# Patient Record
Sex: FEMALE | Race: WHITE | NOT HISPANIC OR LATINO | Employment: OTHER | URBAN - METROPOLITAN AREA
[De-identification: names, ages, dates, MRNs, and addresses within clinical notes are randomized per-mention and may not be internally consistent; named-entity substitution may affect disease eponyms.]

---

## 2017-01-10 ENCOUNTER — HISTORICAL (OUTPATIENT)
Dept: RADIOLOGY | Facility: HOSPITAL | Age: 82
End: 2017-01-10

## 2017-03-20 ENCOUNTER — HISTORICAL (OUTPATIENT)
Dept: ADMINISTRATIVE | Facility: HOSPITAL | Age: 82
End: 2017-03-20

## 2017-08-20 LAB — RAPID GROUP A STREP (OHS): NEGATIVE

## 2018-04-18 ENCOUNTER — HISTORICAL (OUTPATIENT)
Dept: ADMINISTRATIVE | Facility: HOSPITAL | Age: 83
End: 2018-04-18

## 2018-04-18 LAB
ALBUMIN SERPL-MCNC: 3.6 GM/DL (ref 3.4–5)
ALBUMIN/GLOB SERPL: 1.3 {RATIO}
ALP SERPL-CCNC: 69 UNIT/L (ref 38–126)
ALT SERPL-CCNC: 18 UNIT/L (ref 12–78)
AST SERPL-CCNC: 13 UNIT/L (ref 15–37)
BILIRUB SERPL-MCNC: 0.4 MG/DL (ref 0.2–1)
BILIRUBIN DIRECT+TOT PNL SERPL-MCNC: 0.1 MG/DL (ref 0–0.2)
BILIRUBIN DIRECT+TOT PNL SERPL-MCNC: 0.3 MG/DL (ref 0–0.8)
BUN SERPL-MCNC: 28 MG/DL (ref 7–18)
CALCIUM SERPL-MCNC: 9.4 MG/DL (ref 8.5–10.1)
CHLORIDE SERPL-SCNC: 105 MMOL/L (ref 98–107)
CHOLEST SERPL-MCNC: 197 MG/DL (ref 0–200)
CHOLEST/HDLC SERPL: 3.3 {RATIO} (ref 0–4)
CO2 SERPL-SCNC: 31 MMOL/L (ref 21–32)
CREAT SERPL-MCNC: 0.94 MG/DL (ref 0.55–1.02)
ERYTHROCYTE [DISTWIDTH] IN BLOOD BY AUTOMATED COUNT: 11.9 % (ref 11.5–17)
GLOBULIN SER-MCNC: 2.7 GM/DL (ref 2.4–3.5)
GLUCOSE SERPL-MCNC: 86 MG/DL (ref 74–106)
HCT VFR BLD AUTO: 40.6 % (ref 37–47)
HDLC SERPL-MCNC: 60 MG/DL (ref 35–60)
HGB BLD-MCNC: 13.6 GM/DL (ref 12–16)
LDLC SERPL CALC-MCNC: 118 MG/DL (ref 0–129)
MCH RBC QN AUTO: 32.1 PG (ref 27–31)
MCHC RBC AUTO-ENTMCNC: 33.5 GM/DL (ref 33–36)
MCV RBC AUTO: 95.8 FL (ref 80–94)
PLATELET # BLD AUTO: 182 X10(3)/MCL (ref 130–400)
PMV BLD AUTO: 10.5 FL (ref 9.4–12.4)
POTASSIUM SERPL-SCNC: 3.6 MMOL/L (ref 3.5–5.1)
PROT SERPL-MCNC: 6.3 GM/DL (ref 6.4–8.2)
RBC # BLD AUTO: 4.24 X10(6)/MCL (ref 4.2–5.4)
SODIUM SERPL-SCNC: 141 MMOL/L (ref 136–145)
TRIGL SERPL-MCNC: 96 MG/DL (ref 30–150)
TSH SERPL-ACNC: 3.01 MIU/ML (ref 0.36–3.74)
VLDLC SERPL CALC-MCNC: 19 MG/DL
WBC # SPEC AUTO: 3.5 X10(3)/MCL (ref 4.5–11.5)

## 2018-08-29 ENCOUNTER — HISTORICAL (OUTPATIENT)
Dept: ADMINISTRATIVE | Facility: HOSPITAL | Age: 83
End: 2018-08-29

## 2019-02-07 ENCOUNTER — HOSPITAL ENCOUNTER (OUTPATIENT)
Dept: MEDSURG UNIT | Facility: HOSPITAL | Age: 84
End: 2019-02-09
Attending: INTERNAL MEDICINE | Admitting: INTERNAL MEDICINE

## 2019-02-07 LAB
ABS NEUT (OLG): 3.36 X10(3)/MCL (ref 2.1–9.2)
ALBUMIN SERPL-MCNC: 3.6 GM/DL (ref 3.4–5)
ALBUMIN/GLOB SERPL: 1.1 RATIO (ref 1.1–2)
ALP SERPL-CCNC: 76 UNIT/L (ref 38–126)
ALT SERPL-CCNC: 18 UNIT/L (ref 12–78)
APPEARANCE, UA: ABNORMAL
AST SERPL-CCNC: 16 UNIT/L (ref 15–37)
BACTERIA SPEC CULT: ABNORMAL /HPF
BASOPHILS # BLD AUTO: 0 X10(3)/MCL (ref 0–0.2)
BASOPHILS NFR BLD AUTO: 1 %
BILIRUB SERPL-MCNC: 0.4 MG/DL (ref 0.2–1)
BILIRUB UR QL STRIP: NEGATIVE
BILIRUBIN DIRECT+TOT PNL SERPL-MCNC: 0.1 MG/DL (ref 0–0.5)
BILIRUBIN DIRECT+TOT PNL SERPL-MCNC: 0.3 MG/DL (ref 0–0.8)
BUN SERPL-MCNC: 30 MG/DL (ref 7–18)
CALCIUM SERPL-MCNC: 9.4 MG/DL (ref 8.5–10.1)
CHLORIDE SERPL-SCNC: 102 MMOL/L (ref 98–107)
CK SERPL-CCNC: 47 UNIT/L (ref 26–192)
CO2 SERPL-SCNC: 30 MMOL/L (ref 21–32)
COLOR UR: YELLOW
CREAT SERPL-MCNC: 1.17 MG/DL (ref 0.55–1.02)
EOSINOPHIL # BLD AUTO: 0.1 X10(3)/MCL (ref 0–0.9)
EOSINOPHIL NFR BLD AUTO: 3 %
ERYTHROCYTE [DISTWIDTH] IN BLOOD BY AUTOMATED COUNT: 12.1 % (ref 11.5–17)
GLOBULIN SER-MCNC: 3.4 GM/DL (ref 2.4–3.5)
GLUCOSE (UA): NEGATIVE
GLUCOSE SERPL-MCNC: 109 MG/DL (ref 74–106)
HCT VFR BLD AUTO: 37.1 % (ref 37–47)
HGB BLD-MCNC: 12.9 GM/DL (ref 12–16)
HGB UR QL STRIP: ABNORMAL
KETONES UR QL STRIP: NEGATIVE
LEUKOCYTE ESTERASE UR QL STRIP: ABNORMAL
LYMPHOCYTES # BLD AUTO: 0.9 X10(3)/MCL (ref 0.6–4.6)
LYMPHOCYTES NFR BLD AUTO: 19 %
MCH RBC QN AUTO: 31.6 PG (ref 27–31)
MCHC RBC AUTO-ENTMCNC: 34.8 GM/DL (ref 33–36)
MCV RBC AUTO: 90.9 FL (ref 80–94)
MONOCYTES # BLD AUTO: 0.4 X10(3)/MCL (ref 0.1–1.3)
MONOCYTES NFR BLD AUTO: 8 %
NEUTROPHILS # BLD AUTO: 3.36 X10(3)/MCL (ref 2.1–9.2)
NEUTROPHILS NFR BLD AUTO: 69 %
NITRITE UR QL STRIP: NEGATIVE
PH UR STRIP: 6.5 [PH] (ref 5–9)
PLATELET # BLD AUTO: 198 X10(3)/MCL (ref 130–400)
PMV BLD AUTO: 10.5 FL (ref 9.4–12.4)
POTASSIUM SERPL-SCNC: 3.6 MMOL/L (ref 3.5–5.1)
PROT SERPL-MCNC: 7 GM/DL (ref 6.4–8.2)
PROT UR QL STRIP: NEGATIVE
RBC # BLD AUTO: 4.08 X10(6)/MCL (ref 4.2–5.4)
RBC #/AREA URNS HPF: 8 /HPF (ref 0–2)
SODIUM SERPL-SCNC: 140 MMOL/L (ref 136–145)
SP GR UR STRIP: 1.01 (ref 1–1.03)
SQUAMOUS EPITHELIAL, UA: ABNORMAL
TROPONIN I SERPL-MCNC: <0.02 NG/ML (ref 0.02–0.49)
TROPONIN I SERPL-MCNC: <0.02 NG/ML (ref 0.02–0.49)
UROBILINOGEN UR STRIP-ACNC: 1
WBC # SPEC AUTO: 4.8 X10(3)/MCL (ref 4.5–11.5)
WBC #/AREA URNS HPF: 45 /HPF (ref 0–3)

## 2019-02-08 LAB
BUN SERPL-MCNC: 17 MG/DL (ref 7–18)
CALCIUM SERPL-MCNC: 9 MG/DL (ref 8.5–10.1)
CHLORIDE SERPL-SCNC: 108 MMOL/L (ref 98–107)
CO2 SERPL-SCNC: 27 MMOL/L (ref 21–32)
CREAT SERPL-MCNC: 0.88 MG/DL (ref 0.55–1.02)
CREAT/UREA NIT SERPL: 19.3
GLUCOSE SERPL-MCNC: 92 MG/DL (ref 74–106)
POTASSIUM SERPL-SCNC: 3.6 MMOL/L (ref 3.5–5.1)
SODIUM SERPL-SCNC: 143 MMOL/L (ref 136–145)
TROPONIN I SERPL-MCNC: <0.02 NG/ML (ref 0.02–0.49)
TROPONIN I SERPL-MCNC: <0.02 NG/ML (ref 0.02–0.49)

## 2019-02-09 LAB
BUN SERPL-MCNC: 17 MG/DL (ref 7–18)
CALCIUM SERPL-MCNC: 9 MG/DL (ref 8.5–10.1)
CHLORIDE SERPL-SCNC: 108 MMOL/L (ref 98–107)
CO2 SERPL-SCNC: 28 MMOL/L (ref 21–32)
CREAT SERPL-MCNC: 0.86 MG/DL (ref 0.55–1.02)
GLUCOSE SERPL-MCNC: 97 MG/DL (ref 74–106)
POTASSIUM SERPL-SCNC: 3.5 MMOL/L (ref 3.5–5.1)
SODIUM SERPL-SCNC: 143 MMOL/L (ref 136–145)

## 2019-03-19 ENCOUNTER — HISTORICAL (OUTPATIENT)
Dept: RADIOLOGY | Facility: HOSPITAL | Age: 84
End: 2019-03-19

## 2019-11-04 LAB
INFLUENZA A ANTIGEN, POC: NEGATIVE
INFLUENZA B ANTIGEN, POC: NEGATIVE

## 2019-12-18 LAB
INFLUENZA A ANTIGEN, POC: NEGATIVE
INFLUENZA B ANTIGEN, POC: NEGATIVE
RAPID GROUP A STREP (OHS): NEGATIVE

## 2020-02-15 ENCOUNTER — HOSPITAL ENCOUNTER (OUTPATIENT)
Dept: MEDSURG UNIT | Facility: HOSPITAL | Age: 85
End: 2020-02-16
Attending: INTERNAL MEDICINE | Admitting: INTERNAL MEDICINE

## 2020-02-15 LAB
ABS NEUT (OLG): 2.7 X10(3)/MCL (ref 2.1–9.2)
ALBUMIN SERPL-MCNC: 3 GM/DL (ref 3.4–5)
ALBUMIN/GLOB SERPL: 0.8 RATIO (ref 1.1–2)
ALP SERPL-CCNC: 81 UNIT/L (ref 38–126)
ALT SERPL-CCNC: 44 UNIT/L (ref 12–78)
AST SERPL-CCNC: 45 UNIT/L (ref 15–37)
BILIRUB SERPL-MCNC: 0.4 MG/DL (ref 0.2–1)
BILIRUBIN DIRECT+TOT PNL SERPL-MCNC: 0.1 MG/DL (ref 0–0.5)
BILIRUBIN DIRECT+TOT PNL SERPL-MCNC: 0.3 MG/DL (ref 0–0.8)
BUN SERPL-MCNC: 28 MG/DL (ref 7–18)
CALCIUM SERPL-MCNC: 9.1 MG/DL (ref 8.5–10.1)
CHLORIDE SERPL-SCNC: 99 MMOL/L (ref 98–107)
CO2 SERPL-SCNC: 32 MMOL/L (ref 21–32)
CREAT SERPL-MCNC: 1.19 MG/DL (ref 0.55–1.02)
ERYTHROCYTE [DISTWIDTH] IN BLOOD BY AUTOMATED COUNT: 11.8 % (ref 11.5–17)
GLOBULIN SER-MCNC: 3.6 GM/DL (ref 2.4–3.5)
GLUCOSE SERPL-MCNC: 113 MG/DL (ref 74–106)
HCT VFR BLD AUTO: 34.7 % (ref 37–47)
HGB BLD-MCNC: 12.1 GM/DL (ref 12–16)
LIPASE SERPL-CCNC: 171 UNIT/L (ref 73–393)
LYMPHOCYTES NFR BLD MANUAL: 18 % (ref 13–40)
MCH RBC QN AUTO: 31.8 PG (ref 27–31)
MCHC RBC AUTO-ENTMCNC: 34.9 GM/DL (ref 33–36)
MCV RBC AUTO: 91.3 FL (ref 80–94)
MICROCYTES BLD QL SMEAR: 1
MONOCYTES NFR BLD MANUAL: 8 % (ref 2–11)
NEUTROPHILS NFR BLD MANUAL: 74 % (ref 47–80)
OVALOCYTES BLD QL SMEAR: 2 (ref 0–0)
PLATELET # BLD AUTO: 135 X10(3)/MCL (ref 130–400)
PLATELET # BLD EST: NORMAL 10*3/UL
PMV BLD AUTO: 10.4 FL (ref 7.4–10.4)
POIKILOCYTOSIS BLD QL SMEAR: 1
POTASSIUM SERPL-SCNC: 3.5 MMOL/L (ref 3.5–5.1)
PROT SERPL-MCNC: 6.6 GM/DL (ref 6.4–8.2)
RBC # BLD AUTO: 3.8 X10(6)/MCL (ref 4.2–5.4)
RBC MORPH BLD: ABNORMAL
SODIUM SERPL-SCNC: 135 MMOL/L (ref 136–145)
WBC # SPEC AUTO: 4 X10(3)/MCL (ref 4.5–11.5)

## 2020-02-16 LAB
ABS NEUT (OLG): 1.65 X10(3)/MCL (ref 2.1–9.2)
ALBUMIN SERPL-MCNC: 2.8 GM/DL (ref 3.4–5)
ALBUMIN/GLOB SERPL: 0.9 {RATIO}
ALP SERPL-CCNC: 64 UNIT/L (ref 38–126)
ALT SERPL-CCNC: 30 UNIT/L (ref 12–78)
AST SERPL-CCNC: 15 UNIT/L (ref 15–37)
BILIRUB SERPL-MCNC: 0.4 MG/DL (ref 0.2–1)
BILIRUBIN DIRECT+TOT PNL SERPL-MCNC: 0.1 MG/DL (ref 0–0.2)
BILIRUBIN DIRECT+TOT PNL SERPL-MCNC: 0.3 MG/DL (ref 0–0.8)
BUN SERPL-MCNC: 17 MG/DL (ref 7–18)
CALCIUM SERPL-MCNC: 8.8 MG/DL (ref 8.5–10.1)
CHLORIDE SERPL-SCNC: 106 MMOL/L (ref 98–107)
CO2 SERPL-SCNC: 31 MMOL/L (ref 21–32)
CREAT SERPL-MCNC: 0.81 MG/DL (ref 0.55–1.02)
EOSINOPHIL NFR BLD MANUAL: 3 % (ref 0–8)
ERYTHROCYTE [DISTWIDTH] IN BLOOD BY AUTOMATED COUNT: 12 % (ref 11.5–17)
GLOBULIN SER-MCNC: 3 GM/DL (ref 2.4–3.5)
GLUCOSE SERPL-MCNC: 100 MG/DL (ref 74–106)
HCT VFR BLD AUTO: 32.5 % (ref 37–47)
HGB BLD-MCNC: 11.3 GM/DL (ref 12–16)
LYMPHOCYTES NFR BLD MANUAL: 21 % (ref 13–40)
MCH RBC QN AUTO: 31.5 PG (ref 27–31)
MCHC RBC AUTO-ENTMCNC: 34.8 GM/DL (ref 33–36)
MCV RBC AUTO: 90.5 FL (ref 80–94)
MONOCYTES NFR BLD MANUAL: 16 % (ref 2–11)
NEUTROPHILS NFR BLD MANUAL: 61 % (ref 47–80)
OVALOCYTES BLD QL SMEAR: 1 (ref 0–0)
PLATELET # BLD AUTO: 153 X10(3)/MCL (ref 130–400)
PLATELET # BLD EST: NORMAL 10*3/UL
PMV BLD AUTO: 10.3 FL (ref 7.4–10.4)
POIKILOCYTOSIS BLD QL SMEAR: 2
POTASSIUM SERPL-SCNC: 3 MMOL/L (ref 3.5–5.1)
PROT SERPL-MCNC: 5.8 GM/DL (ref 6.4–8.2)
RBC # BLD AUTO: 3.59 X10(6)/MCL (ref 4.2–5.4)
RBC MORPH BLD: ABNORMAL
SODIUM SERPL-SCNC: 143 MMOL/L (ref 136–145)
WBC # SPEC AUTO: 2.9 X10(3)/MCL (ref 4.5–11.5)

## 2020-07-30 ENCOUNTER — HISTORICAL (OUTPATIENT)
Dept: CARDIOLOGY | Facility: HOSPITAL | Age: 85
End: 2020-07-30

## 2021-01-19 ENCOUNTER — HISTORICAL (OUTPATIENT)
Dept: ADMINISTRATIVE | Facility: HOSPITAL | Age: 86
End: 2021-01-19

## 2021-01-19 LAB
ALBUMIN SERPL-MCNC: 4.5 GM/DL (ref 3.4–4.8)
ALBUMIN/GLOB SERPL: 1.5 RATIO (ref 1.1–2)
ALP SERPL-CCNC: 91 UNIT/L (ref 40–150)
ALT SERPL-CCNC: 13 UNIT/L (ref 0–55)
AST SERPL-CCNC: 18 UNIT/L (ref 5–34)
BILIRUB SERPL-MCNC: 0.5 MG/DL
BILIRUBIN DIRECT+TOT PNL SERPL-MCNC: 0.2 MG/DL (ref 0–0.5)
BILIRUBIN DIRECT+TOT PNL SERPL-MCNC: 0.3 MG/DL (ref 0–0.8)
BUN SERPL-MCNC: 29.1 MG/DL (ref 9.8–20.1)
CALCIUM SERPL-MCNC: 9.5 MG/DL (ref 8.4–10.2)
CHLORIDE SERPL-SCNC: 103 MMOL/L (ref 98–107)
CO2 SERPL-SCNC: 23 MMOL/L (ref 23–31)
CREAT SERPL-MCNC: 1.06 MG/DL (ref 0.55–1.02)
EST. AVERAGE GLUCOSE BLD GHB EST-MCNC: 93.9 MG/DL
GLOBULIN SER-MCNC: 3.1 GM/DL (ref 2.4–3.5)
GLUCOSE SERPL-MCNC: 75 MG/DL (ref 82–115)
HBA1C MFR BLD: 4.9 %
POTASSIUM SERPL-SCNC: 4.4 MMOL/L (ref 3.5–5.1)
PROT SERPL-MCNC: 7.6 GM/DL (ref 5.8–7.6)
SODIUM SERPL-SCNC: 141 MMOL/L (ref 136–145)

## 2021-05-17 ENCOUNTER — HISTORICAL (OUTPATIENT)
Dept: ADMINISTRATIVE | Facility: HOSPITAL | Age: 86
End: 2021-05-17

## 2021-05-17 LAB
ABS NEUT (OLG): 2.08 X10(3)/MCL (ref 2.1–9.2)
ALBUMIN SERPL-MCNC: 4 GM/DL (ref 3.4–4.8)
ALBUMIN/GLOB SERPL: 1.4 RATIO (ref 1.1–2)
ALP SERPL-CCNC: 84 UNIT/L (ref 40–150)
ALT SERPL-CCNC: 16 UNIT/L (ref 0–55)
AST SERPL-CCNC: 18 UNIT/L (ref 5–34)
BASOPHILS # BLD AUTO: 0 X10(3)/MCL (ref 0–0.2)
BASOPHILS NFR BLD AUTO: 1 %
BILIRUB SERPL-MCNC: 0.5 MG/DL
BILIRUBIN DIRECT+TOT PNL SERPL-MCNC: 0.2 MG/DL (ref 0–0.5)
BILIRUBIN DIRECT+TOT PNL SERPL-MCNC: 0.3 MG/DL (ref 0–0.8)
BUN SERPL-MCNC: 35.1 MG/DL (ref 9.8–20.1)
CALCIUM SERPL-MCNC: 9.8 MG/DL (ref 8.4–10.2)
CHLORIDE SERPL-SCNC: 108 MMOL/L (ref 98–107)
CHOLEST SERPL-MCNC: 205 MG/DL
CHOLEST/HDLC SERPL: 4 {RATIO} (ref 0–5)
CO2 SERPL-SCNC: 25 MMOL/L (ref 23–31)
CREAT SERPL-MCNC: 0.98 MG/DL (ref 0.55–1.02)
EOSINOPHIL # BLD AUTO: 0.3 X10(3)/MCL (ref 0–0.9)
EOSINOPHIL NFR BLD AUTO: 7 %
ERYTHROCYTE [DISTWIDTH] IN BLOOD BY AUTOMATED COUNT: 12.1 % (ref 11.5–17)
GLOBULIN SER-MCNC: 2.9 GM/DL (ref 2.4–3.5)
GLUCOSE SERPL-MCNC: 87 MG/DL (ref 82–115)
HCT VFR BLD AUTO: 39.3 % (ref 37–47)
HDLC SERPL-MCNC: 53 MG/DL (ref 35–60)
HGB BLD-MCNC: 13 GM/DL (ref 12–16)
LDLC SERPL CALC-MCNC: 131 MG/DL (ref 50–140)
LYMPHOCYTES # BLD AUTO: 0.8 X10(3)/MCL (ref 0.6–4.6)
LYMPHOCYTES NFR BLD AUTO: 23 %
MCH RBC QN AUTO: 32.3 PG (ref 27–31)
MCHC RBC AUTO-ENTMCNC: 33.1 GM/DL (ref 33–36)
MCV RBC AUTO: 97.5 FL (ref 80–94)
MONOCYTES # BLD AUTO: 0.4 X10(3)/MCL (ref 0.1–1.3)
MONOCYTES NFR BLD AUTO: 10 %
NEUTROPHILS # BLD AUTO: 2.08 X10(3)/MCL (ref 2.1–9.2)
NEUTROPHILS NFR BLD AUTO: 59 %
PLATELET # BLD AUTO: 188 X10(3)/MCL (ref 130–400)
PMV BLD AUTO: 10.7 FL (ref 9.4–12.4)
POTASSIUM SERPL-SCNC: 4.1 MMOL/L (ref 3.5–5.1)
PROT SERPL-MCNC: 6.9 GM/DL (ref 5.8–7.6)
RBC # BLD AUTO: 4.03 X10(6)/MCL (ref 4.2–5.4)
SODIUM SERPL-SCNC: 142 MMOL/L (ref 136–145)
TRIGL SERPL-MCNC: 106 MG/DL (ref 37–140)
TSH SERPL-ACNC: 2.1 UIU/ML (ref 0.35–4.94)
VLDLC SERPL CALC-MCNC: 21 MG/DL
WBC # SPEC AUTO: 3.5 X10(3)/MCL (ref 4.5–11.5)

## 2021-10-04 ENCOUNTER — HISTORICAL (OUTPATIENT)
Dept: SURGERY | Facility: HOSPITAL | Age: 86
End: 2021-10-04

## 2022-04-05 ENCOUNTER — HISTORICAL (OUTPATIENT)
Dept: ADMINISTRATIVE | Facility: HOSPITAL | Age: 87
End: 2022-04-05

## 2022-04-05 LAB
ABS NEUT (OLG): 3.63 (ref 2.1–9.2)
ALBUMIN SERPL-MCNC: 4 G/DL (ref 3.4–4.8)
ALBUMIN/GLOB SERPL: 1.2 {RATIO} (ref 1.1–2)
ALP SERPL-CCNC: 80 U/L (ref 40–150)
ALT SERPL-CCNC: 15 U/L (ref 0–55)
AST SERPL-CCNC: 18 U/L (ref 5–34)
BASOPHILS # BLD AUTO: 0 10*3/UL (ref 0–0.2)
BASOPHILS NFR BLD AUTO: 1 %
BILIRUB SERPL-MCNC: 0.4 MG/DL
BILIRUBIN DIRECT+TOT PNL SERPL-MCNC: 0.2 (ref 0–0.5)
BILIRUBIN DIRECT+TOT PNL SERPL-MCNC: 0.2 (ref 0–0.8)
BUN SERPL-MCNC: 38.9 MG/DL (ref 9.8–20.1)
CALCIUM SERPL-MCNC: 9.7 MG/DL (ref 8.7–10.5)
CHLORIDE SERPL-SCNC: 105 MMOL/L (ref 98–107)
CO2 SERPL-SCNC: 27 MMOL/L (ref 23–31)
CREAT SERPL-MCNC: 1.5 MG/DL (ref 0.55–1.02)
EOSINOPHIL # BLD AUTO: 0.2 10*3/UL (ref 0–0.9)
EOSINOPHIL NFR BLD AUTO: 3 %
ERYTHROCYTE [DISTWIDTH] IN BLOOD BY AUTOMATED COUNT: 12.3 % (ref 11.5–17)
GLOBULIN SER-MCNC: 3.2 G/DL (ref 2.4–3.5)
GLUCOSE SERPL-MCNC: 98 MG/DL (ref 82–115)
HCT VFR BLD AUTO: 38.1 % (ref 37–47)
HEMOLYSIS INTERF INDEX SERPL-ACNC: 6
HGB BLD-MCNC: 12.8 G/DL (ref 12–16)
ICTERIC INTERF INDEX SERPL-ACNC: 1
LIPEMIC INTERF INDEX SERPL-ACNC: 3
LYMPHOCYTES # BLD AUTO: 0.9 10*3/UL (ref 0.6–4.6)
LYMPHOCYTES NFR BLD AUTO: 17 %
MANUAL DIFF? (OHS): NO
MCH RBC QN AUTO: 32.7 PG (ref 27–31)
MCHC RBC AUTO-ENTMCNC: 33.6 G/DL (ref 33–36)
MCV RBC AUTO: 97.4 FL (ref 80–94)
MONOCYTES # BLD AUTO: 0.6 10*3/UL (ref 0.1–1.3)
MONOCYTES NFR BLD AUTO: 10 %
NEUTROPHILS # BLD AUTO: 3.63 10*3/UL (ref 2.1–9.2)
NEUTROPHILS NFR BLD AUTO: 68 %
PLATELET # BLD AUTO: 222 10*3/UL (ref 130–400)
PMV BLD AUTO: 10.8 FL (ref 9.4–12.4)
POTASSIUM SERPL-SCNC: 4.8 MMOL/L (ref 3.5–5.1)
PROT SERPL-MCNC: 7.2 G/DL (ref 5.8–7.6)
RBC # BLD AUTO: 3.91 10*6/UL (ref 4.2–5.4)
SODIUM SERPL-SCNC: 142 MMOL/L (ref 136–145)
WBC # SPEC AUTO: 5.3 10*3/UL (ref 4.5–11.5)

## 2022-04-10 ENCOUNTER — HISTORICAL (OUTPATIENT)
Dept: ADMINISTRATIVE | Facility: HOSPITAL | Age: 87
End: 2022-04-10
Payer: MEDICARE

## 2022-04-19 ENCOUNTER — HISTORICAL (OUTPATIENT)
Dept: ADMINISTRATIVE | Facility: HOSPITAL | Age: 87
End: 2022-04-19
Payer: MEDICARE

## 2022-04-19 LAB
APPEARANCE, UA: CLEAR
BACTERIA SPEC CULT: NORMAL
BILIRUB UR QL STRIP: NEGATIVE
COLOR UR: YELLOW
GLUCOSE (UA): NEGATIVE
HGB UR QL STRIP: NORMAL
KETONES UR QL STRIP: NEGATIVE
LEUKOCYTE ESTERASE UR QL STRIP: NORMAL
NITRITE UR QL STRIP: POSITIVE
PH UR STRIP: 5.5 [PH] (ref 5–9)
PROT UR QL STRIP: NEGATIVE
RBC #/AREA URNS HPF: NORMAL /[HPF] (ref 0–2)
SP GR UR STRIP: 1.02 (ref 1–1.03)
SQUAMOUS EPITHELIAL, UA: NORMAL (ref 0–4)
UA WBC MAN: NORMAL (ref 0–2)
UROBILINOGEN UR STRIP-ACNC: 0.2

## 2022-04-26 VITALS
DIASTOLIC BLOOD PRESSURE: 64 MMHG | HEIGHT: 61 IN | SYSTOLIC BLOOD PRESSURE: 148 MMHG | WEIGHT: 116 LBS | BODY MASS INDEX: 21.9 KG/M2 | OXYGEN SATURATION: 100 %

## 2022-04-28 DIAGNOSIS — N28.9 RENAL INSUFFICIENCY: Primary | ICD-10-CM

## 2022-04-28 RX ORDER — HYDROXYCHLOROQUINE SULFATE 200 MG/1
200 TABLET, FILM COATED ORAL
COMMUNITY
Start: 2021-12-28 | End: 2022-12-12 | Stop reason: SDUPTHER

## 2022-04-28 RX ORDER — CLOPIDOGREL BISULFATE 75 MG/1
75 TABLET ORAL
COMMUNITY
Start: 2021-08-27 | End: 2022-09-16 | Stop reason: SDUPTHER

## 2022-04-28 RX ORDER — AMLODIPINE BESYLATE 5 MG/1
1 TABLET ORAL 2 TIMES DAILY
COMMUNITY
Start: 2021-12-02 | End: 2022-05-03 | Stop reason: SDUPTHER

## 2022-04-29 NOTE — ED PROVIDER NOTES
Patient:   Danae Mora            MRN: [id number]            FIN: [id number]               Age:   84 years     Sex:  Female     :  1935   Associated Diagnoses:   Cough; Difficulty swallowing; Dysphagia   Author:   Calvin Lacey MD      Basic Information   Time seen: Date & time 2/15/2020 03:54:00.   History source: Patient, family.   Arrival mode: Private vehicle.   History limitation: None.   Additional information: Patient's physician(s): Jacoby Topete MD      History of Present Illness   The patient presents with     83 y/o CF with a history of HTN and RA, presents to the ED for complaints of intermittent difficulty swallowing, and SOB, over the past 4-5 days. Pt states that after eating or drinking, she immediately begins coughing, and occasionally causes her to vomit. She was seen in the ED on 20 for similar complaints, prescribed Tessalon Perles, Zofran, a Ventolin inhaler, and a course of Levaquin for possible pneumonia, which she says she has been taking. Pt reports that since then, she has continued to have a non-productive cough, occasional chills, and says the only food she has been able to hold down, was some chicken noodle soup yesterday. She denies any recent extremity weakness or changes in her speech. .  The onset was 4-5 days.  The course/duration of symptoms is fluctuating in intensity.  Character of symptoms Coughing after swallowing.  The degree at present is minimal.  The exacerbating factor is drinking.  The relieving factor is none.  Risk factors consist of none.  Prior episodes: none.  Therapy today: none.  Associated symptoms: shortness of breath and vomiting.        Review of Systems   Constitutional symptoms:  Chills.   Skin symptoms:  Negative except as documented in HPI.   Eye symptoms:  Negative except as documented in HPI.   ENMT symptoms:  Difficulty swallowing.   Respiratory symptoms:  Shortness of breath, cough.    Cardiovascular symptoms:  Negative  except as documented in HPI.   Gastrointestinal symptoms:  Vomiting.   Genitourinary symptoms:  Negative except as documented in HPI.   Musculoskeletal symptoms:  Negative except as documented in HPI.   Neurologic symptoms:  Negative except as documented in HPI.      Health Status   Allergies:    Allergic Reactions (Selected)  Severity Not Documented  Anabolic steroids- No reactions were documented.  CeleBREX- No reactions were documented.  Celecoxib- No reactions were documented.  Sulfamethoxazole- No reactions were documented..   Medications:  (Selected)   Prescriptions  Prescribed  Levaquin 750 mg oral tablet: 750 mg = 1 tab(s), Oral, q24hr, X 7 day(s), # 7 tab(s), 0 Refill(s), Pharmacy: [id number], 157, cm, Height/Length Dosing, 20 14:00:00 CST, 53.97, kg, Weight Dosing, 20 14:00:00 CST  Myrbetriq 50 mg oral tablet, extended release: 50 mg = 1 tab(s), Oral, Daily, do not crush or chew, # 30 tab(s), 1 Refill(s), Pharmacy: [id number]  Phenergan 25 mg rectal suppository: 25 mg = 1 supp, VT, q4hr, PRN PRN for nausea/vomiting, # 12 supp, 0 Refill(s), Pharmacy: [id number], 157, cm, Height/Length Dosing, 20 14:00:00 CST, 53.97, kg, Weight Dosing, 20 14:00:00 CST  Tessalon 200 mg oral capsule: 200 mg = 1 cap(s), Oral, TID, X 7 day(s), # 21 cap(s), 0 Refill(s), Pharmacy: [id number], 157, cm, Height/Length Dosing, 20 14:00:00 CST, 53.97, kg, Weight Dosing, 20 14:00:00 CST  Tylenol No 3 oral tablet: See Instructions, 1 po q 8 hours, # 30 tab(s), 0 Refill(s)  Ventolin HFA 90 mcg/inh inhalation aerosol: 1 puff(s), INH, q4hr, PRN PRN as needed for wheezing, # 8 gm, 0 Refill(s), Pharmacy: [id number], 157, cm, Height/Length Dosing, 20 14:00:00 CST, 53.97, kg, Weight Dosing, 20 14:00:00 CST  Zofran ODT 4 mg oral tablet, disintegratin mg = 1 tab(s), Oral, q8hr, PRN PRN as needed for nausea/vomiting, # 15 tab(s), 0 Refill(s),  Pharmacy: [id number], 157, cm, Height/Length Dosing, 01/09/20 14:00:00 CST, 53.97, kg, Weight Dosing, 01/09/20 14:00:00 CST  amLODIPine 5 mg oral tablet: See Instructions, TAKE 1 TABLET TWICE A DAY, # 180 tab(s), 3 Refill(s), Pharmacy: [facility]  dextromethorphan-promethazine 15 mg-6.25 mg/5 mL oral syrup: 5 mL, Oral, q6hr, # 120 mL, 0 Refill(s), Pharmacy: [id number], 157, cm, Height/Length Dosing, 01/09/20 14:00:00 CST, 53.97, kg, Weight Dosing, 01/09/20 14:00:00 CST  hydrochlorothiazide 25 mg oral tablet: 25 mg = 1 tab(s), Oral, Daily, # 90 tab(s), 3 Refill(s), Pharmacy: [facility]  hydroxychloroquine 200 mg oral tablet: 200 mg = 1 tab(s), Oral, Daily, # 90 tab(s), 3 Refill(s), Pharmacy: [facility]  meloxicam 7.5 mg oral tablet: See Instructions, TAKE 1 TABLET DAILY, # 90 tab(s), 3 Refill(s), Pharmacy: [facility]  metoprolol tartrate 25 mg oral tab: 25 mg = 1 tab(s), Oral, Daily, # 90 tab(s), 3 Refill(s), Pharmacy: [facility]  traMADol 50 mg oral tablet: 50 mg = 1 tab(s), Oral, q12hr, PRN PRN for pain, greater than 7 days, medically necessary, # 60 tab(s), 0 Refill(s)  Documented Medications  Documented  latanoprost 0.005% ophthalmic solution: 1 drop(s), Eye-Both, qPM  solifenacin 10 mg oral tablet: 10 mg = 1 tab(s), Oral, Daily.      Past Medical/ Family/ Social History   Medical history:    Active  HTN (hypertension) ([id number])  Resolved  Arthritis (716.90):  Resolved.  Glaucoma (45116834):  Resolved.  Polio ([id number]):  Resolved.  Reflux ([id number]):  Resolved.  UTI (urinary tract infection) ([id number]):  Resolved.  HTN (hypertension) ([id number]):  Resolved.  Glaucoma (28471617):  Resolved.  RA (rheumatoid arthritis) ([id number]):   Resolved..   Surgical history:    Cataract surgery ([id number]).  Hip replacement ([id number]).  Knee replacement ([id number]).  right foot surgery.  Excision of basal cell carcinoma ([id number]).  Comments:  12/15/2013 18:42 CST - Stephanie LOVING, Alejandra PAUL.  back and breast  left knee replacemant.  right foot broken.  tosilllectomy.  cataract surgery.  right hip replacement.   left knee surgery.  right foot surgery.  Cataract extraction ([id number])..   Family history:    Mother  Hypertension.  Brother  Glaucoma.  Sister  Glaucoma.  Father:  ()  Age at death unknown.   Alcoholism  .   Social history: Alcohol use: Occasionally, Tobacco use: Denies, Drug use: Denies.      Physical Examination               Vital Signs   Vital Signs   2/15/2020 3:28 CST       Temperature Oral          36.6 DegC                             Temperature Oral (calculated)             97.88 DegF                             Peripheral Pulse Rate     82 bpm                             Respiratory Rate          20 br/min                             SpO2                      99 %                             Oxygen Therapy            Room air                             Systolic Blood Pressure   141 mmHg  HI                             Diastolic Blood Pressure  94 mmHg  HI  .   Measurements   2/15/2020 3:28 CST       Weight Estimated          54 kg  .   Basic Oxygen Information   2/15/2020 3:28 CST       SpO2                      99 %                             Oxygen Therapy            Room air  .   General:  Alert, no acute distress.    Skin:  Warm, dry, intact.    Head:  Normocephalic, atraumatic.    Eye   Cardiovascular:  Regular rate and rhythm, Normal peripheral perfusion, No edema.    Respiratory:  Lungs are clear to auscultation, respirations are non-labored, breath sounds are equal, Symmetrical chest wall expansion, Cough: Dry, occasional.    Gastrointestinal:  Soft, Nontender, Non distended, Normal bowel sounds, Guarding: Negative,  Rebound: Negative.    Musculoskeletal:  No deformity.   Neurological:  Alert and oriented to person, place, time, and situation, No focal neurological deficit observed, normal speech observed.    Psychiatric:  Cooperative, appropriate mood & affect.       Medical Decision Making   Differential Diagnosis:  Esophageal foreign body, pharyngitis, esophageal Stricture, anxiety, GERD, CVA, esophageal dysmotility, aspiration.    Rationale:  No focal neurologic deficits symptoms have been present for about 5 days.  She was able to hold down soup last night and water today but still feels that she coughs after swallowing.  She is not coughing up anything or vomiting in the ED.  She is handling her secretions well.  Do not believe emergent endoscopy is needed at this time.  CT of her head was obtained negative for evidence of a recent CVA.  I do not see any significant changes in her chest x-ray at this time.  She's been on levofloxacin for 2 days and consideration for possible pneumonia.  I discussed the case with on call of her primary physician.  Admit. Ordered a swallow study.  She may need speech therapy evaluation or possibly an upper endoscopy for further evaluation.   Documents reviewed:  Emergency department nurses' notes.   Orders  Launch Order Profile (Selected)   Inpatient Orders  Ordered (Exam Ordered)  CT Head W/O Contrast: Stat, 02/15/20 3:58:00 CST, Other (please specify), CVA, None, Wheelchair, Patient Has IV?, Rad Type, Schedule this test, 02/15/20 3:58:00 CST  CXR 2 Views: Stat, 02/15/20 3:58:00 CST, Cough, None, Wheelchair, Patient Has IV?, Rad Type, Not Scheduled, 02/15/20 3:58:00 CST  Ordered (In-Lab)  Automated Diff: NOW collect, 02/15/20 4:09:00 CST, Blood, Collected, Stop date 02/15/20 4:09:00 CST, Lab Collect, Print Label By Order Location, 02/15/20 3:58:00 CST  CBC w/ Auto Diff: NOW collect, 02/15/20 4:09:13 CST, BLOOD, Collected, Stop date 02/15/20 4:09:00 CST, Lab Collect  CMP: STAT collect,  02/15/20 4:09:13 CST, BLOOD, Collected, Stop date 02/15/20 4:09:00 CST, Lab Collect  Lipase Level: STAT collect, 02/15/20 4:09:13 CST, BLOOD, Collected, Stop date 02/15/20 4:09:00 CST, Lab Collect.   Results review:  Lab results : Lab View   2/15/2020 4:09 CST       Sodium Lvl                135 mmol/L  LOW                             Potassium Lvl             3.5 mmol/L                             Chloride                  99 mmol/L                             CO2                       32.0 mmol/L                             Calcium Lvl               9.1 mg/dL                             Glucose Lvl               113 mg/dL  HI                             BUN                       28.0 mg/dL  HI                             Albumin Lvl               3.00 gm/dL  LOW                             Lipase Lvl                171 unit/L                             WBC                       4.0 x10(3)/mcL  LOW                             RBC                       3.80 x10(6)/mcL  LOW                             Hgb                       12.1 gm/dL                             Hct                       34.7 %  LOW                             Platelet                  135 x10(3)/mcL                             MCV                       91.3 fL                             MCH                       31.8 pg  HI                             MCHC                      34.9 gm/dL                             RDW                       11.8 %                             MPV                       10.4 fL                             Abs Neut                  2.70 x10(3)/mcL  .   Chest X-Ray:  No acute disease process, interpretation by Emergency Physician.    Radiology results:  Reported at  2/15/2020 04:11:00, Computed tomography, Head, reviewed radiologist's report,    FINDINGS:  Hemorrhage:No acute intracranial hemorrhage is seen.  CSF spaces:The ventricles, sulci and basal cisterns all appear mildly prominent consistent with global  cerebral atrophy.  Brain parenchyma:There is preservation of the grey white junction throughout. Mild microvascular change is seen in portions of the periventricular and deep white matter tracts.  Cerebellum:Unremarkable.  Vascular:Severe atheromatous calcification of the intracranial arteries is seen.  Sella and skull base:Unremarkable.  Herniation:None.  Intracranial calcifications:Incidental note is made of bilateral choroid plexus calcification. Incidental note is made of some pineal region calcification.  Calvarium:No acute linear or depressed skull fracture is seen.     Maxillofacial Structures:  Paranasal sinuses:The visualized paranasal sinuses appear clear with no mucoperiosteal thickening or air fluid levels identified.  Orbits:Unremarkable.  Temporal bones and mastoids:Unremarkable.  TMJ:The mandibular condyles appear normally placed with respect to the mandibular fossa.        Impression:  1. No acute intracranial process identified. Details as above..       Impression and Plan   Diagnosis   Cough (JLX45-FC R05)   Difficulty swallowing (PNED [id number])   Dysphagia (FYA70-DK R13.10)      Calls-Consults   -  Lucinda QUINONES MD, Zana GARCIA   Plan   Condition: Improved, Stable.    Disposition: Admit.    Counseled: Patient, Family, Regarding diagnosis, Regarding diagnostic results, Regarding treatment plan, Patient indicated understanding of instructions.    Notes: I, Hugo Anderson, acted solely as a scribe for and in the presence of Dr. Lacey who performed the service. , I, Dr Lacey have read note from scribe and I agree with history and physical except as amended by me.  All information was dictated from my history and my examination of patient..

## 2022-04-29 NOTE — DISCHARGE SUMMARY
Patient:   Danae Mora            MRN: [id number]            FIN: [id number]               Age:   84 years     Sex:  Female     :  1935   Associated Diagnoses:   None   Author:   Lucinda QUINONES MD, Zana PAUL      Discharge Information      Discharge Summary Information   Admit/Discharge Dates   Admit Date: 02/15/2020  Discharge Date: 2020     Physicians   Attending Physician - Efrem LEE, Jacoby GUERIN  Admitting Physician - Efrem LEE, Jacoby GUERIN  Primary Care Physician - Efrem LEE, Jacoby GUERIN     Discharge Diagnosis   Cough (R05)   Dysphagia, unspecified (R13.10)      Procedures   No procedures recorded for this visit.   Discharge Medications   Prescribed  fluticasone nasal (fluticasone 50 mcg/inh nasal spray) 1 spray(s), Both Nostrils, BID  loratadine (Claritin 10 mg oral tablet) 10 mg, Oral, Daily  metoprolol (metoprolol succinate 50 mg oral tablet, extended release) 50 mg, Oral, Daily  pantoprazole (Pantoprazole 40 mg ORAL EC-Tablet) 40 mg, Oral, Daily  Continue  acetaminophen-codeine (Tylenol No 3 oral tablet) See Instructions  albuterol (Ventolin HFA 90 mcg/inh inhalation aerosol) 1 puff(s), INH, q4hr, PRN as needed for wheezing  amLODIPine (amLODIPine 5 mg oral tablet) See Instructions  benzonatate (Tessalon 200 mg oral capsule) 200 mg, Oral, TID  dextromethorphan-promethazine (dextromethorphan-promethazine 15 mg-6.25 mg/5 mL oral syrup) 5 mL, Oral, q6hr  hydroxychloroquine (hydroxychloroquine 200 mg oral tablet) 200 mg, Oral, Daily  latanoprost ophthalmic (latanoprost 0.005% ophthalmic solution) 1 drop(s), Eye-Both, qPM  meloxicam (meloxicam 7.5 mg oral tablet) See Instructions  ondansetron (Zofran ODT 4 mg oral tablet, disintegrating) 4 mg, Oral, q8hr, PRN as needed for nausea/vomiting  solifenacin (solifenacin 10 mg oral tablet) 10 mg, Oral, Daily  traMADol (traMADol 50 mg oral tablet) 50 mg, Oral, q12hr, PRN for pain  Discontinue  hydrochlorothiazide (hydrochlorothiazide 25 mg oral tablet) 25 mg,  Oral, Daily  levoFLOXacin (Levaquin 750 mg oral tablet) 750 mg, Oral, q24hr  metoprolol (metoprolol tartrate 25 mg oral tab) 25 mg, Oral, Daily  mirabegron (Myrbetriq 50 mg oral tablet, extended release) 50 mg, Oral, Daily  promethazine (Phenergan 25 mg rectal suppository) 25 mg, SD (rectal), q4hr, PRN for nausea/vomiting        Hospital Course   Hospital Course   Time Spent on Discharge: 40 minutes.       Discharge diagnoses:  1. Dysphagia  2. Hypertension  3. Rheumatoid arthritis  4. Hypokalemia  5. Chronic rhinosinusitis    84-year-old female patient of Dr. Topete presented to emergency room yesterday morning with difficulty swallowing and choking during meals over the past week. She had presented to the emergency room 2 days prior to this with similar symptoms and was discharged home with Tessalon Perles, Zofran, and an inhaler. She also reports sore throat over the past year. Esophagram yesterday morning was normal. There was no evidence of foreign body, hital hernia etc. Her symptoms of coughing and dysphagia over the past 5 days still could be related to acid reflux or chronic rhinosinusitis. She has a strong gag reflex, nearly vomiting every day when brushing her teeth most of her life. She was treated with IV Protonix and will be discharged home with pantoprazole for 30 days. Will also discussed fluticasone and Claritin for possible rhinosinusitis, with sore throat and cough over the past 30 days or so. Because of hypokalemia, hydrochlorothiazide was discontinued, and metoprolol was increased to 50 mg. She will be given a dose of potassium this morning before discharge.           Physical Examination   Vital Signs   2/16/2020 7:49 CST       Temperature Oral          36.6 DegC                             Temperature Oral (calculated)             97.88 DegF                             Peripheral Pulse Rate     78 bpm                             SpO2                      97 %                              Systolic Blood Pressure   153 mmHg  HI                             Diastolic Blood Pressure  92 mmHg  HI                             Mean Arterial Pressure, Cuff              113 mmHg    2/16/2020 3:40 CST       Temperature Oral          36.8 DegC                             Temperature Oral (calculated)             98.24 DegF                             Peripheral Pulse Rate     68 bpm                             SpO2                      96 %                             Systolic Blood Pressure   155 mmHg  HI                             Diastolic Blood Pressure  77 mmHg                             Mean Arterial Pressure, Cuff              103 mmHg    2/16/2020 2:03 CST       Oxygen Therapy            Room air    2/16/2020 0:00 CST       Temperature Oral          36.7 DegC                             Temperature Oral (calculated)             98.06 DegF                             Peripheral Pulse Rate     62 bpm                             SpO2                      98 %                             Systolic Blood Pressure   145 mmHg  HI                             Diastolic Blood Pressure  67 mmHg    2/15/2020 22:04 CST      Oxygen Therapy            Room air    2/15/2020 20:00 CST      Oxygen Therapy            Room air    2/15/2020 19:00 CST      Temperature Oral          36.4 DegC                             Temperature Oral (calculated)             97.52 DegF                             Peripheral Pulse Rate     89 bpm                             SpO2                      98 %                             Systolic Blood Pressure   110 mmHg                             Diastolic Blood Pressure  69 mmHg    2/15/2020 15:00 CST      Temperature Oral          36.5 DegC                             Temperature Oral (calculated)             97.70 DegF                             Peripheral Pulse Rate     74 bpm                             SpO2                      98 %                             Systolic Blood Pressure    119 mmHg                             Diastolic Blood Pressure  67 mmHg                             Mean Arterial Pressure, Cuff              84 mmHg    2/15/2020 11:00 CST      Temperature Oral          36.6 DegC                             Temperature Oral (calculated)             97.88 DegF                             Peripheral Pulse Rate     73 bpm                             SpO2                      97 %                             Systolic Blood Pressure   115 mmHg                             Diastolic Blood Pressure  66 mmHg                             Mean Arterial Pressure, Cuff              82 mmHg    2/15/2020 7:45 CST       Oxygen Therapy            Room air    2/15/2020 7:42 CST       Temperature Oral          36.5 DegC                             Temperature Oral (calculated)             97.70 DegF                             Peripheral Pulse Rate     77 bpm                             SpO2                      97 %                             Systolic Blood Pressure   117 mmHg                             Diastolic Blood Pressure  69 mmHg                             Mean Arterial Pressure, Cuff              85 mmHg    2/15/2020 6:37 CST       Peripheral Pulse Rate     79 bpm                             SpO2                      95 %                             Systolic Blood Pressure   155 mmHg  HI                             Diastolic Blood Pressure  78 mmHg                             Mean Arterial Pressure, Cuff              104 mmHg    2/15/2020 6:16 CST       Temperature Oral          37 DegC                             Temperature Oral (calculated)             98.60 DegF                             Peripheral Pulse Rate     84 bpm                             Heart Rate Monitored      83 bpm                             Respiratory Rate          20 br/min                             SpO2                      98 %                             Systolic Blood Pressure   138 mmHg                              Diastolic Blood Pressure  78 mmHg                             Mean Arterial Pressure, Cuff              98 mmHg    2/15/2020 5:26 CST       Peripheral Pulse Rate     75 bpm                             Heart Rate Monitored      75 bpm                             Respiratory Rate          14 br/min                             SpO2                      96 %                             Oxygen Therapy            Room air                             Systolic Blood Pressure   122 mmHg                             Diastolic Blood Pressure  69 mmHg                             Mean Arterial Pressure, Cuff              87 mmHg    2/15/2020 3:28 CST       Temperature Oral          36.6 DegC                             Temperature Oral (calculated)             97.88 DegF                             Peripheral Pulse Rate     82 bpm                             Respiratory Rate          20 br/min                             SpO2                      99 %                             Oxygen Therapy            Room air                             Systolic Blood Pressure   141 mmHg  HI                             Diastolic Blood Pressure  94 mmHg  HI        Vital Signs (last 24 hrs)_____  Last Charted___________  Temp Oral     36.6 DegC  (FEB 16 07:49)  Heart Rate Peripheral   78 bpm  (FEB 16 07:49)  SBP      H 153mmHg  (FEB 16 07:49)  DBP      H 92mmHg  (FEB 16 07:49)  SpO2      97 %  (FEB 16 07:49)     Measurements from flowsheet : Measurements   2/15/2020 7:30 CST       Weight Dosing             53.3 kg                             Weight Measured           53.3 kg                             Weight Measured and Calculated in Lbs     117.51 lb                             Height/Length Dosing      157.48 cm                             Height/Length Measured    157.48 cm                             Body Mass Index Measured  21.49 kg/m2    2/15/2020 5:56 CST       Weight Dosing             Not Done: Task Duplication  (Not Done)                             Height/Length Dosing      Not Done: Task Duplication  (Not Done)   2/15/2020 3:28 CST       Weight Estimated          54 kg     General:  Alert and oriented, No acute distress.    Eye:  Extraocular movements are intact.    HENT:  Normocephalic.    Neck:  Supple.    Respiratory:  Lungs are clear to auscultation, Respirations are non-labored.    Cardiovascular:  Normal rate, Regular rhythm, No edema.    Gastrointestinal:  Non-distended.    Integumentary:  Warm.    Neurologic:  Alert.    Psychiatric:  Cooperative.

## 2022-04-29 NOTE — H&P
HISTORY:  Patient of [facility] with history of rheumatoid arthritis and history of hypertension as well as chronic insomnia and GERD who states on Monday when she walked outside her house she fell.  She was trying to break the fall by holding a chair next to her but that rolled away and she fell on her back.  She was able to reach her phone and call her  who was inside for assistance.  At that time, she told her  she may have missed a step, but now after further interview, she does not know what happened.  She has a recollection that her legs felt weak when this happened.  In the past, this had been going on with frequent admissions but unable to find anything.  Yesterday, she went to get a pedicure and she said while the girl was massaging her leg she was feeling really painful and weak.  She went back home and there she felt like she was about to pass out, reason she told her .  The  brought her to the emergency room.  In the emergency room, the patient has been evaluated, found to have a UTI and admitted for further workup of near syncope.  The patient refers she wants to see Dr. Thurman because she has seen him in the past.  In the meanwhile, we will go ahead and start with the echo to rule out any valvular heart disease, carotid and lower extremity __________ as a workup of syncope.  Any abnormality found on blood work has been just azotemia.  We will continue with fluids and repeat BMP today.  Patient complaining of occasional heartburn and chronic [QAMARKER 150].  We will go ahead and address those as well.    SOCIAL HISTORY:  She is , 2 children. No alcohol.  No tobacco.    PAST MEDICAL HISTORY:  Hypertension, rheumatoid arthritis, glaucoma, GERD.    PAST SURGICAL HISTORY:  Pertinent for bilateral cataract surgery, hip replacement, knee replacement, basal cell carcinoma excision, tonsillectomy, adenoidectomy, right foot surgery.    FAMILY HISTORY:  Both parents ,  history of hypertension and glaucoma.    PHYSICAL EXAMINATION:  VITAL SIGNS:  Blood pressure 125/64, respiratory rate 18, pulse 67, temperature 37.    LABORATORY DATA:  Sodium 140, potassium 3.6, chloride 102, bicarb 30, BUN 30, creatinine 1.17, glucose 109.  AST normal, ALT is normal.  Cardiac enzymes negative.  H and H 12 and 37 with a WBC of 4.8 and a platelet count 198 with a normal differential.  Urinalysis shows over 100,000 gram-negative rods on Rocephin.    DIAGNOSES:  Frequent falls-near syncope, history of hypertension, history of rheumatoid arthritis, and chronic pains, more so on the shoulders for which she sees Dr. Annamarie Durand as she is on medications for that.    PLAN:  We will go ahead and consult again Dr. Thurman, Cardiology, for further cardiology evaluation.  We will go ahead and give her something to rest and sleep.  She is allergic to sulfa and Celebrex.  We are going to hold Tramadol, possibility of a side effect being lightheadedness or dizziness.  For pain, we will go ahead and give her Tylenol No.3 as needed and trazodone to sleep.        ______________________________  Jacoby Topete MD    JJP/UM  DD:  02/08/2019  Time:  09:56AM  DT:  02/08/2019  Time:  10:42AM  Job #:  [id number]    The H&P was reviewed, the patient was examined, and the following changes to the patients condition are noted:  ______________________________________________________________________________  ______________________________________________________________________________  ______________________________________________________________________________  [  ] No changes to the patient's condition:      ______________________________                                             ___________________  PHYSICIAN SIGNATURE                                                             DATE/TIME

## 2022-04-29 NOTE — DISCHARGE SUMMARY
DISCHARGE DATE:  02/09/2019    Patient is an 83-year-old woman with a history of rheumatoid arthritis, hypertension, and chronic insomnia who suffered a fall on the day of admission.  It was felt this was likely a syncopal episode.  She presented to the emergency room.  She was found to have urinary tract infection and admitted for further workup of syncope or near syncope.     Physical exam at time of admission presumably was unremarkable.  Physical exam at time of discharge by me included an unremarkable heart exam.  Her lungs were clear.  The abdomen was nontender, and there was no significant dependent edema.    LABORATORY STUDIES:  Included an azotemia with a BUN of 30, creatinine of 1.17, blood sugar of 109.  Liver bracket normal.  Cardiac enzymes normal.  Unremarkable CBC.  Urinalysis had 45 white cells, 8 red cells, 2+ bacteria.  Culture grew out over 100,000 colonies of E coli that was sensitive to everything.    IMAGING:  X-ray studies included a CT of the head without contrast, which showed no acute intracranial findings.  There was microvascular ischemia and atrophy.     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  Telemetry revealed sinus rhythm only.  Carotid ultrasound showed less than 50% stenosis on both sides.  A venous NIVA showed no evidence of DVT.  An echocardiogram showed an ejection fraction 60%, 2+ aortic regurgitation, otherwise unremarkable.    HOSPITAL COURSE:  Her hospital course was notable for lack of change.  She felt fine.  She tolerated the evaluation and workup well.     She was seen in consultation by Dr. Iglesia Thurman, who recommended that the amlodipine be discontinued and to monitor her situation.  It appeared that the amlodipine was stopped, but the patient was actually taking her own home doses of amlodipine.  He also suggested that the nonsteroidal anti-inflammatory drugs be  held or at least to consider holding them because of the elevated BUN at time of admission.     The patient is currently stable.  Her vital signs are notable for a blood pressure of 158/76.  She has been taking the amlodipine, so I will not discontinue that.  That can be decided at a later date.  She will go home on her prior home meds and with the addition of Omnicef 300 b.i.d. x5 days.    FOLLOWUP:  Follow up with Dr. Topete as scheduled in about 2 weeks.      DISCHARGE DIAGNOSES:    1. Fall with syncope or near syncope.  2. Hypertension.  3. Mild carotid stenosis.  4. 2+ aortic insufficiency.  5. Rheumatoid arthritis.    DISCHARGE MEDICATIONS:  As stated above, will be the same as admission, with the addition of Omnicef.        ______________________________  Angel Dillard MD    MSA/UT  DD:  02/09/2019  Time:  11:08AM  DT:  02/09/2019  Time:  12:27PM  Job #:  [id number]

## 2022-04-29 NOTE — H&P
Patient:   Danae Mora            MRN: [id number]            FIN: [id number]               Age:   84 years     Sex:  Female     :  1935   Associated Diagnoses:   None   Author:   Lucinda QUINONES MD, Zana PAUL      Basic Information   Source of history:  Self.    Present at bedside:  Family member.       Chief Complaint   2/15/2020 3:28 CST       C/O SOB SINCE SEEN HERE , DX WITH PNEUMONIA SOB NOW WORSE.DESCRIBES INTERMITTENT SENSATION OF  FOOD BOLUS. ABLE TO EAT SOUP EARLIER, BUT NOW CANNOT SWALLOW WATER. RA SAT - 99        History of Present Illness   84-year-old female patient of Dr. Topete presented to the emergency room this morning with difficulty swallowing and choking during meals over the past week. She presented to the emergency room 2 days ago with similar symptoms and was prescribed Tessalon Perles, Zofran, and Ventolin inhaler. She reports a sore throat over the past year, but denies history of chronic rhinosinusitis. Over the past 5 days, she has not eaten anything and feels dehydrated, she says.    She has a past medical history rheumatoid arthritis, hypertension, and hyperlipidemia among other conditions. See below for more details past medical history, past surgeries, family history, medications etc.      Review of Systems   Constitutional:  Negative except as documented in history of present illness.    Eye:  Negative except as documented in history of present illness.    Ear/Nose/Mouth/Throat:  Negative except as documented in history of present illness.    Respiratory:  Negative except as documented in history of present illness.    Cardiovascular:  No chest pain, No palpitations.    Gastrointestinal:  Negative except as documented in history of present illness.    Musculoskeletal:  Negative except as documented in history of present illness.    Integumentary:  Negative except as documented in history of present illness.    Neurologic:  Negative except as documented in history  of present illness.    Psychiatric:  Negative except as documented in history of present illness.       Health Status   Allergies:    Allergic Reactions (Selected)  Severity Not Documented  Anabolic steroids- No reactions were documented.  CeleBREX- No reactions were documented.  Celecoxib- No reactions were documented.  Sulfamethoxazole- No reactions were documented.,    Allergies (4) Active Reaction  anabolic steroids None Documented  CeleBREX None Documented  celecoxib None Documented  sulfamethoxazole None Documented     Current medications:  (Selected)   Inpatient Medications  Ordered  Normal Saline Infusion 1,000 mL: 1,000 mL, 1,000 mL, IV, 100 mL/hr, start date 02/15/20 11:58:00 CST, 1.52, m2  Phenergan: 12.5 mg, form: Injection, IM, q4hr PRN for nausea/vomiting, first dose 02/15/20 7:29:00 CST  Protonix: 40 mg, form: Injection, IV Slow, Daily, first dose 02/15/20 7:29:00 CST, STAT  Zofran: 4 mg, form: Injection, IV Push, q4hr PRN for nausea, first dose 02/15/20 7:29:00 CST  amlodipine 5 mg oral tablet: 5 mg, form: Tab, Oral, Daily, first dose 02/16/20 9:00:00 CST  hydroxychloroquine: 200 mg, form: Tab, Oral, Daily, first dose 02/16/20 9:00:00 CST  latanoprost 0.005% ophthalmic solution: 1 drop(s), form: Soln-Opth, Eye-Both, qPM, first dose 02/15/20 10:27:00 CST  metoprolol tartrate 25 mg oral tab: 25 mg, form: Tab, Oral, Daily, first dose 02/16/20 9:00:00 CST  Prescriptions  Prescribed  Levaquin 750 mg oral tablet: 750 mg = 1 tab(s), Oral, q24hr, X 7 day(s), # 7 tab(s), 0 Refill(s), Pharmacy: [id number], 157, cm, Height/Length Dosing, 01/09/20 14:00:00 CST, 53.97, kg, Weight Dosing, 01/09/20 14:00:00 CST  Myrbetriq 50 mg oral tablet, extended release: 50 mg = 1 tab(s), Oral, Daily, do not crush or chew, # 30 tab(s), 1 Refill(s), Pharmacy: [id number]  Phenergan 25 mg rectal suppository: 25 mg = 1 supp, CA, q4hr, PRN PRN for nausea/vomiting, # 12 supp, 0 Refill(s), Pharmacy: [id number], 157, cm, Height/Length Dosing, 20 14:00:00 CST, 53.97, kg, Weight Dosing, 20 14:00:00 CST  Tessalon 200 mg oral capsule: 200 mg = 1 cap(s), Oral, TID, X 7 day(s), # 21 cap(s), 0 Refill(s), Pharmacy: [id number], 157, cm, Height/Length Dosing, 20 14:00:00 CST, 53.97, kg, Weight Dosing, 20 14:00:00 CST  Tylenol No 3 oral tablet: See Instructions, 1 po q 8 hours, # 30 tab(s), 0 Refill(s)  Ventolin HFA 90 mcg/inh inhalation aerosol: 1 puff(s), INH, q4hr, PRN PRN as needed for wheezing, # 8 gm, 0 Refill(s), Pharmacy: [id number], 157, cm, Height/Length Dosing, 20 14:00:00 CST, 53.97, kg, Weight Dosing, 20 14:00:00 CST  Zofran ODT 4 mg oral tablet, disintegratin mg = 1 tab(s), Oral, q8hr, PRN PRN as needed for nausea/vomiting, # 15 tab(s), 0 Refill(s), Pharmacy: [id number], 157, cm, Height/Length Dosing, 20 14:00:00 CST, 53.97, kg, Weight Dosing, 20 14:00:00 CST  amLODIPine 5 mg oral tablet: See Instructions, TAKE 1 TABLET TWICE A DAY, # 180 tab(s), 3 Refill(s), Pharmacy: [facility]  dextromethorphan-promethazine 15 mg-6.25 mg/5 mL oral syrup: 5 mL, Oral, q6hr, # 120 mL, 0 Refill(s), Pharmacy: [id number], 157, cm, Height/Length Dosing, 20 14:00:00 CST, 53.97, kg, Weight Dosing, 20 14:00:00 CST  hydrochlorothiazide 25 mg oral tablet: 25 mg = 1 tab(s), Oral, Daily, # 90 tab(s), 3 Refill(s), Pharmacy: [facility]  hydroxychloroquine 200 mg oral tablet: 200 mg = 1 tab(s), Oral, Daily, # 90 tab(s), 3 Refill(s), Pharmacy: [facility]  meloxicam 7.5 mg oral tablet: See Instructions, TAKE 1 TABLET DAILY, # 90 tab(s), 3 Refill(s), Pharmacy: [facility]  metoprolol tartrate 25 mg oral tab: 25 mg = 1 tab(s), Oral, Daily, # 90 tab(s), 3 Refill(s), Pharmacy: [facility]  traMADol 50 mg oral tablet: 50 mg =  1 tab(s), Oral, q12hr, PRN PRN for pain, greater than 7 days, medically necessary, # 60 tab(s), 0 Refill(s)  Documented Medications  Documented  latanoprost 0.005% ophthalmic solution: 1 drop(s), Eye-Both, qPM  solifenacin 10 mg oral tablet: 10 mg = 1 tab(s), Oral, Daily   Problem list:    All Problems  Acute back pain - lumbar / SNOMED CT 623179658 / Confirmed  Benign Essential Hypertension(  Confirmed  ) / SNOMED CT 8852498 / Confirmed  H/O: rheumatoid arthritis(  Confirmed  ) / SNOMED CT 858371415 / Confirmed  HTN (hypertension) / SNOMED CT [id number] / Confirmed  Arthralgia(  Confirmed  ) / SNOMED CT 17727271 / Confirmed  Pain / SNOMED CT [id number] / Confirmed  L shoulder pain  Scoliosis / SNOMED CT 459946724 / Confirmed  Urinary frequency / SNOMED CT 9082438464 / Confirmed      Histories   Past Medical History:    Active  HTN (hypertension) ([id number])  Resolved  Arthritis (716.90):  Resolved.  Glaucoma (13033116):  Resolved.  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  Reflux ([id number]):  Resolved.  UTI (urinary tract infection) ([id number]):  Resolved.  HTN (hypertension) ([id number]):  Resolved.  Glaucoma (39995974):  Resolved.  RA (rheumatoid arthritis) ([id number]):  Resolved.   Family History:    Alcoholism  Father ()  Hypertension.  Mother  Glaucoma.  Sister  Brother     Procedure history:    Cataract surgery (999529030).  Hip replacement (8431276601).  Knee replacement (067493387).  right foot surgery.  Excision of basal cell carcinoma (769842400).  Comments:  12/15/2013 18:42 CST - Stephanie LOVING, Alejandra PAUL.  back and breast  left knee replacemant.  right foot broken.  tosilllectomy.  cataract surgery.  right hip replacement.   left knee surgery.  right foot surgery.  Cataract extraction ([id number]).   Social History         Social & Psychosocial Habits    Alcohol  05/13/2014 Risk Assessment: Denies Alcohol Use    04/17/2018  Use: Current    Type: Wine    Frequency: 1-2 times per month    Has alcohol use interfered with work or home life? No    Do you ever drink more than intended? No    Has anyone been hurt or at risk by your drinking? No    Ready to change: No    Concerns about alcohol use in household: No    Employment/School  01/07/2014  Status: Retired    Description: clerical work    05/13/2014 Risk Assessment: No Risk    03/17/2015  Status: Retired    Exercise  05/13/2014 Risk Assessment: Does not exercise    03/17/2015  Duration (average number of minutes): 45    Times per week: Daily    Exercise type: Walking    Home/Environment  01/07/2014  Lives with: Spouse    Comment: pt is  - 01/07/2014 11:25 - Landen Mora LPN    05/13/2014 Risk Assessment: No Risk    03/17/2015  Lives with: Spouse    Nutrition/Health  05/13/2014 Risk Assessment: No Risk    03/17/2015  Type of diet: Regular    Sexual  05/13/2014 Risk Assessment: No Risk    03/17/2015  Sexually active: Yes    Substance Use  01/07/2014 Risk Assessment: Denies Substance Abuse      Comment: never - 03/17/2015 14:33 - Anita Womack    Tobacco  12/15/2013 Risk Assessment: Denies Tobacco Use    01/09/2020  Use: Never (less than 100 in l    Patient Wants Consult For Cessation Counseling N/A    02/13/2020  Use: Never (less than 100 in l    Patient Wants Consult For Cessation Counseling No    02/15/2020  Use: Never (less than 100 in l    Patient Wants Consult For Cessation Counseling N/A    Abuse/Neglect  12/18/2019  SHX Any signs of abuse or neglect No    02/13/2020  SHX Any signs of abuse or neglect No    02/15/2020  SHX Any signs of abuse or neglect No  .        Physical Examination      Vital Signs (last 24 hrs)_____  Last Charted___________  Temp Oral     36.5 DegC  (FEB 15 07:42)  Heart Rate Peripheral   77 bpm  (FEB 15 07:42)  Resp Rate         20 br/min   (FEB 15 06:16)  SBP      117 mmHg  (FEB 15 07:42)  DBP      69 mmHg  (FEB 15 07:42)  SpO2      97 %  (FEB 15 07:42)  Weight      53.3 kg  (FEB 15 07:30)  Height      157.48 cm  (FEB 15 07:30)  BMI      21.49  (FEB 15 07:30)     General:  Alert and oriented, No acute distress.    Eye:  Extraocular movements are intact.    HENT:  Normocephalic.    Neck:  Supple.    Respiratory:  Lungs are clear to auscultation, Respirations are non-labored.    Cardiovascular:  Normal rate, Regular rhythm, No edema.    Gastrointestinal:  Non-distended.    Integumentary:  Warm.    Neurologic:  Alert.    Psychiatric:  Cooperative.       Review / Management   Results review:     Labs (Last four charted values)  WBC                  L 4.0 (FEB 15)   Hgb                  12.1 (FEB 15)   Hct                  L 34.7 (FEB 15)   Plt                  135 (FEB 15)   Na                   L 135 (FEB 15)   K                    3.5 (FEB 15)   CO2                  32.0 (FEB 15)   Cl                   99 (FEB 15)   Cr                   H 1.19 (FEB 15)   BUN                  H 28.0 (FEB 15)   Glucose Random       H 113 (FEB 15) .       Impression and Plan     1. Dysphagia: Esophagram this morning is normal. There is no evidence of foreign body, hiatal hernia, GERD. Her symptoms of coughing and dysphagia past 5 days still could be related to reflux or chronic rhinosinusitis. She has a long history of strong gag reflex, nearly vomiting daily when brushing her teeth most of her life. IV Protonix and plan for discharge tomorrow.    2. Hypertension: Resume home medications    3. Rheumatoid arthritis: Resume hydroxychloroquine    Change fluids to normal saline.

## 2022-05-02 NOTE — HISTORICAL [facility] CERNER
This is a historical note converted from Shari. Formatting and pictures may have been removed.  Please reference Shari for original formatting and attached multimedia. Procedure Name  1. Right L2 Transforaminal Epidural Steroid Injection  2. Left L5 Transforaminal Epidural Steroid Injection  ?  Pre-Procedure Diagnoses:  1. Chronic pain syndrome  2. Low back pain  3. Lumbar radiculopathy  4. Lumbar disc displacement  5. Lumbar degenerative disc disease  ?   Post-Procedure Diagnoses:  1. Chronic pain syndrome  2. Low back pain  3. Lumbar radiculopathy  4. Lumbar disc displacement  5. Lumbar degenerative disc disease  ?   Anesthesia:  Local and MAC  ?   Estimated Blood Loss:  None  ?  Complications:  None  ?  Informed Consent:  The procedure, risks, benefits, and alternatives were discussed with the patient.? There were no contraindications to the procedure.? The patient expressed understanding and agreed to proceed.? Fully informed written consent was obtained.?  ?  Description of the Procedure:  The patient was taken to the operating room.? IV access was obtained prior to the start of the procedure.? The patient was positioned prone on the fluoroscopy table.? Continuous hemodynamic monitoring was initiated and continued throughout the duration of the procedure.? The skin overlying the lumbosacral spine was prepped with Chloraprep and draped into a sterile field.? An oblique fluoroscopic view was obtained on the right side at L2, with the superior articular process of the inferior vertebral body aligned with the pedicle.? Skin anesthesia was achieved using 1 mL of 1% lidocaine.? A 22-gauge 3.5-inch Quinke spinal needle was slowly inserted and advanced towards the 6 oclock position of the pedicle and into the epidural space.? Proper needle position was confirmed under AP, oblique, and lateral fluoroscopic views.? Negative aspiration for blood or CSF was confirmed.? 0.5 mL of Isovue contrast was injected.?  Fluoroscopic imaging revealed a clear outline of the spinal nerve with proximal spread of agent through the neural foramen and into the epidural space.? Then a combination of 5 mg of dexamethasone and 1 mL of 0.25% bupivacaine was easily injected.? There was no pain on injection.? The needle was removed intact and bleeding was nil.? The same procedure was repeated in identical fashion on the left side at L5. Sterile bandages were applied.? The patient was taken to the recovery room for further observation in stable condition.? The patient was then discharged home without any complications.

## 2022-05-03 ENCOUNTER — OFFICE VISIT (OUTPATIENT)
Dept: INTERNAL MEDICINE | Facility: CLINIC | Age: 87
End: 2022-05-03
Payer: MEDICARE

## 2022-05-03 VITALS
OXYGEN SATURATION: 98 % | HEART RATE: 61 BPM | BODY MASS INDEX: 18.88 KG/M2 | HEIGHT: 61 IN | RESPIRATION RATE: 14 BRPM | WEIGHT: 100 LBS | SYSTOLIC BLOOD PRESSURE: 118 MMHG | DIASTOLIC BLOOD PRESSURE: 68 MMHG

## 2022-05-03 DIAGNOSIS — M54.41 CHRONIC BILATERAL LOW BACK PAIN WITH BILATERAL SCIATICA: ICD-10-CM

## 2022-05-03 DIAGNOSIS — F41.9 ANXIETY AND DEPRESSION: ICD-10-CM

## 2022-05-03 DIAGNOSIS — F32.A ANXIETY AND DEPRESSION: ICD-10-CM

## 2022-05-03 DIAGNOSIS — H91.90 HEARING LOSS, UNSPECIFIED HEARING LOSS TYPE, UNSPECIFIED LATERALITY: ICD-10-CM

## 2022-05-03 DIAGNOSIS — G89.29 CHRONIC BILATERAL LOW BACK PAIN WITH BILATERAL SCIATICA: ICD-10-CM

## 2022-05-03 DIAGNOSIS — I10 PRIMARY HYPERTENSION: ICD-10-CM

## 2022-05-03 DIAGNOSIS — R35.0 FREQUENT URINATION: ICD-10-CM

## 2022-05-03 DIAGNOSIS — K21.9 GASTROESOPHAGEAL REFLUX DISEASE, UNSPECIFIED WHETHER ESOPHAGITIS PRESENT: ICD-10-CM

## 2022-05-03 DIAGNOSIS — M25.511 ACUTE PAIN OF RIGHT SHOULDER: ICD-10-CM

## 2022-05-03 DIAGNOSIS — R63.4 WEIGHT LOSS: Primary | ICD-10-CM

## 2022-05-03 DIAGNOSIS — M06.9 RHEUMATOID ARTHRITIS, INVOLVING UNSPECIFIED SITE, UNSPECIFIED WHETHER RHEUMATOID FACTOR PRESENT: ICD-10-CM

## 2022-05-03 DIAGNOSIS — R53.83 FATIGUE, UNSPECIFIED TYPE: ICD-10-CM

## 2022-05-03 DIAGNOSIS — H40.9 GLAUCOMA, UNSPECIFIED GLAUCOMA TYPE, UNSPECIFIED LATERALITY: ICD-10-CM

## 2022-05-03 DIAGNOSIS — A80.9 POLIO: ICD-10-CM

## 2022-05-03 DIAGNOSIS — R20.0 BILATERAL HAND NUMBNESS: ICD-10-CM

## 2022-05-03 DIAGNOSIS — N39.0 FREQUENT UTI: ICD-10-CM

## 2022-05-03 DIAGNOSIS — M19.90 OSTEOARTHRITIS, UNSPECIFIED OSTEOARTHRITIS TYPE, UNSPECIFIED SITE: ICD-10-CM

## 2022-05-03 DIAGNOSIS — M54.42 CHRONIC BILATERAL LOW BACK PAIN WITH BILATERAL SCIATICA: ICD-10-CM

## 2022-05-03 DIAGNOSIS — N28.9 RENAL INSUFFICIENCY: ICD-10-CM

## 2022-05-03 DIAGNOSIS — H61.21 HEARING LOSS OF RIGHT EAR DUE TO CERUMEN IMPACTION: ICD-10-CM

## 2022-05-03 DIAGNOSIS — M19.90 ARTHRITIS: ICD-10-CM

## 2022-05-03 PROCEDURE — 3074F SYST BP LT 130 MM HG: CPT | Mod: CPTII,,, | Performed by: NURSE PRACTITIONER

## 2022-05-03 PROCEDURE — 99214 PR OFFICE/OUTPT VISIT, EST, LEVL IV, 30-39 MIN: ICD-10-PCS | Mod: ,,, | Performed by: NURSE PRACTITIONER

## 2022-05-03 PROCEDURE — 1101F PR PT FALLS ASSESS DOC 0-1 FALLS W/OUT INJ PAST YR: ICD-10-PCS | Mod: CPTII,,, | Performed by: NURSE PRACTITIONER

## 2022-05-03 PROCEDURE — 3078F DIAST BP <80 MM HG: CPT | Mod: CPTII,,, | Performed by: NURSE PRACTITIONER

## 2022-05-03 PROCEDURE — 1159F MED LIST DOCD IN RCRD: CPT | Mod: CPTII,,, | Performed by: NURSE PRACTITIONER

## 2022-05-03 PROCEDURE — 3288F PR FALLS RISK ASSESSMENT DOCUMENTED: ICD-10-PCS | Mod: CPTII,,, | Performed by: NURSE PRACTITIONER

## 2022-05-03 PROCEDURE — 3078F PR MOST RECENT DIASTOLIC BLOOD PRESSURE < 80 MM HG: ICD-10-PCS | Mod: CPTII,,, | Performed by: NURSE PRACTITIONER

## 2022-05-03 PROCEDURE — 1159F PR MEDICATION LIST DOCUMENTED IN MEDICAL RECORD: ICD-10-PCS | Mod: CPTII,,, | Performed by: NURSE PRACTITIONER

## 2022-05-03 PROCEDURE — 1101F PT FALLS ASSESS-DOCD LE1/YR: CPT | Mod: CPTII,,, | Performed by: NURSE PRACTITIONER

## 2022-05-03 PROCEDURE — 3074F PR MOST RECENT SYSTOLIC BLOOD PRESSURE < 130 MM HG: ICD-10-PCS | Mod: CPTII,,, | Performed by: NURSE PRACTITIONER

## 2022-05-03 PROCEDURE — 1160F RVW MEDS BY RX/DR IN RCRD: CPT | Mod: CPTII,,, | Performed by: NURSE PRACTITIONER

## 2022-05-03 PROCEDURE — 1160F PR REVIEW ALL MEDS BY PRESCRIBER/CLIN PHARMACIST DOCUMENTED: ICD-10-PCS | Mod: CPTII,,, | Performed by: NURSE PRACTITIONER

## 2022-05-03 PROCEDURE — 99214 OFFICE O/P EST MOD 30 MIN: CPT | Mod: ,,, | Performed by: NURSE PRACTITIONER

## 2022-05-03 PROCEDURE — 3288F FALL RISK ASSESSMENT DOCD: CPT | Mod: CPTII,,, | Performed by: NURSE PRACTITIONER

## 2022-05-03 RX ORDER — MEGESTROL ACETATE 40 MG/ML
200 SUSPENSION ORAL DAILY
Qty: 150 ML | Refills: 0 | Status: SHIPPED | OUTPATIENT
Start: 2022-05-03 | End: 2023-10-16

## 2022-05-03 RX ORDER — PANTOPRAZOLE SODIUM 40 MG/1
40 TABLET, DELAYED RELEASE ORAL DAILY
COMMUNITY
Start: 2022-04-04 | End: 2023-10-16

## 2022-05-03 RX ORDER — MIRABEGRON 50 MG/1
1 TABLET, FILM COATED, EXTENDED RELEASE ORAL DAILY
COMMUNITY
Start: 2022-04-14 | End: 2022-05-19 | Stop reason: SDUPTHER

## 2022-05-03 RX ORDER — AMOXICILLIN 500 MG/1
500 CAPSULE ORAL 2 TIMES DAILY
COMMUNITY
Start: 2022-04-20 | End: 2023-06-29 | Stop reason: ALTCHOICE

## 2022-05-03 RX ORDER — MELOXICAM 7.5 MG/1
7.5 TABLET ORAL DAILY
COMMUNITY
Start: 2022-04-05 | End: 2022-07-12 | Stop reason: SDUPTHER

## 2022-05-03 RX ORDER — METOPROLOL SUCCINATE 50 MG/1
50 TABLET, EXTENDED RELEASE ORAL DAILY
COMMUNITY
Start: 2022-04-05 | End: 2023-04-20 | Stop reason: SDUPTHER

## 2022-05-03 RX ORDER — BIMATOPROST 0.1 MG/ML
1 SOLUTION/ DROPS OPHTHALMIC 2 TIMES DAILY
COMMUNITY
Start: 2022-03-23 | End: 2023-10-16

## 2022-05-03 RX ORDER — DULOXETIN HYDROCHLORIDE 30 MG/1
30 CAPSULE, DELAYED RELEASE ORAL DAILY
Qty: 30 CAPSULE | Refills: 3 | Status: SHIPPED | OUTPATIENT
Start: 2022-05-03 | End: 2023-06-22

## 2022-05-03 NOTE — PROGRESS NOTES
Subjective:       Patient ID: Danae Mora is a 86 y.o. female.    Chief Complaint: Follow-up (Pt states she is here for f/u going to derm next week pt wants a complete physical, her right shoulder is hurting, she thinks she never really had a UTI but only has a couple days of Abx left , 12:30 she goes into a decline and says Myrbetric does not work also hearing aid does not work and she cannot here)    HPI     86 year old female here today for weight recheck.  Additionally, issues with chronic fatigue, specifically after 12 noon. She is not sleeping past 3 AM due to need to void.  Recently dx with UTI with RX of Amoxil she will complete in 2 days. Sleeping about 6 hr/night.  Frequent awakening d/t generalized pain. Urination problem has persisted and is noted to be worse. Myrbetriq ill-effective. Some R shoulder pain, bilateral hand numbness, and worsening hearing. Chronic pains, primarily to low back. Rates pain today 4-5/10. Taking Tylenol Extra Strength twice daily. Some anxiety and depression given she and 's health issues. She reports she is drinking Ensure 3x/day plus meals, although appetite is dec and she continues to lose weight (ie. 5lb loss over the last 1 month).    Review of Systems   Constitutional: Positive for activity change, appetite change, fatigue and unexpected weight change. Negative for chills, diaphoresis and fever.   HENT: Positive for hearing loss. Negative for nasal congestion, postnasal drip, rhinorrhea, sinus pressure/congestion and tinnitus.    Respiratory: Negative for apnea, cough, choking, chest tightness, shortness of breath, wheezing and stridor.    Cardiovascular: Negative for chest pain, palpitations, leg swelling and claudication.   Gastrointestinal: Negative for abdominal pain, anal bleeding, blood in stool, change in bowel habit, nausea, vomiting and change in bowel habit.   Endocrine: Positive for heat intolerance. Negative for cold intolerance, polydipsia and  polyphagia.   Genitourinary: Positive for bladder incontinence, frequency, hot flashes and nocturia. Negative for difficulty urinating, dysuria, flank pain, hematuria and urgency.   Musculoskeletal: Positive for myalgias (R shoulder pain). Negative for arthralgias, gait problem, joint swelling and joint deformity.   Integumentary:  Positive for rash (L arm skin lesion improving, multiple more lesions to L chest and L leg) and mole/lesion.   Neurological: Negative for dizziness, light-headedness, headaches and memory loss.   Psychiatric/Behavioral: Positive for agitation. Negative for confusion, decreased concentration, dysphoric mood and sleep disturbance. The patient is not nervous/anxious.          Objective:      Physical Exam  Vitals and nursing note reviewed.   Constitutional:       Appearance: Normal appearance. She is normal weight.   HENT:      Head: Normocephalic and atraumatic.      Right Ear: There is impacted cerumen.      Left Ear: Tympanic membrane, ear canal and external ear normal.      Nose: Nose normal.      Mouth/Throat:      Mouth: Mucous membranes are moist.      Pharynx: Oropharynx is clear.   Eyes:      Extraocular Movements: Extraocular movements intact.      Conjunctiva/sclera: Conjunctivae normal.      Pupils: Pupils are equal, round, and reactive to light.   Cardiovascular:      Rate and Rhythm: Normal rate and regular rhythm.      Pulses: Normal pulses.      Heart sounds: Normal heart sounds.   Pulmonary:      Effort: Pulmonary effort is normal.      Breath sounds: Normal breath sounds.   Abdominal:      General: Abdomen is flat. Bowel sounds are normal.      Palpations: Abdomen is soft.   Musculoskeletal:         General: Normal range of motion.      Cervical back: Normal range of motion and neck supple.   Skin:     General: Skin is warm and dry.   Neurological:      General: No focal deficit present.      Mental Status: She is alert and oriented to person, place, and time. Mental status  is at baseline.   Psychiatric:         Mood and Affect: Mood normal.         Thought Content: Thought content normal.         Judgment: Judgment normal.         Assessment:       1. Weight loss    2. Fatigue, unspecified type    3. Frequent urination    4. Osteoarthritis, unspecified osteoarthritis type, unspecified site    5. Chronic bilateral low back pain with bilateral sciatica    6. Hearing loss, unspecified hearing loss type, unspecified laterality    7. Bilateral hand numbness      Plan:       1. Trial of Megace daily.  Rec high protein diet. F/u 6 weeks for recheck with Dr. Topete.    2.  Will reassess BMP. Inc water intake.  Not sleeping well for multiple reasons.    3. Repeat UA    4.  XR R shoulder.  Tylenol Arthritis 2 tabs up to 3x/day. Avoid NSAIDs.      5.  Trial of Cymbalta 30mg daily to possibly inc to 30mg BID dosing if marycruz.     6. Cerumen impaction noted.  Posterior traction applied to R helix, cerumen removed per gentle jet irrigation and curettage.    7. Consider eval of neck with designated XR.    Advised to keep scheduled f/u with Derm next week.

## 2022-05-04 PROBLEM — M06.9 RHEUMATOID ARTHRITIS: Status: ACTIVE | Noted: 2022-05-04

## 2022-05-04 PROBLEM — I10 HYPERTENSION: Status: ACTIVE | Noted: 2022-05-04

## 2022-05-04 PROBLEM — A80.9 POLIO: Status: ACTIVE | Noted: 2022-05-04

## 2022-05-04 PROBLEM — N39.0 FREQUENT UTI: Status: ACTIVE | Noted: 2022-05-04

## 2022-05-04 PROBLEM — K21.9 GERD (GASTROESOPHAGEAL REFLUX DISEASE): Status: ACTIVE | Noted: 2022-05-04

## 2022-05-04 PROBLEM — M19.90 ARTHRITIS: Status: ACTIVE | Noted: 2022-05-04

## 2022-05-04 PROBLEM — H40.9 GLAUCOMA: Status: ACTIVE | Noted: 2022-05-04

## 2022-05-05 ENCOUNTER — TELEPHONE (OUTPATIENT)
Dept: INTERNAL MEDICINE | Facility: CLINIC | Age: 87
End: 2022-05-05
Payer: MEDICARE

## 2022-05-05 ENCOUNTER — HOSPITAL ENCOUNTER (OUTPATIENT)
Dept: RADIOLOGY | Facility: HOSPITAL | Age: 87
Discharge: HOME | End: 2022-05-05
Attending: NURSE PRACTITIONER
Payer: MEDICARE

## 2022-05-05 DIAGNOSIS — M25.511 ACUTE PAIN OF RIGHT SHOULDER: ICD-10-CM

## 2022-05-05 PROCEDURE — 73030 X-RAY EXAM OF SHOULDER: CPT | Mod: TC,RT

## 2022-05-05 RX ORDER — AMLODIPINE BESYLATE 5 MG/1
5 TABLET ORAL 2 TIMES DAILY
Qty: 60 TABLET | Refills: 3 | Status: SHIPPED | OUTPATIENT
Start: 2022-05-05 | End: 2023-03-15 | Stop reason: SDUPTHER

## 2022-05-05 NOTE — TELEPHONE ENCOUNTER
Called and lvm for pt to call back regarding results.        ----- Message from Shikha Grider NP sent at 5/5/2022  2:01 PM CDT -----  Please let pt know that XR of L shoulder notes deg OA.  Would consider referral to Ortho, if she would like.  Also, UA continues to note bacteria and blood. Being she was recently tx with antibiotics, would like to await final C &S. Labs note mild dehydration. Rec inc in water intake.  How is she feeling?

## 2022-05-06 RX ORDER — CIPROFLOXACIN 250 MG/1
250 TABLET, FILM COATED ORAL EVERY 12 HOURS
Qty: 7 TABLET | Refills: 0 | Status: SHIPPED | OUTPATIENT
Start: 2022-05-06 | End: 2023-06-22

## 2022-05-09 ENCOUNTER — TELEPHONE (OUTPATIENT)
Dept: INTERNAL MEDICINE | Facility: CLINIC | Age: 87
End: 2022-05-09
Payer: MEDICARE

## 2022-05-11 DIAGNOSIS — G89.29 CHRONIC RIGHT SHOULDER PAIN: Primary | ICD-10-CM

## 2022-05-11 DIAGNOSIS — M25.511 CHRONIC RIGHT SHOULDER PAIN: Primary | ICD-10-CM

## 2022-05-17 ENCOUNTER — TELEPHONE (OUTPATIENT)
Dept: INTERNAL MEDICINE | Facility: CLINIC | Age: 87
End: 2022-05-17
Payer: MEDICARE

## 2022-05-17 NOTE — TELEPHONE ENCOUNTER
Pt called requesting for Lisette to return her call. Received a voicemail but didn't understand the message.    CB# [phone]

## 2022-05-17 NOTE — TELEPHONE ENCOUNTER
----- Message from Shikha Grider NP sent at 5/17/2022 10:20 AM CDT -----  Reviewed. She was tx with Cipro, which cx sensitive. Any further s/s of dysuria?

## 2022-05-19 ENCOUNTER — TELEPHONE (OUTPATIENT)
Dept: INTERNAL MEDICINE | Facility: CLINIC | Age: 87
End: 2022-05-19
Payer: MEDICARE

## 2022-05-19 DIAGNOSIS — N32.81 OAB (OVERACTIVE BLADDER): Primary | ICD-10-CM

## 2022-05-19 DIAGNOSIS — N32.81 OAB (OVERACTIVE BLADDER): ICD-10-CM

## 2022-05-19 RX ORDER — MIRABEGRON 50 MG/1
1 TABLET, FILM COATED, EXTENDED RELEASE ORAL DAILY
Qty: 90 TABLET | Refills: 5 | Status: SHIPPED | OUTPATIENT
Start: 2022-05-19 | End: 2022-05-19 | Stop reason: SDUPTHER

## 2022-05-19 RX ORDER — MIRABEGRON 50 MG/1
1 TABLET, FILM COATED, EXTENDED RELEASE ORAL DAILY
Qty: 90 TABLET | Refills: 5 | Status: SHIPPED | OUTPATIENT
Start: 2022-05-19 | End: 2023-06-22

## 2022-05-19 NOTE — TELEPHONE ENCOUNTER
"Pt called stating "Can you ask Shikha to send in meds of Myrbetriq 50mg to [facility].  Can you also let her know that my first cancer treatment is on 06/03/22 @ 7:00"    **I am unable to Propose Myrbetriq medication, it is not pulling up as a medication in the system      "

## 2022-06-01 ENCOUNTER — OFFICE VISIT (OUTPATIENT)
Dept: ORTHOPEDICS | Facility: CLINIC | Age: 87
End: 2022-06-01
Payer: MEDICARE

## 2022-06-01 VITALS
BODY MASS INDEX: 18.89 KG/M2 | DIASTOLIC BLOOD PRESSURE: 68 MMHG | SYSTOLIC BLOOD PRESSURE: 118 MMHG | HEART RATE: 70 BPM | HEIGHT: 61 IN | WEIGHT: 100.06 LBS

## 2022-06-01 DIAGNOSIS — G89.29 CHRONIC RIGHT SHOULDER PAIN: Primary | ICD-10-CM

## 2022-06-01 DIAGNOSIS — M25.511 CHRONIC RIGHT SHOULDER PAIN: Primary | ICD-10-CM

## 2022-06-01 PROCEDURE — 1159F PR MEDICATION LIST DOCUMENTED IN MEDICAL RECORD: ICD-10-PCS | Mod: CPTII,,, | Performed by: REHABILITATION UNIT

## 2022-06-01 PROCEDURE — 1125F AMNT PAIN NOTED PAIN PRSNT: CPT | Mod: CPTII,,, | Performed by: REHABILITATION UNIT

## 2022-06-01 PROCEDURE — 20610 DRAIN/INJ JOINT/BURSA W/O US: CPT | Mod: RT,,, | Performed by: REHABILITATION UNIT

## 2022-06-01 PROCEDURE — 20610 LARGE JOINT ASPIRATION/INJECTION: R SUBACROMIAL BURSA: ICD-10-PCS | Mod: RT,,, | Performed by: REHABILITATION UNIT

## 2022-06-01 PROCEDURE — 3078F DIAST BP <80 MM HG: CPT | Mod: CPTII,,, | Performed by: REHABILITATION UNIT

## 2022-06-01 PROCEDURE — 1159F MED LIST DOCD IN RCRD: CPT | Mod: CPTII,,, | Performed by: REHABILITATION UNIT

## 2022-06-01 PROCEDURE — 99203 OFFICE O/P NEW LOW 30 MIN: CPT | Mod: 25,,, | Performed by: REHABILITATION UNIT

## 2022-06-01 PROCEDURE — 99203 PR OFFICE/OUTPT VISIT, NEW, LEVL III, 30-44 MIN: ICD-10-PCS | Mod: 25,,, | Performed by: REHABILITATION UNIT

## 2022-06-01 PROCEDURE — 1125F PR PAIN SEVERITY QUANTIFIED, PAIN PRESENT: ICD-10-PCS | Mod: CPTII,,, | Performed by: REHABILITATION UNIT

## 2022-06-01 PROCEDURE — 3074F PR MOST RECENT SYSTOLIC BLOOD PRESSURE < 130 MM HG: ICD-10-PCS | Mod: CPTII,,, | Performed by: REHABILITATION UNIT

## 2022-06-01 PROCEDURE — 3074F SYST BP LT 130 MM HG: CPT | Mod: CPTII,,, | Performed by: REHABILITATION UNIT

## 2022-06-01 PROCEDURE — 3078F PR MOST RECENT DIASTOLIC BLOOD PRESSURE < 80 MM HG: ICD-10-PCS | Mod: CPTII,,, | Performed by: REHABILITATION UNIT

## 2022-06-01 RX ADMIN — BETAMETHASONE SODIUM PHOSPHATE AND BETAMETHASONE ACETATE 6 MG: 3; 3 INJECTION, SUSPENSION INTRA-ARTICULAR; INTRALESIONAL; INTRAMUSCULAR; SOFT TISSUE at 03:06

## 2022-06-01 RX ADMIN — LIDOCAINE HYDROCHLORIDE 2 ML: 20 INJECTION, SOLUTION INFILTRATION; PERINEURAL at 03:06

## 2022-06-01 NOTE — PROGRESS NOTES
Subjective:      Patient ID: Danae Mora is a 86 y.o. female.    Chief Complaint: Shoulder Pain (NP, C/O right shoulder pain, no injuries or previous treatmetns. Xr done 5/3/22 with PCP. Was told she had rheumatoid arthritis. Sent to ortho..)    HPI:  Danae Mora is a 86F who presents today for initial evaluation of her right shoulder.  She reports several month history of right shoulder pain.  She had recent radiographs of her right shoulder by her primary care provider and was referred for orthopedic evaluation.  She has not had any formal treatment including physical therapy, injections, or medications.  She reports baseline neuropathy into all of her extremities.  Pain is localized to the anterior lateral aspect of her shoulder.  Pain is worse with activity and better with rest.  She has not lost any significant range of motion.  She reports back pain and has seen a chiropractor in the past.    Past Medical History:   Diagnosis Date    History of degenerative disc disease     Scoliosis      Past Surgical History:   Procedure Laterality Date    HIP SURGERY      right hip replacement    KNEE SURGERY      left knee replacement     Social History     Socioeconomic History    Marital status:    Tobacco Use    Smoking status: Never Smoker    Smokeless tobacco: Never Used         Current Outpatient Medications:     amLODIPine (NORVASC) 5 MG tablet, Take 1 tablet (5 mg total) by mouth 2 (two) times a day., Disp: 60 tablet, Rfl: 3    amoxicillin (AMOXIL) 500 MG capsule, Take 500 mg by mouth 2 (two) times daily., Disp: , Rfl:     chlorpheniramine-pseudoephedrine-acetaminophen (SINUTAB) 2- mg per tablet, Take 500 mg by mouth., Disp: , Rfl:     ciprofloxacin HCl (CIPRO) 250 MG tablet, Take 1 tablet (250 mg total) by mouth every 12 (twelve) hours., Disp: 7 tablet, Rfl: 0    clopidogreL (PLAVIX) 75 mg tablet, Take 75 mg by mouth., Disp: , Rfl:     DULoxetine (CYMBALTA) 30 MG capsule, Take 1  "capsule (30 mg total) by mouth once daily., Disp: 30 capsule, Rfl: 3    hydrOXYchloroQUINE (PLAQUENIL) 200 mg tablet, Take 200 mg by mouth., Disp: , Rfl:     LUMIGAN 0.01 % Drop, Place 1 drop into both eyes 2 (two) times a day., Disp: , Rfl:     megestroL (MEGACE) 400 mg/10 mL (40 mg/mL) Susp, Take 5 mLs (200 mg total) by mouth once daily., Disp: 150 mL, Rfl: 0    meloxicam (MOBIC) 7.5 MG tablet, Take 7.5 mg by mouth once daily., Disp: , Rfl:     metoprolol succinate (TOPROL-XL) 50 MG 24 hr tablet, Take 50 mg by mouth once daily., Disp: , Rfl:     MYRBETRIQ 50 mg Tb24, Take 1 tablet (50 mg total) by mouth once daily., Disp: 90 tablet, Rfl: 5    pantoprazole (PROTONIX) 40 MG tablet, Take 40 mg by mouth once daily., Disp: , Rfl:   Review of patient's allergies indicates:   Allergen Reactions    Celebrex [celecoxib] Anaphylaxis    Gabapentin     Sulfamethoxazole        /68   Pulse 70   Ht 5' 1" (1.549 m)   Wt 45.4 kg (100 lb 1.4 oz)   BMI 18.91 kg/m²     ROS   Comprehensive review of systems completed and negative except as per HPI.      Objective:    Ortho Exam       Head: Normocephalic, without obvious abnormality, atraumatic  Eyes: conjunctivae/corneas clear. EOM's intact  Ears: normal external appearance  Nose: Nares normal. Septum midline. Mucosa normal. No drainage  Throat: normal findings: lips normal without lesions  Lungs: unlabored breathing on room air  Chest wall: symmetric chest rise  Heart: regular rate and rhythm  Pulses: 2+ and symmetric  Skin: Skin color, texture, turgor normal. No rashes or lesions  Neurologic: Grossly normal    Right SHOULDER    Appearance:   Atrophy in the supraspinatus and infraspinatus fossas     Cervical Spine:   Mild ttp; decreased ROM with pain; negative Spurlings    Tenderness:   anterior    AROM:   FF full, Abduction full, ER full, IR full    PROM:  same    Pain:  AROM: positive at the extremes   PROM:  negative  End ROM: positive  Supraspinatus strength " testing: negative  External rotation strength testing: negative  Lucía-scapular: negative  Virtually all provacative maneuvers negative    Strength:  Supraspinatus: intact  External rotation: intact  Lucía-scapular: intact    Provocative Maneuvers:     Rotator Cuff/Biceps/AC Joint  Neer's Sign: positive  Hawkin's Test: negative  Painful arc: negative  Belly Press: negative  Bear Hugger Test: negative  Hornblower's Sign: negative  Speed's Test: negative  Yergeson's Test: negative  Cross Arm Abduction: positive    Pulses: Palpable radial pulse    Neurological deficits: None    The patient has a warm and well-perfused upper extremity with capillary refill less than 2 seconds. Sensation is intact to light touch in terminal nerve distributions. The patient has no palpable epitrochlear lymphadenopathy.      Large Joint Aspiration/Injection: R subacromial bursa    Date/Time: 6/1/2022 3:45 PM  Performed by: Derian Saavedra MD  Authorized by: Derian Saavedra MD     Consent Done?:  Yes (Verbal)  Indications:  Pain  Site marked: the procedure site was marked    Timeout: prior to procedure the correct patient, procedure, and site was verified    Prep: patient was prepped and draped in usual sterile fashion      Local anesthesia used?: Yes    Local anesthetic:  Topical anesthetic    Details:  Needle Size:  21 G  Ultrasonic Guidance for needle placement?: No    Approach:  Posterior  Location:  Shoulder  Site:  R subacromial bursa  Medications:  2 mL LIDOcaine HCL 20 mg/ml (2%) 20 mg/mL (2 %); 6 mg betamethasone acetate-betamethasone sodium phosphate 6 mg/mL  Patient tolerance:  Patient tolerated the procedure well with no immediate complications      Assessment:     Imaging:   X-ray Shoulder 2 or More Views Right  Narrative: EXAMINATION:  XR SHOULDER COMPLETE 2 OR MORE VIEWS RIGHT    CLINICAL HISTORY:  pain;Pain in right shoulder    COMPARISON:  None available    FINDINGS:  Mild degenerative changes involve the right  acromioclavicular joint.  Glenohumeral articulation appears to be unremarkable.  No acute fracture or dislocation identified.  Impression: Acromioclavicular mild degenerative osteoarthritic changes.    Electronically signed by: Daniel Saxena  Date:    05/05/2022  Time:    11:05        1. Chronic right shoulder pain          Plan:          Imaging and exam findings discussed with the patient.  She has atraumatic right shoulder pain for several months.  She has overall good range of motion and good strength of her rotator cuff.  She does have some degenerative changes within the shoulder and some signs of impingement.  We discussed her options and she elects to proceed with right subacromial corticosteroid injection.  This was provided as above and she tolerated it well.  She will do home exercises rather than attend physical therapy. NSAIDs.  I will see her back in 6 weeks for repeat clinical evaluation.  All of her questions were answered and she was in agreement with the plan.

## 2022-06-02 ENCOUNTER — TELEPHONE (OUTPATIENT)
Dept: INTERNAL MEDICINE | Facility: CLINIC | Age: 87
End: 2022-06-02
Payer: MEDICARE

## 2022-06-02 RX ORDER — LIDOCAINE HYDROCHLORIDE 20 MG/ML
2 INJECTION, SOLUTION INFILTRATION; PERINEURAL
Status: DISCONTINUED | OUTPATIENT
Start: 2022-06-01 | End: 2022-06-02 | Stop reason: HOSPADM

## 2022-06-02 RX ORDER — BETAMETHASONE SODIUM PHOSPHATE AND BETAMETHASONE ACETATE 3; 3 MG/ML; MG/ML
6 INJECTION, SUSPENSION INTRA-ARTICULAR; INTRALESIONAL; INTRAMUSCULAR; SOFT TISSUE
Status: DISCONTINUED | OUTPATIENT
Start: 2022-06-01 | End: 2022-06-02 | Stop reason: HOSPADM

## 2022-06-02 NOTE — TELEPHONE ENCOUNTER
----- Message from Cheryl Lejeune sent at 6/2/2022  8:49 AM CDT -----  Regarding: RE: tosha pham 6/8 1500  Left vm regarding OV and check in protocol.  ----- Message -----  From: Hannah Chávez LPN  Sent: 6/1/2022   9:39 PM CDT  To: Na Lejeune  Subject: tosha pham 6/8 1500                                Are there any outstanding tasks in chart? No    Is there any documentation of tasks? No    Has the pt seen another physician, been to ER, C, or admitted to hospital since last visit?    Has the pt done blood work or imaging since last visit?

## 2022-06-08 ENCOUNTER — OFFICE VISIT (OUTPATIENT)
Dept: INTERNAL MEDICINE | Facility: CLINIC | Age: 87
End: 2022-06-08
Payer: MEDICARE

## 2022-06-08 VITALS
WEIGHT: 105 LBS | OXYGEN SATURATION: 98 % | RESPIRATION RATE: 17 BRPM | HEART RATE: 67 BPM | HEIGHT: 61 IN | BODY MASS INDEX: 19.83 KG/M2 | DIASTOLIC BLOOD PRESSURE: 70 MMHG | SYSTOLIC BLOOD PRESSURE: 148 MMHG

## 2022-06-08 DIAGNOSIS — K21.00 GASTROESOPHAGEAL REFLUX DISEASE WITH ESOPHAGITIS WITHOUT HEMORRHAGE: ICD-10-CM

## 2022-06-08 DIAGNOSIS — I10 PRIMARY HYPERTENSION: Primary | ICD-10-CM

## 2022-06-08 DIAGNOSIS — M25.511 CHRONIC RIGHT SHOULDER PAIN: ICD-10-CM

## 2022-06-08 DIAGNOSIS — N39.0 FREQUENT UTI: ICD-10-CM

## 2022-06-08 DIAGNOSIS — G89.29 CHRONIC RIGHT SHOULDER PAIN: ICD-10-CM

## 2022-06-08 DIAGNOSIS — M06.9 RHEUMATOID ARTHRITIS INVOLVING MULTIPLE SITES, UNSPECIFIED WHETHER RHEUMATOID FACTOR PRESENT: ICD-10-CM

## 2022-06-08 PROCEDURE — 3078F PR MOST RECENT DIASTOLIC BLOOD PRESSURE < 80 MM HG: ICD-10-PCS | Mod: CPTII,,, | Performed by: INTERNAL MEDICINE

## 2022-06-08 PROCEDURE — 3078F DIAST BP <80 MM HG: CPT | Mod: CPTII,,, | Performed by: INTERNAL MEDICINE

## 2022-06-08 PROCEDURE — 1160F RVW MEDS BY RX/DR IN RCRD: CPT | Mod: CPTII,,, | Performed by: INTERNAL MEDICINE

## 2022-06-08 PROCEDURE — 3288F PR FALLS RISK ASSESSMENT DOCUMENTED: ICD-10-PCS | Mod: CPTII,,, | Performed by: INTERNAL MEDICINE

## 2022-06-08 PROCEDURE — 1101F PR PT FALLS ASSESS DOC 0-1 FALLS W/OUT INJ PAST YR: ICD-10-PCS | Mod: CPTII,,, | Performed by: INTERNAL MEDICINE

## 2022-06-08 PROCEDURE — 3077F SYST BP >= 140 MM HG: CPT | Mod: CPTII,,, | Performed by: INTERNAL MEDICINE

## 2022-06-08 PROCEDURE — 1159F MED LIST DOCD IN RCRD: CPT | Mod: CPTII,,, | Performed by: INTERNAL MEDICINE

## 2022-06-08 PROCEDURE — 1101F PT FALLS ASSESS-DOCD LE1/YR: CPT | Mod: CPTII,,, | Performed by: INTERNAL MEDICINE

## 2022-06-08 PROCEDURE — 1159F PR MEDICATION LIST DOCUMENTED IN MEDICAL RECORD: ICD-10-PCS | Mod: CPTII,,, | Performed by: INTERNAL MEDICINE

## 2022-06-08 PROCEDURE — 99214 PR OFFICE/OUTPT VISIT, EST, LEVL IV, 30-39 MIN: ICD-10-PCS | Mod: ,,, | Performed by: INTERNAL MEDICINE

## 2022-06-08 PROCEDURE — 99214 OFFICE O/P EST MOD 30 MIN: CPT | Mod: ,,, | Performed by: INTERNAL MEDICINE

## 2022-06-08 PROCEDURE — 3288F FALL RISK ASSESSMENT DOCD: CPT | Mod: CPTII,,, | Performed by: INTERNAL MEDICINE

## 2022-06-08 PROCEDURE — 1160F PR REVIEW ALL MEDS BY PRESCRIBER/CLIN PHARMACIST DOCUMENTED: ICD-10-PCS | Mod: CPTII,,, | Performed by: INTERNAL MEDICINE

## 2022-06-08 PROCEDURE — 3077F PR MOST RECENT SYSTOLIC BLOOD PRESSURE >= 140 MM HG: ICD-10-PCS | Mod: CPTII,,, | Performed by: INTERNAL MEDICINE

## 2022-06-08 NOTE — PROGRESS NOTES
Subjective:       Patient ID: Danae Mora is a 86 y.o. female.    Chief Complaint: 6 wk f/u r/t weight loss (Not taking megace-makes her sick/Not taking cymbalta-doesn't like the way it makes her feel)    HPI    86-year-old female he will follow-up history hypertension, GERD, chronic right shoulder pain who had seen Diana nicholas and referred to Dr. Curran orthopedic surgeon for evaluation of chronic right shoulder pain.  Patient underwent an intra-articular injection by Dr. Saavedra and she refers no significant relief of symptoms.  With that said will go ahead and do another shot on the anterior approach of the shoulder.  No recent falls or trauma.  Patient is prescription refill for a compound product of diclofenac and lidocaine  Review of Systems    review of systems pertinent for right shoulder pain, in the recent past he has seen a dermatologist who prescribed him with a prescription of 5  F U  to apply to her skin lesions     Objective:      Physical Exam     Patient alert in no distress  HEENT within normal limits no bruits  Heart auscultation faint murmur 2 3/6  Lungs clear bilateral no wheezing rales or rhonchi  Abdomen benign  Extremities decreased range of motion on internal and external rotation on the right shoulder,   PROCEDURENOTE    under sterile condition area cleaned with Betadine, 1 cc of 2% lidocaine with 40 mg of Solu-Medrol injected in the AC joint on the right shoulder patient tolerated well the procedure with no bleeding., sore  Assessment:       1. Chronic right shoulder pain    2. Primary hypertension    3. Frequent UTI    4. Rheumatoid arthritis involving multiple sites, unspecified whether rheumatoid factor present    5. Gastroesophageal reflux disease with esophagitis without hemorrhage      Plan:       1. See procedure note  2. Will get the nurse to final were the results of the urinalysis is at will contact the patient  3. Continue same medication prescription  4. Continue same  meds

## 2022-07-11 ENCOUNTER — TELEPHONE (OUTPATIENT)
Dept: INTERNAL MEDICINE | Facility: CLINIC | Age: 87
End: 2022-07-11
Payer: MEDICARE

## 2022-07-11 DIAGNOSIS — M06.9 RHEUMATOID ARTHRITIS INVOLVING MULTIPLE SITES, UNSPECIFIED WHETHER RHEUMATOID FACTOR PRESENT: Primary | ICD-10-CM

## 2022-07-11 NOTE — TELEPHONE ENCOUNTER
Pt called to advise that Shikha put her on Myrbetriq 50mg.  She has been taking the medication BID is not helping with bladder control.  She also indicated that the Extra Strength Tylenol is not helping with the scoliosis pain.  She would also like Dr. Topete to give her a call when he gets a chance to discuss the Dr. Wright/Dr. Mayfield Situation   Please Advise on Meds

## 2022-07-12 DIAGNOSIS — N32.81 OAB (OVERACTIVE BLADDER): Primary | ICD-10-CM

## 2022-07-12 DIAGNOSIS — N39.0 FREQUENT UTI: ICD-10-CM

## 2022-07-12 RX ORDER — MELOXICAM 7.5 MG/1
7.5 TABLET ORAL DAILY
Qty: 90 TABLET | Refills: 3 | Status: SHIPPED | OUTPATIENT
Start: 2022-07-12 | End: 2022-07-20 | Stop reason: SDUPTHER

## 2022-07-12 NOTE — TELEPHONE ENCOUNTER
Pierre,  Per Dr Topete, refer to Urology. Order entered.     Mateo,   Spoke to pt regarding Cardiologist situation, she stated Dr Mayfield took over the clinic on [facility]. Dr Farnks only goes to the clinic in [place], which the location is inconvenient.  The cardiologist concerns are for her  Lev Mora(05/07/1940). She is requesting a cardiology referral. They do not want to see Dr Mayfield

## 2022-07-13 ENCOUNTER — OFFICE VISIT (OUTPATIENT)
Dept: ORTHOPEDICS | Facility: CLINIC | Age: 87
End: 2022-07-13
Payer: MEDICARE

## 2022-07-13 ENCOUNTER — HOSPITAL ENCOUNTER (OUTPATIENT)
Dept: RADIOLOGY | Facility: CLINIC | Age: 87
Discharge: HOME | End: 2022-07-13
Attending: REHABILITATION UNIT
Payer: MEDICARE

## 2022-07-13 DIAGNOSIS — M54.2 NECK PAIN: ICD-10-CM

## 2022-07-13 DIAGNOSIS — M25.511 RIGHT SHOULDER PAIN, UNSPECIFIED CHRONICITY: Primary | ICD-10-CM

## 2022-07-13 PROCEDURE — 73030 X-RAY EXAM OF SHOULDER: CPT | Mod: RT,,, | Performed by: REHABILITATION UNIT

## 2022-07-13 PROCEDURE — 1160F PR REVIEW ALL MEDS BY PRESCRIBER/CLIN PHARMACIST DOCUMENTED: ICD-10-PCS | Mod: CPTII,,, | Performed by: REHABILITATION UNIT

## 2022-07-13 PROCEDURE — 1159F PR MEDICATION LIST DOCUMENTED IN MEDICAL RECORD: ICD-10-PCS | Mod: CPTII,,, | Performed by: REHABILITATION UNIT

## 2022-07-13 PROCEDURE — 72040 X-RAY EXAM NECK SPINE 2-3 VW: CPT | Mod: ,,, | Performed by: REHABILITATION UNIT

## 2022-07-13 PROCEDURE — 1159F MED LIST DOCD IN RCRD: CPT | Mod: CPTII,,, | Performed by: REHABILITATION UNIT

## 2022-07-13 PROCEDURE — 99213 OFFICE O/P EST LOW 20 MIN: CPT | Mod: ,,, | Performed by: REHABILITATION UNIT

## 2022-07-13 PROCEDURE — 72040 XR CERVICAL SPINE 2 OR 3 VIEWS: ICD-10-PCS | Mod: ,,, | Performed by: REHABILITATION UNIT

## 2022-07-13 PROCEDURE — 1160F RVW MEDS BY RX/DR IN RCRD: CPT | Mod: CPTII,,, | Performed by: REHABILITATION UNIT

## 2022-07-13 PROCEDURE — 99213 PR OFFICE/OUTPT VISIT, EST, LEVL III, 20-29 MIN: ICD-10-PCS | Mod: ,,, | Performed by: REHABILITATION UNIT

## 2022-07-13 PROCEDURE — 73030 XR SHOULDER COMPLETE 2 OR MORE VIEWS RIGHT: ICD-10-PCS | Mod: RT,,, | Performed by: REHABILITATION UNIT

## 2022-07-13 NOTE — PROGRESS NOTES
Subjective:      Patient ID: Danae Mora is a 86 y.o. female.    Chief Complaint: Follow-up (F/u for right shoulder inj, pt states inj did not help any and went to another doctor and got an inj, pt states pain has moved across shoulder and into neck causing severe headaches, )    HPI:  Danae Mora is a 86F who presents today for follow up cortisone injection in the right shoulder.  She reports no relief with injection.  She had another injection in the right shoulder done by her primary care provider and had no relief with this injection, as well. She reports baseline neuropathy into all of her extremities.  Pain has radiated to the posterior aspect of her shoulder.  Pain is worse with activity and better with rest.  She has not lost any significant range of motion.  She reports back pain and has seen a chiropractor in the past. Her primary care provider has treated her with Tramadol in the past, in which she is not taking. Tramadol did help to decrease her pain.     Past Medical History:   Diagnosis Date    History of degenerative disc disease     Scoliosis      Past Surgical History:   Procedure Laterality Date    HIP SURGERY      right hip replacement    KNEE SURGERY      left knee replacement     Social History     Socioeconomic History    Marital status:    Tobacco Use    Smoking status: Never Smoker    Smokeless tobacco: Never Used         Current Outpatient Medications:     amLODIPine (NORVASC) 5 MG tablet, Take 1 tablet (5 mg total) by mouth 2 (two) times a day., Disp: 60 tablet, Rfl: 3    amoxicillin (AMOXIL) 500 MG capsule, Take 500 mg by mouth 2 (two) times daily., Disp: , Rfl:     chlorpheniramine-pseudoephedrine-acetaminophen (SINUTAB) 2- mg per tablet, Take 500 mg by mouth., Disp: , Rfl:     ciprofloxacin HCl (CIPRO) 250 MG tablet, Take 1 tablet (250 mg total) by mouth every 12 (twelve) hours., Disp: 7 tablet, Rfl: 0    clopidogreL (PLAVIX) 75 mg tablet, Take 75 mg by  mouth., Disp: , Rfl:     DULoxetine (CYMBALTA) 30 MG capsule, Take 1 capsule (30 mg total) by mouth once daily., Disp: 30 capsule, Rfl: 3    hydrOXYchloroQUINE (PLAQUENIL) 200 mg tablet, Take 200 mg by mouth., Disp: , Rfl:     LUMIGAN 0.01 % Drop, Place 1 drop into both eyes 2 (two) times a day., Disp: , Rfl:     megestroL (MEGACE) 400 mg/10 mL (40 mg/mL) Susp, Take 5 mLs (200 mg total) by mouth once daily., Disp: 150 mL, Rfl: 0    meloxicam (MOBIC) 7.5 MG tablet, Take 1 tablet (7.5 mg total) by mouth once daily., Disp: 90 tablet, Rfl: 3    metoprolol succinate (TOPROL-XL) 50 MG 24 hr tablet, Take 50 mg by mouth once daily., Disp: , Rfl:     MYRBETRIQ 50 mg Tb24, Take 1 tablet (50 mg total) by mouth once daily., Disp: 90 tablet, Rfl: 5    pantoprazole (PROTONIX) 40 MG tablet, Take 40 mg by mouth once daily., Disp: , Rfl:   Review of patient's allergies indicates:   Allergen Reactions    Celebrex [celecoxib] Anaphylaxis    Gabapentin     Sulfamethoxazole        There were no vitals taken for this visit.    ROS   Comprehensive review of systems completed and negative except as per HPI.      Objective:    Ortho Exam       Head: Normocephalic, without obvious abnormality, atraumatic  Eyes: conjunctivae/corneas clear. EOM's intact  Ears: normal external appearance  Nose: Nares normal. Septum midline. Mucosa normal. No drainage  Throat: normal findings: lips normal without lesions  Lungs: unlabored breathing on room air  Chest wall: symmetric chest rise  Heart: regular rate and rhythm  Pulses: 2+ and symmetric  Skin: Skin color, texture, turgor normal. No rashes or lesions  Neurologic: Grossly normal    Right SHOULDER    Appearance:   Atrophy in the supraspinatus and infraspinatus fossas     Cervical Spine:   Mild ttp; decreased ROM with pain; negative Spurlings    Tenderness:   anterior    AROM:   FF full, Abduction full, ER full, IR full    PROM:  same    Pain:  AROM: positive at the extremes   PROM:   negative  End ROM: positive  Supraspinatus strength testing: negative  External rotation strength testing: negative  Lucía-scapular: negative  Virtually all provacative maneuvers negative    Strength:  Supraspinatus: intact  External rotation: intact  Lucía-scapular: intact    Provocative Maneuvers:     Rotator Cuff/Biceps/AC Joint  Neer's Sign: positive  Hawkin's Test: negative  Painful arc: negative  Belly Press: negative  Bear Hugger Test: negative  Hornblower's Sign: negative  Speed's Test: negative  Yergeson's Test: negative  Cross Arm Abduction: positive    Pulses: Palpable radial pulse    Neurological deficits: None    The patient has a warm and well-perfused upper extremity with capillary refill less than 2 seconds. Sensation is intact to light touch in terminal nerve distributions. The patient has no palpable epitrochlear lymphadenopathy.      Assessment:     Imaging:   Two-view radiographs of the cervical spine obtained today personally reviewed showing no acute fractures or dislocations.  There is advanced degenerative changes throughout.  Anterolisthesis of C4 on 5.  Loss of disc space and facet arthropathy.    XR R shoulder obtained today show no acute fx or d/l. Mild AC arthritis. Stable from prior films.     X-ray Shoulder 2 or More Views Right  Narrative: EXAMINATION:  XR SHOULDER COMPLETE 2 OR MORE VIEWS RIGHT    CLINICAL HISTORY:  pain;Pain in right shoulder    COMPARISON:  None available    FINDINGS:  Mild degenerative changes involve the right acromioclavicular joint.  Glenohumeral articulation appears to be unremarkable.  No acute fracture or dislocation identified.  Impression: Acromioclavicular mild degenerative osteoarthritic changes.    Electronically signed by: Daniel Saxena  Date:    05/05/2022  Time:    11:05          1. Right shoulder pain, unspecified chronicity    2. Neck pain          Plan:       Orders Placed This Encounter    X-ray Shoulder 2 or More Views Right    X-Ray Cervical  Spine 2 or 3 Views     Imaging and exam findings discussed with the patient.  She continues to have atraumatic right shoulder pain for several months.  She has overall good range of motion and good strength of her rotator cuff.  She does have some degenerative changes within the shoulder and some signs of impingement. The pain she is currently experiencing seems to be stemming from her neck. Upon Xrays of her C-spine she does have significant degenerative changes.  We discussed her options and will have her follow up with Dr Mar for further evaluation of her cervical spine. All of her questions were answered and she was in agreement with the plan.

## 2022-07-14 NOTE — TELEPHONE ENCOUNTER
Patient would prefer to not have to see another specialist and is asking if Dr. Topete can recommend a different medication, if it does not help then she is okay with going back to something that slightly helps   Please Advise

## 2022-07-19 ENCOUNTER — TELEPHONE (OUTPATIENT)
Dept: INTERNAL MEDICINE | Facility: CLINIC | Age: 87
End: 2022-07-19
Payer: MEDICARE

## 2022-07-19 DIAGNOSIS — M06.9 RHEUMATOID ARTHRITIS INVOLVING MULTIPLE SITES, UNSPECIFIED WHETHER RHEUMATOID FACTOR PRESENT: ICD-10-CM

## 2022-07-19 NOTE — TELEPHONE ENCOUNTER
Spoke to pt and she agreed to move forward with Urology Referral. She requested one close to her home (near our clinic/hospital)

## 2022-07-20 RX ORDER — MELOXICAM 7.5 MG/1
7.5 TABLET ORAL DAILY
Qty: 90 TABLET | Refills: 3 | Status: SHIPPED | OUTPATIENT
Start: 2022-07-20 | End: 2023-06-22

## 2022-07-20 NOTE — TELEPHONE ENCOUNTER
Contacted [facility], new Rx sent to pharmacy   Note made on their end OK to fill and continue Plavix

## 2022-08-05 ENCOUNTER — DOCUMENTATION ONLY (OUTPATIENT)
Dept: INTERNAL MEDICINE | Facility: CLINIC | Age: 87
End: 2022-08-05
Payer: MEDICARE

## 2022-09-16 ENCOUNTER — HISTORICAL (OUTPATIENT)
Dept: ADMINISTRATIVE | Facility: HOSPITAL | Age: 87
End: 2022-09-16
Payer: MEDICARE

## 2022-09-16 DIAGNOSIS — I48.91 ATRIAL FIBRILLATION, UNSPECIFIED TYPE: Primary | ICD-10-CM

## 2022-09-16 RX ORDER — CLOPIDOGREL BISULFATE 75 MG/1
75 TABLET ORAL DAILY
Qty: 90 TABLET | Refills: 3 | Status: SHIPPED | OUTPATIENT
Start: 2022-09-16 | End: 2023-06-22

## 2022-10-07 ENCOUNTER — TELEPHONE (OUTPATIENT)
Dept: INTERNAL MEDICINE | Facility: CLINIC | Age: 87
End: 2022-10-07
Payer: MEDICARE

## 2022-10-07 NOTE — TELEPHONE ENCOUNTER
Records reviewed per Dr Topete and advised CT Abdomen small polyp in gallbladder but no surgery recommended. PT made aware and advised to reach out to Urologist office for further recommendation regarding urinary issues. She verbalized understanding

## 2022-10-17 ENCOUNTER — DOCUMENTATION ONLY (OUTPATIENT)
Dept: INTERNAL MEDICINE | Facility: CLINIC | Age: 87
End: 2022-10-17
Payer: MEDICARE

## 2022-11-04 DIAGNOSIS — N39.0 FREQUENT UTI: Primary | ICD-10-CM

## 2022-11-04 NOTE — TELEPHONE ENCOUNTER
"Patient called stating "I went see the Urologist yesterday and was advised that I do not have any bladder issues, the symptoms I am experiencing is due to Hormone Imbalance.  I wanted to tell Dr. Topete what they said and see if he has any recommendations".    "

## 2022-11-08 RX ORDER — CONJUGATED ESTROGENS 0.62 MG/G
1 CREAM VAGINAL
Qty: 30 G | Refills: 2 | Status: SHIPPED | OUTPATIENT
Start: 2022-11-08 | End: 2022-11-09 | Stop reason: SDUPTHER

## 2022-11-08 NOTE — TELEPHONE ENCOUNTER
Tried to contact pt to check on her and make her aware of Rx sent to pharmacy    No Answer/Left VM, Advised her to contact office if she needs anything additional or has additional concerns

## 2022-11-09 DIAGNOSIS — N32.81 OAB (OVERACTIVE BLADDER): ICD-10-CM

## 2022-11-09 DIAGNOSIS — N39.0 FREQUENT UTI: ICD-10-CM

## 2022-11-09 RX ORDER — CONJUGATED ESTROGENS 0.62 MG/G
1 CREAM VAGINAL
Qty: 30 G | Refills: 2 | Status: SHIPPED | OUTPATIENT
Start: 2022-11-10 | End: 2023-06-22

## 2022-12-12 DIAGNOSIS — M06.9 RHEUMATOID ARTHRITIS INVOLVING MULTIPLE SITES, UNSPECIFIED WHETHER RHEUMATOID FACTOR PRESENT: Primary | ICD-10-CM

## 2022-12-12 RX ORDER — HYDROXYCHLOROQUINE SULFATE 200 MG/1
200 TABLET, FILM COATED ORAL DAILY
Qty: 30 TABLET | Refills: 5 | Status: SHIPPED | OUTPATIENT
Start: 2022-12-12 | End: 2023-01-11 | Stop reason: SDUPTHER

## 2023-01-11 DIAGNOSIS — M06.9 RHEUMATOID ARTHRITIS INVOLVING MULTIPLE SITES, UNSPECIFIED WHETHER RHEUMATOID FACTOR PRESENT: ICD-10-CM

## 2023-01-11 RX ORDER — HYDROXYCHLOROQUINE SULFATE 200 MG/1
200 TABLET, FILM COATED ORAL DAILY
Qty: 90 TABLET | Refills: 3 | Status: SHIPPED | OUTPATIENT
Start: 2023-01-11 | End: 2024-03-25 | Stop reason: SDUPTHER

## 2023-03-15 DIAGNOSIS — I10 PRIMARY HYPERTENSION: Primary | ICD-10-CM

## 2023-03-15 RX ORDER — AMLODIPINE BESYLATE 5 MG/1
5 TABLET ORAL 2 TIMES DAILY
Qty: 180 TABLET | Refills: 3 | Status: SHIPPED | OUTPATIENT
Start: 2023-03-15

## 2023-04-06 ENCOUNTER — LAB REQUISITION (OUTPATIENT)
Dept: LAB | Facility: HOSPITAL | Age: 88
End: 2023-04-06
Payer: MEDICARE

## 2023-04-06 DIAGNOSIS — N39.0 URINARY TRACT INFECTION, SITE NOT SPECIFIED: ICD-10-CM

## 2023-04-06 PROCEDURE — 87088 URINE BACTERIA CULTURE: CPT | Performed by: NURSE PRACTITIONER

## 2023-04-08 LAB — BACTERIA UR CULT: NO GROWTH

## 2023-04-20 DIAGNOSIS — I10 PRIMARY HYPERTENSION: Primary | ICD-10-CM

## 2023-04-20 RX ORDER — METOPROLOL SUCCINATE 50 MG/1
50 TABLET, EXTENDED RELEASE ORAL DAILY
Qty: 90 TABLET | Refills: 3 | Status: SHIPPED | OUTPATIENT
Start: 2023-04-20

## 2023-06-22 ENCOUNTER — OFFICE VISIT (OUTPATIENT)
Dept: INTERNAL MEDICINE | Facility: CLINIC | Age: 88
End: 2023-06-22
Payer: MEDICARE

## 2023-06-22 VITALS
SYSTOLIC BLOOD PRESSURE: 138 MMHG | HEART RATE: 81 BPM | WEIGHT: 105 LBS | DIASTOLIC BLOOD PRESSURE: 78 MMHG | RESPIRATION RATE: 16 BRPM | BODY MASS INDEX: 19.83 KG/M2 | HEIGHT: 61 IN | OXYGEN SATURATION: 98 %

## 2023-06-22 DIAGNOSIS — N39.0 FREQUENT UTI: ICD-10-CM

## 2023-06-22 DIAGNOSIS — Z00.00 ANNUAL PHYSICAL EXAM: ICD-10-CM

## 2023-06-22 DIAGNOSIS — H40.9 GLAUCOMA, UNSPECIFIED GLAUCOMA TYPE, UNSPECIFIED LATERALITY: ICD-10-CM

## 2023-06-22 DIAGNOSIS — M06.9 RHEUMATOID ARTHRITIS INVOLVING MULTIPLE SITES, UNSPECIFIED WHETHER RHEUMATOID FACTOR PRESENT: ICD-10-CM

## 2023-06-22 DIAGNOSIS — I10 PRIMARY HYPERTENSION: ICD-10-CM

## 2023-06-22 PROCEDURE — 3078F DIAST BP <80 MM HG: CPT | Mod: CPTII,,, | Performed by: INTERNAL MEDICINE

## 2023-06-22 PROCEDURE — 3078F PR MOST RECENT DIASTOLIC BLOOD PRESSURE < 80 MM HG: ICD-10-PCS | Mod: CPTII,,, | Performed by: INTERNAL MEDICINE

## 2023-06-22 PROCEDURE — 1160F RVW MEDS BY RX/DR IN RCRD: CPT | Mod: CPTII,,, | Performed by: INTERNAL MEDICINE

## 2023-06-22 PROCEDURE — 3288F PR FALLS RISK ASSESSMENT DOCUMENTED: ICD-10-PCS | Mod: CPTII,,, | Performed by: INTERNAL MEDICINE

## 2023-06-22 PROCEDURE — 99213 OFFICE O/P EST LOW 20 MIN: CPT | Mod: 25,,, | Performed by: INTERNAL MEDICINE

## 2023-06-22 PROCEDURE — 3075F SYST BP GE 130 - 139MM HG: CPT | Mod: CPTII,,, | Performed by: INTERNAL MEDICINE

## 2023-06-22 PROCEDURE — 3075F PR MOST RECENT SYSTOLIC BLOOD PRESS GE 130-139MM HG: ICD-10-PCS | Mod: CPTII,,, | Performed by: INTERNAL MEDICINE

## 2023-06-22 PROCEDURE — G0439 PPPS, SUBSEQ VISIT: HCPCS | Mod: ,,, | Performed by: INTERNAL MEDICINE

## 2023-06-22 PROCEDURE — 3288F FALL RISK ASSESSMENT DOCD: CPT | Mod: CPTII,,, | Performed by: INTERNAL MEDICINE

## 2023-06-22 PROCEDURE — 1159F PR MEDICATION LIST DOCUMENTED IN MEDICAL RECORD: ICD-10-PCS | Mod: CPTII,,, | Performed by: INTERNAL MEDICINE

## 2023-06-22 PROCEDURE — 1160F PR REVIEW ALL MEDS BY PRESCRIBER/CLIN PHARMACIST DOCUMENTED: ICD-10-PCS | Mod: CPTII,,, | Performed by: INTERNAL MEDICINE

## 2023-06-22 PROCEDURE — 1159F MED LIST DOCD IN RCRD: CPT | Mod: CPTII,,, | Performed by: INTERNAL MEDICINE

## 2023-06-22 PROCEDURE — 99213 PR OFFICE/OUTPT VISIT, EST, LEVL III, 20-29 MIN: ICD-10-PCS | Mod: 25,,, | Performed by: INTERNAL MEDICINE

## 2023-06-22 PROCEDURE — G0439 PR MEDICARE ANNUAL WELLNESS SUBSEQUENT VISIT: ICD-10-PCS | Mod: ,,, | Performed by: INTERNAL MEDICINE

## 2023-06-22 PROCEDURE — 1101F PT FALLS ASSESS-DOCD LE1/YR: CPT | Mod: CPTII,,, | Performed by: INTERNAL MEDICINE

## 2023-06-22 PROCEDURE — 1101F PR PT FALLS ASSESS DOC 0-1 FALLS W/OUT INJ PAST YR: ICD-10-PCS | Mod: CPTII,,, | Performed by: INTERNAL MEDICINE

## 2023-06-22 RX ORDER — DICLOFENAC SODIUM 75 MG/1
75 TABLET, DELAYED RELEASE ORAL 2 TIMES DAILY
Qty: 30 TABLET | Refills: 2 | Status: SHIPPED | OUTPATIENT
Start: 2023-06-22 | End: 2023-08-08 | Stop reason: SDUPTHER

## 2023-06-22 NOTE — PROGRESS NOTES
Patient ID: Danae Mora is a 87 y.o. female.    Chief Complaint: Medicare AWV      Patient Care Team:  Jacoby Topete MD as PCP - General (Internal Medicine)     HPI:   Patient presents here today for above reason.   The patient's Health Maintenance was reviewed and the following appears to be due at this time:   Health Maintenance Due   Topic Date Due    TETANUS VACCINE  Never done    DEXA Scan  Never done    Shingles Vaccine (1 of 2) Never done    Pneumococcal Vaccines (Age 65+) (1 - PCV) Never done    COVID-19 Vaccine (4 - Pfizer series) 01/11/2022        Past Medical History:  Past Medical History:   Diagnosis Date    History of degenerative disc disease     Scoliosis      Past Surgical History:   Procedure Laterality Date    HIP SURGERY      right hip replacement    KNEE SURGERY      left knee replacement     Review of patient's allergies indicates:   Allergen Reactions    Celebrex [celecoxib] Anaphylaxis    Gabapentin     Sulfamethoxazole      Current Outpatient Medications on File Prior to Visit   Medication Sig Dispense Refill    amLODIPine (NORVASC) 5 MG tablet Take 1 tablet (5 mg total) by mouth 2 (two) times a day. 180 tablet 3    hydrOXYchloroQUINE (PLAQUENIL) 200 mg tablet Take 1 tablet (200 mg total) by mouth once daily. 90 tablet 3    LUMIGAN 0.01 % Drop Place 1 drop into both eyes 2 (two) times a day.      megestroL (MEGACE) 400 mg/10 mL (40 mg/mL) Susp Take 5 mLs (200 mg total) by mouth once daily. 150 mL 0    meloxicam (MOBIC) 7.5 MG tablet Take 1 tablet (7.5 mg total) by mouth once daily. 90 tablet 3    metoprolol succinate (TOPROL-XL) 50 MG 24 hr tablet Take 1 tablet (50 mg total) by mouth once daily. 90 tablet 3    MYRBETRIQ 50 mg Tb24 Take 1 tablet (50 mg total) by mouth once daily. 90 tablet 5    pantoprazole (PROTONIX) 40 MG tablet Take 40 mg by mouth once daily.      amoxicillin (AMOXIL) 500 MG capsule Take 500 mg by mouth 2 (two) times daily.       chlorpheniramine-pseudoephedrine-acetaminophen (SINUTAB) 2- mg per tablet Take 500 mg by mouth.      [DISCONTINUED] ciprofloxacin HCl (CIPRO) 250 MG tablet Take 1 tablet (250 mg total) by mouth every 12 (twelve) hours. (Patient not taking: Reported on 6/22/2023) 7 tablet 0    [DISCONTINUED] clopidogreL (PLAVIX) 75 mg tablet Take 1 tablet (75 mg total) by mouth once daily. (Patient not taking: Reported on 6/22/2023) 90 tablet 3    [DISCONTINUED] conjugated estrogens (PREMARIN) vaginal cream Place 1 g vaginally twice a week. At bedtime (Patient not taking: Reported on 6/22/2023) 30 g 2    [DISCONTINUED] DULoxetine (CYMBALTA) 30 MG capsule Take 1 capsule (30 mg total) by mouth once daily. (Patient not taking: Reported on 6/22/2023) 30 capsule 3     No current facility-administered medications on file prior to visit.     Social History     Socioeconomic History    Marital status:    Tobacco Use    Smoking status: Never    Smokeless tobacco: Never     History reviewed. No pertinent family history.        ROS:   Review of Systems  Constitutional: No weight gain, No fever, No chills, No fatigue.   Eyes: No blurring, No visual disturbances.   Ear/Nose/Mouth/Throat: No decreased hearing, No ear pain, No nasal congestion, No sore throat.   Respiratory: No shortness of breath, No cough, No wheezing.   Cardiovascular: No chest pain, No palpitations, No peripheral edema.   Gastrointestinal: No nausea, No vomiting, No diarrhea, No constipation, No abdominal pain.   Genitourinary: No dysuria, No hematuria.  Refers difficulty voiding, urinary retention  Hematology/Lymphatics: No bruising tendency, No bleeding tendency, No swollen lymph glands.   Endocrine: No excessive thirst, No polyuria, No excessive hunger.   Musculoskeletal: No joint pain, No muscle pain, No decreased range of motion.   Integumentary: No rash, No pruritus.   Neurologic: No abnormal balance, No confusion, No headache.   Psychiatric: No anxiety, No  "depression, Not suicidal, No hallucinations.          Patient Reported Health Risk Assessment       Vitals/PE:   /78 (BP Location: Left arm, Patient Position: Sitting)   Pulse 81   Resp 16   Ht 5' 0.98" (1.549 m)   Wt 47.6 kg (105 lb)   SpO2 98%   BMI 19.85 kg/m²   Physical Exam    General: Alert and oriented, No acute distress.   Eye: Normal conjunctiva without exudate.  HENMT: Normocephalic/AT, Normal hearing, Oral mucosa is moist and pink   Neck: No goiter visualized.   Respiratory: Lungs CTAB, Respirations are non-labored, Breath sounds are equal, Symmetrical chest wall expansion.  Cardiovascular: Normal rate, Regular rhythm, No murmur, No edema.   Gastrointestinal: Non-distended.   Genitourinary: Deferred.  Musculoskeletal: Normal ROM, Normal gait, No deformities or amputations.  Integumentary: Warm, Dry, Intact. No diaphoresis, or flushing.  Neurologic: No focal deficits, Cranial Nerves II-XII are grossly intact.   Psychiatric: Cooperative, Appropriate mood & affect, Normal judgment, Non-suicidal.      No flowsheet data found.  Fall Risk Assessment - Outpatient 6/22/2023 6/8/2022 5/3/2022   Mobility Status Ambulatory Ambulatory Ambulatory   Number of falls 0 0 0   Identified as fall risk 0 0 1                Assessment/Plan:    ..  Problem List Items Addressed This Visit          Ophtho    Glaucoma - Primary       Cardiac/Vascular    Hypertension       Renal/    Frequent UTI       Immunology/Multi System    Rheumatoid arthritis      Recommendations:   Diet (healthy food choices, reduce portions and overall calorie intake)  Exercise 30-45 minutes 3x per week  Avoid excessive alcohol and tobacco  Stay UTD with immunizations and preventative exams  Monthly self breast exams   Yearly Labs     ..  Prescription for her bladder is submitted to her office, a prescription for Voltaren by mouth to take once a day      ..No orders of the defined types were placed in this encounter.        I have " discontinued Danae Mora's DULoxetine, ciprofloxacin HCl, clopidogreL, and PREMARIN. I am also having her maintain her chlorpheniramine-pseudoephedrine-acetaminophen, amoxicillin, LUMIGAN, pantoprazole, megestroL, MYRBETRIQ, meloxicam, hydrOXYchloroQUINE, amLODIPine, and metoprolol succinate.    No orders of the defined types were placed in this encounter.      Education and counseling done face to face regarding medical conditions and plan. Contact office if new symptoms develop. Should any symptoms ever significantly worsen seek emergency medical attention/go to ER. Follow up at least yearly for wellness or sooner PRN. Nurse to call patient with any results. The patient is receptive, expresses understanding and is agreeable to plan. All questions have been answered.        Advance Care Planning   I attest to discussing Advance Care Planning with patient and/or family member.  Education was provided including the importance of the Health Care Power of , Advance Directives, and/or [place] documentation.  The patient expressed understanding to the importance of this information and discussion.         No follow-ups on file.

## 2023-06-27 ENCOUNTER — TELEPHONE (OUTPATIENT)
Dept: INTERNAL MEDICINE | Facility: CLINIC | Age: 88
End: 2023-06-27
Payer: MEDICARE

## 2023-06-27 NOTE — TELEPHONE ENCOUNTER
Advised per Dr Topete, Yes, it is ok to take with Dx of Glaucoma. Rx was sent to pharmacy on 6/22 during OV

## 2023-06-27 NOTE — TELEPHONE ENCOUNTER
----- Message from Dionna Carey sent at 6/27/2023  9:46 AM CDT -----  .Type:  Needs Medical Advice    Who Called: pt    Pharmacy name and phone #:  [id number] - [place] - [address]   Would the patient rather a call back or a response via [facility]? Call back   Best Call Back Number: [phone] or [phone]  Additional Information: pt needs to know if the Gemtesa is good for her glaucoma

## 2023-06-27 NOTE — TELEPHONE ENCOUNTER
Spoke to patient, she advised me that it has her eyes screwed up and made her sick.  She has an eye doctor appt tomorrow with Dr. Anne Paulino  Body and eyes are so dry

## 2023-06-29 ENCOUNTER — OFFICE VISIT (OUTPATIENT)
Dept: INTERNAL MEDICINE | Facility: CLINIC | Age: 88
End: 2023-06-29
Payer: MEDICARE

## 2023-06-29 VITALS
WEIGHT: 107 LBS | OXYGEN SATURATION: 98 % | HEIGHT: 60 IN | SYSTOLIC BLOOD PRESSURE: 132 MMHG | DIASTOLIC BLOOD PRESSURE: 62 MMHG | HEART RATE: 71 BPM | BODY MASS INDEX: 21.01 KG/M2

## 2023-06-29 DIAGNOSIS — J01.01 ACUTE RECURRENT MAXILLARY SINUSITIS: ICD-10-CM

## 2023-06-29 DIAGNOSIS — I10 PRIMARY HYPERTENSION: ICD-10-CM

## 2023-06-29 DIAGNOSIS — H40.9 GLAUCOMA, UNSPECIFIED GLAUCOMA TYPE, UNSPECIFIED LATERALITY: Primary | ICD-10-CM

## 2023-06-29 PROCEDURE — 1159F PR MEDICATION LIST DOCUMENTED IN MEDICAL RECORD: ICD-10-PCS | Mod: CPTII,,, | Performed by: INTERNAL MEDICINE

## 2023-06-29 PROCEDURE — 3075F PR MOST RECENT SYSTOLIC BLOOD PRESS GE 130-139MM HG: ICD-10-PCS | Mod: CPTII,,, | Performed by: INTERNAL MEDICINE

## 2023-06-29 PROCEDURE — 99214 OFFICE O/P EST MOD 30 MIN: CPT | Mod: 25,,, | Performed by: INTERNAL MEDICINE

## 2023-06-29 PROCEDURE — 96372 THER/PROPH/DIAG INJ SC/IM: CPT | Mod: ,,, | Performed by: INTERNAL MEDICINE

## 2023-06-29 PROCEDURE — 3078F PR MOST RECENT DIASTOLIC BLOOD PRESSURE < 80 MM HG: ICD-10-PCS | Mod: CPTII,,, | Performed by: INTERNAL MEDICINE

## 2023-06-29 PROCEDURE — 3075F SYST BP GE 130 - 139MM HG: CPT | Mod: CPTII,,, | Performed by: INTERNAL MEDICINE

## 2023-06-29 PROCEDURE — 3078F DIAST BP <80 MM HG: CPT | Mod: CPTII,,, | Performed by: INTERNAL MEDICINE

## 2023-06-29 PROCEDURE — 1159F MED LIST DOCD IN RCRD: CPT | Mod: CPTII,,, | Performed by: INTERNAL MEDICINE

## 2023-06-29 PROCEDURE — 99214 PR OFFICE/OUTPT VISIT, EST, LEVL IV, 30-39 MIN: ICD-10-PCS | Mod: 25,,, | Performed by: INTERNAL MEDICINE

## 2023-06-29 PROCEDURE — 1160F PR REVIEW ALL MEDS BY PRESCRIBER/CLIN PHARMACIST DOCUMENTED: ICD-10-PCS | Mod: CPTII,,, | Performed by: INTERNAL MEDICINE

## 2023-06-29 PROCEDURE — 1160F RVW MEDS BY RX/DR IN RCRD: CPT | Mod: CPTII,,, | Performed by: INTERNAL MEDICINE

## 2023-06-29 PROCEDURE — 96372 PR INJECTION,THERAP/PROPH/DIAG2ST, IM OR SUBCUT: ICD-10-PCS | Mod: ,,, | Performed by: INTERNAL MEDICINE

## 2023-06-29 RX ORDER — CEFTRIAXONE 1 G/1
1 INJECTION, POWDER, FOR SOLUTION INTRAMUSCULAR; INTRAVENOUS
Status: COMPLETED | OUTPATIENT
Start: 2023-06-29 | End: 2023-06-29

## 2023-06-29 RX ADMIN — CEFTRIAXONE 1 G: 1 INJECTION, POWDER, FOR SOLUTION INTRAMUSCULAR; INTRAVENOUS at 11:06

## 2023-06-29 NOTE — PROGRESS NOTES
Subjective:       Patient ID: Danae Mora is a 87 y.o. female.    Chief Complaint: Tremors (hands)    HPI   87-year-old female with upper respiratory tract symptoms, intractable cough, apparently low-grade fever.  I provided with samples of right vent, still with symptoms.  Patient concerned about drug interaction for her glaucoma, reassurance given safe.  She is also on diclofenac for arthritis   Patient advised to take vitamin-C every day, increase fluid intake, and she will receive a shot of Rocephin IM here at the clinic.  I will provide her with samples of Zyrtec to take once a day.  Review of Systems    Pertinent for cyanosis symptoms, postnasal drip, intractable cough with low-grade fever   No recent falls   No chest pain  Mild shortness of breath when coughing   Denies dysuria urgency frequency   Diffuse arthralgias  Objective:      Physical Exam     Alert oriented x3   HEENT posterior pharynx erythema is noted with thick drip  Neck is supple   Heart faint murmur 2/6   Lungs essentially clear on anterior posterior approach with no wheezing rales or rhonchi   Abdomen benign   Extremities arthritic changes no edema  Assessment:       1. Acute recurrent maxillary sinusitis    2. Glaucoma, unspecified glaucoma type, unspecified laterality    3. Primary hypertension      Plan:       Add Vit C  ,  1 gram Rocephin  IM in clinic , increase fluids , zyrtec daily   Stable  continue  same drops   Stable  well controlled   RTC  as chedule

## 2023-08-08 DIAGNOSIS — M06.9 RHEUMATOID ARTHRITIS INVOLVING MULTIPLE SITES, UNSPECIFIED WHETHER RHEUMATOID FACTOR PRESENT: Primary | ICD-10-CM

## 2023-08-08 RX ORDER — DICLOFENAC SODIUM 75 MG/1
75 TABLET, DELAYED RELEASE ORAL 2 TIMES DAILY
Qty: 30 TABLET | Refills: 2 | Status: SHIPPED | OUTPATIENT
Start: 2023-08-08 | End: 2023-08-10 | Stop reason: SDUPTHER

## 2023-08-08 NOTE — TELEPHONE ENCOUNTER
----- Message from Sona Dickey sent at 8/8/2023  9:04 AM CDT -----  .Type:  RX Refill Request    Who Called: pt  Refill or New Rx:refill  RX Name and Strength:diclofenac (VOLTAREN) 75 MG EC tablet  How is the patient currently taking it? (ex. 1XDay):2x day  Is this a 30 day or 90 day RX:30  [facility]  Local or Mail Order:local  Ordering Provider:Efrem  Would the patient rather a call back or a response via [facility]?   Best Call Back Number:[phone]  Additional Information: needs refill Pierre she said you're her fletcher

## 2023-08-10 ENCOUNTER — TELEPHONE (OUTPATIENT)
Dept: INTERNAL MEDICINE | Facility: CLINIC | Age: 88
End: 2023-08-10
Payer: MEDICARE

## 2023-08-10 DIAGNOSIS — M06.9 RHEUMATOID ARTHRITIS INVOLVING MULTIPLE SITES, UNSPECIFIED WHETHER RHEUMATOID FACTOR PRESENT: ICD-10-CM

## 2023-08-10 RX ORDER — DICLOFENAC SODIUM 75 MG/1
75 TABLET, DELAYED RELEASE ORAL 2 TIMES DAILY
Qty: 30 TABLET | Refills: 2 | Status: SHIPPED | OUTPATIENT
Start: 2023-08-10 | End: 2023-10-16 | Stop reason: ALTCHOICE

## 2023-08-10 NOTE — TELEPHONE ENCOUNTER
----- Message from Sona Dickey sent at 8/10/2023 11:44 AM CDT -----  .Type:  RX Refill Request    Who Called: pt  Refill or New Rx:refill  RX Name and Strength:diclofenac (VOLTAREN) 75 MG EC tablet  How is the patient currently taking it? (ex. 1XDay):2x day  Is this a 30 day or 90 day RX:30  Preferred Pharmacy with phone number:[facility] [phone]  Local or Mail Order:local  Ordering Provider:[Efrem  Would the patient rather a call back or a response via [facility]?   Best Call Back Number:[phone]  Additional Information: needs refill sent to new pharmacy see above

## 2023-08-14 ENCOUNTER — TELEPHONE (OUTPATIENT)
Dept: INTERNAL MEDICINE | Facility: CLINIC | Age: 88
End: 2023-08-14
Payer: MEDICARE

## 2023-08-14 DIAGNOSIS — M06.9 RHEUMATOID ARTHRITIS INVOLVING MULTIPLE SITES, UNSPECIFIED WHETHER RHEUMATOID FACTOR PRESENT: Primary | ICD-10-CM

## 2023-08-14 RX ORDER — DICLOFENAC SODIUM 10 MG/G
2 GEL TOPICAL DAILY
Qty: 50 G | Refills: 1 | Status: SHIPPED | OUTPATIENT
Start: 2023-08-14 | End: 2023-10-16 | Stop reason: ALTCHOICE

## 2023-08-14 RX ORDER — DICLOFENAC SODIUM 10 MG/G
2 GEL TOPICAL DAILY
COMMUNITY
End: 2023-08-14 | Stop reason: SDUPTHER

## 2023-08-14 NOTE — TELEPHONE ENCOUNTER
----- Message from Freda Anguiano sent at 8/14/2023  9:47 AM CDT -----  Regarding: FW: meds  Yes I understand but pt is requesting cream not the tablets.  ----- Message -----  From: Pierre Acevedo MA  Sent: 8/14/2023   9:40 AM CDT  To: Freda Anguiano  Subject: RE: meds                                         Diclofenac 75 mg Tabs sent to [facility] on 08/10/23 #30 with 2 refills   ----- Message -----  From: rFeda Anguiano  Sent: 8/14/2023   9:14 AM CDT  To: Efrem GUERIN Staff  Subject: meds                                             .Type:  RX Refill Request    Who Called: Pt  Refill or New Rx:Refill  RX Name and Strength:diclofenac (VOLTAREN) Cream  How is the patient currently taking it? (ex. 1XDay):  Is this a 30 day or 90 day RX:  Preferred Pharmacy with phone number:[facility]; [address]  Local or Mail Order:Local  Ordering Provider:Efrem  Would the patient rather a call back or a response via [facility]? Call back  Best Call Back Number:[phone]  Additional Information: States the tablets rather than the cream was called in.

## 2023-08-14 NOTE — TELEPHONE ENCOUNTER
----- Message from Freda Anguiano sent at 8/14/2023  9:08 AM CDT -----  Regarding: meds  .Type:  RX Refill Request    Who Called: Pt  Refill or New Rx:Refill  RX Name and Strength:diclofenac (VOLTAREN) Cream  How is the patient currently taking it? (ex. 1XDay):  Is this a 30 day or 90 day RX:  Preferred Pharmacy with phone number:[facility]; [address]  Local or Mail Order:Local  Ordering Provider:Efrem  Would the patient rather a call back or a response via [facility]? Call back  Best Call Back Number:[phone]  Additional Information: States the tablets rather than the cream was called in.

## 2023-10-16 ENCOUNTER — TELEPHONE (OUTPATIENT)
Dept: INTERNAL MEDICINE | Facility: CLINIC | Age: 88
End: 2023-10-16

## 2023-10-16 ENCOUNTER — OFFICE VISIT (OUTPATIENT)
Dept: INTERNAL MEDICINE | Facility: CLINIC | Age: 88
End: 2023-10-16
Payer: MEDICARE

## 2023-10-16 VITALS
HEART RATE: 69 BPM | WEIGHT: 109 LBS | HEIGHT: 60 IN | SYSTOLIC BLOOD PRESSURE: 130 MMHG | DIASTOLIC BLOOD PRESSURE: 76 MMHG | BODY MASS INDEX: 21.4 KG/M2 | OXYGEN SATURATION: 99 % | RESPIRATION RATE: 18 BRPM

## 2023-10-16 DIAGNOSIS — G89.29 CHRONIC PAIN OF BOTH SHOULDERS: ICD-10-CM

## 2023-10-16 DIAGNOSIS — R06.02 SHORTNESS OF BREATH: Primary | ICD-10-CM

## 2023-10-16 DIAGNOSIS — G62.9 PERIPHERAL POLYNEUROPATHY: ICD-10-CM

## 2023-10-16 DIAGNOSIS — R52 GENERALIZED BODY ACHES: ICD-10-CM

## 2023-10-16 DIAGNOSIS — M54.2 CERVICALGIA: ICD-10-CM

## 2023-10-16 DIAGNOSIS — M79.642 BILATERAL HAND PAIN: ICD-10-CM

## 2023-10-16 DIAGNOSIS — M25.512 CHRONIC PAIN OF BOTH SHOULDERS: ICD-10-CM

## 2023-10-16 DIAGNOSIS — Z13.1 SCREENING FOR DIABETES MELLITUS: ICD-10-CM

## 2023-10-16 DIAGNOSIS — N39.498 OTHER URINARY INCONTINENCE: ICD-10-CM

## 2023-10-16 DIAGNOSIS — I10 PRIMARY HYPERTENSION: ICD-10-CM

## 2023-10-16 DIAGNOSIS — R60.0 BILATERAL LOWER EXTREMITY EDEMA: ICD-10-CM

## 2023-10-16 DIAGNOSIS — M79.641 BILATERAL HAND PAIN: ICD-10-CM

## 2023-10-16 DIAGNOSIS — E74.89 OTHER SPECIFIED DISORDERS OF CARBOHYDRATE METABOLISM: ICD-10-CM

## 2023-10-16 DIAGNOSIS — M25.511 CHRONIC PAIN OF BOTH SHOULDERS: ICD-10-CM

## 2023-10-16 DIAGNOSIS — R26.89 IMBALANCE: ICD-10-CM

## 2023-10-16 LAB
ALBUMIN SERPL-MCNC: 4 G/DL (ref 3.4–4.8)
ALBUMIN/GLOB SERPL: 1.3 RATIO (ref 1.1–2)
ALP SERPL-CCNC: 74 UNIT/L (ref 40–150)
ALT SERPL-CCNC: 10 UNIT/L (ref 0–55)
AST SERPL-CCNC: 17 UNIT/L (ref 5–34)
BASOPHILS # BLD AUTO: 0.05 X10(3)/MCL
BASOPHILS NFR BLD AUTO: 0.8 %
BILIRUB SERPL-MCNC: 0.7 MG/DL
BNP BLD-MCNC: 67.1 PG/ML
BUN SERPL-MCNC: 18.7 MG/DL (ref 9.8–20.1)
CALCIUM SERPL-MCNC: 9.7 MG/DL (ref 8.4–10.2)
CHLORIDE SERPL-SCNC: 106 MMOL/L (ref 98–107)
CO2 SERPL-SCNC: 25 MMOL/L (ref 23–31)
CREAT SERPL-MCNC: 0.95 MG/DL (ref 0.55–1.02)
EOSINOPHIL # BLD AUTO: 0.06 X10(3)/MCL (ref 0–0.9)
EOSINOPHIL NFR BLD AUTO: 1 %
ERYTHROCYTE [DISTWIDTH] IN BLOOD BY AUTOMATED COUNT: 12 % (ref 11.5–17)
ERYTHROCYTE [SEDIMENTATION RATE] IN BLOOD: 15 MM/HR (ref 0–20)
EST. AVERAGE GLUCOSE BLD GHB EST-MCNC: 91.1 MG/DL
GFR SERPLBLD CREATININE-BSD FMLA CKD-EPI: 59 MLS/MIN/1.73/M2
GLOBULIN SER-MCNC: 3.2 GM/DL (ref 2.4–3.5)
GLUCOSE SERPL-MCNC: 97 MG/DL (ref 82–115)
HBA1C MFR BLD: 4.8 %
HCT VFR BLD AUTO: 39 % (ref 37–47)
HGB BLD-MCNC: 13.5 G/DL (ref 12–16)
IMM GRANULOCYTES # BLD AUTO: 0.01 X10(3)/MCL (ref 0–0.04)
IMM GRANULOCYTES NFR BLD AUTO: 0.2 %
LYMPHOCYTES # BLD AUTO: 0.71 X10(3)/MCL (ref 0.6–4.6)
LYMPHOCYTES NFR BLD AUTO: 11.3 %
MCH RBC QN AUTO: 32.1 PG (ref 27–31)
MCHC RBC AUTO-ENTMCNC: 34.6 G/DL (ref 33–36)
MCV RBC AUTO: 92.6 FL (ref 80–94)
MONOCYTES # BLD AUTO: 0.54 X10(3)/MCL (ref 0.1–1.3)
MONOCYTES NFR BLD AUTO: 8.6 %
NEUTROPHILS # BLD AUTO: 4.89 X10(3)/MCL (ref 2.1–9.2)
NEUTROPHILS NFR BLD AUTO: 78.1 %
NRBC BLD AUTO-RTO: 0 %
PLATELET # BLD AUTO: 188 X10(3)/MCL (ref 130–400)
PMV BLD AUTO: 10.8 FL (ref 7.4–10.4)
POTASSIUM SERPL-SCNC: 4 MMOL/L (ref 3.5–5.1)
PROT SERPL-MCNC: 7.2 GM/DL (ref 5.8–7.6)
RBC # BLD AUTO: 4.21 X10(6)/MCL (ref 4.2–5.4)
SODIUM SERPL-SCNC: 138 MMOL/L (ref 136–145)
TSH SERPL-ACNC: 1.15 UIU/ML (ref 0.35–4.94)
VIT B12 SERPL-MCNC: 827 PG/ML (ref 213–816)
WBC # SPEC AUTO: 6.26 X10(3)/MCL (ref 4.5–11.5)

## 2023-10-16 PROCEDURE — 1159F MED LIST DOCD IN RCRD: CPT | Mod: CPTII,,, | Performed by: NURSE PRACTITIONER

## 2023-10-16 PROCEDURE — 84443 ASSAY THYROID STIM HORMONE: CPT | Performed by: NURSE PRACTITIONER

## 2023-10-16 PROCEDURE — 99214 PR OFFICE/OUTPT VISIT, EST, LEVL IV, 30-39 MIN: ICD-10-PCS | Mod: ,,, | Performed by: NURSE PRACTITIONER

## 2023-10-16 PROCEDURE — 1101F PT FALLS ASSESS-DOCD LE1/YR: CPT | Mod: CPTII,,, | Performed by: NURSE PRACTITIONER

## 2023-10-16 PROCEDURE — 3075F SYST BP GE 130 - 139MM HG: CPT | Mod: CPTII,,, | Performed by: NURSE PRACTITIONER

## 2023-10-16 PROCEDURE — 1160F PR REVIEW ALL MEDS BY PRESCRIBER/CLIN PHARMACIST DOCUMENTED: ICD-10-PCS | Mod: CPTII,,, | Performed by: NURSE PRACTITIONER

## 2023-10-16 PROCEDURE — 1160F RVW MEDS BY RX/DR IN RCRD: CPT | Mod: CPTII,,, | Performed by: NURSE PRACTITIONER

## 2023-10-16 PROCEDURE — 85025 COMPLETE CBC W/AUTO DIFF WBC: CPT | Performed by: NURSE PRACTITIONER

## 2023-10-16 PROCEDURE — 3078F PR MOST RECENT DIASTOLIC BLOOD PRESSURE < 80 MM HG: ICD-10-PCS | Mod: CPTII,,, | Performed by: NURSE PRACTITIONER

## 2023-10-16 PROCEDURE — 80053 COMPREHEN METABOLIC PANEL: CPT | Performed by: NURSE PRACTITIONER

## 2023-10-16 PROCEDURE — 36415 COLL VENOUS BLD VENIPUNCTURE: CPT | Performed by: NURSE PRACTITIONER

## 2023-10-16 PROCEDURE — 1159F PR MEDICATION LIST DOCUMENTED IN MEDICAL RECORD: ICD-10-PCS | Mod: CPTII,,, | Performed by: NURSE PRACTITIONER

## 2023-10-16 PROCEDURE — 3075F PR MOST RECENT SYSTOLIC BLOOD PRESS GE 130-139MM HG: ICD-10-PCS | Mod: CPTII,,, | Performed by: NURSE PRACTITIONER

## 2023-10-16 PROCEDURE — 3288F PR FALLS RISK ASSESSMENT DOCUMENTED: ICD-10-PCS | Mod: CPTII,,, | Performed by: NURSE PRACTITIONER

## 2023-10-16 PROCEDURE — 85652 RBC SED RATE AUTOMATED: CPT | Performed by: NURSE PRACTITIONER

## 2023-10-16 PROCEDURE — 99214 OFFICE O/P EST MOD 30 MIN: CPT | Mod: ,,, | Performed by: NURSE PRACTITIONER

## 2023-10-16 PROCEDURE — 3078F DIAST BP <80 MM HG: CPT | Mod: CPTII,,, | Performed by: NURSE PRACTITIONER

## 2023-10-16 PROCEDURE — 3288F FALL RISK ASSESSMENT DOCD: CPT | Mod: CPTII,,, | Performed by: NURSE PRACTITIONER

## 2023-10-16 PROCEDURE — 83880 ASSAY OF NATRIURETIC PEPTIDE: CPT | Performed by: NURSE PRACTITIONER

## 2023-10-16 PROCEDURE — 82607 VITAMIN B-12: CPT | Performed by: NURSE PRACTITIONER

## 2023-10-16 PROCEDURE — 83036 HEMOGLOBIN GLYCOSYLATED A1C: CPT | Performed by: NURSE PRACTITIONER

## 2023-10-16 PROCEDURE — 1101F PR PT FALLS ASSESS DOC 0-1 FALLS W/OUT INJ PAST YR: ICD-10-PCS | Mod: CPTII,,, | Performed by: NURSE PRACTITIONER

## 2023-10-16 RX ORDER — BRIMONIDINE TARTRATE 2 MG/ML
SOLUTION/ DROPS OPHTHALMIC
COMMUNITY
Start: 2023-10-11

## 2023-10-16 NOTE — PROGRESS NOTES
Subjective:      Patient ID: Danae Mora is a 87 y.o. female.    Chief Complaint: Numbness (C/o numbness in hands and feet/Taken gabapentin in the past-did not agree with her), Generalized Body Aches (Was given diclofenac/lidocaine and states helps somewhat), Foot Swelling, and Follow-up (States she wants lab work done bc she has not done any in a long time)      HPI: Patient here today for c/o BUE/BLE numbness. She has taken gabapentin in the past, which she did not tolerate with excessive fatigue/drowsiness. She has been having s/s for several years. She has previously been evaluated by Dr. Vazquez, as well. She has been using prior RX of compound cream with relief. Issues with generalized body aches.  Also, c/o BLE edema.  Requesting work up with labs. She has not had labs done since May of '22. Admits to eating a lot of starchy foods since LOV due to issues with maintaining weight.  Concern for possible diabetes.  Also, issues with bilateral shoulder pain, L>R.  She did see Marcos Hernandez for issues with shoulder pain, who ordered XR and suspected related to cervical issues. It appears that she was recommended to see Dr. Bo, PM. She was never referred. Significant h/o LESI per Dr. Valles. She has not had cervical injections in the past. Issues with BUE/BLE numbness. Hand have been hurting as well.  She saw OSMIN Ortiz with  ENT last week with recommendation for repeat audiology eval r/t imbalance issues.     Review of patient's allergies indicates:   Allergen Reactions    Celebrex [celecoxib] Anaphylaxis    Gabapentin     Sulfamethoxazole        Review of Systems  Constitutional: No fever, No chills, No sweats, No fatigue, No weight loss.  Respiratory: No shortness of breath, No cough, No sputum production, No wheezing, exertional dyspnea.   Cardiovascular: No chest pain, No palpitations, No claudication, No orthopnea, BLE peripheral edema.  Genitourinary: No dysuria, No hematuria,  frequency.  Endocrine: excessive thirst, polyuria, No cold intolerance, No heat intolerance.  Musculoskeletal: Gen body aches, pako shoulder pain L>R, pako hand pain  Integumentary: No rash, No ecchymosis.  Neurologic: No altered mental status, No headaches. BUE/BLE numbness    Objective:   Visit Vitals  /76 (BP Location: Left arm, Patient Position: Sitting)   Pulse 69   Resp 18   Ht 5' (1.524 m)   Wt 49.4 kg (109 lb)   SpO2 99%   BMI 21.29 kg/m²     The patient's weight trend is below:   Wt Readings from Last 4 Encounters:   10/16/23 49.4 kg (109 lb)   06/29/23 48.5 kg (107 lb)   06/22/23 47.6 kg (105 lb)   06/08/22 47.6 kg (105 lb)        Physical Exam  Vitals and nursing note reviewed.   Constitutional:       General: She is not in acute distress.     Appearance: Normal appearance. She is normal weight. She is not ill-appearing, toxic-appearing or diaphoretic.   HENT:      Head: Normocephalic and atraumatic.   Cardiovascular:      Rate and Rhythm: Normal rate and regular rhythm.      Heart sounds: Normal heart sounds.   Pulmonary:      Effort: Pulmonary effort is normal.      Breath sounds: Normal breath sounds.   Musculoskeletal:      Right lower leg: Edema (mild 1+ pitting edema) present.      Left lower leg: Edema (mild 1+ pitting edema) present.   Skin:     General: Skin is warm and dry.   Neurological:      General: No focal deficit present.      Mental Status: She is alert and oriented to person, place, and time. Mental status is at baseline.       Assessment/Plan:   1. Shortness of breath  Work up with labs  Consider further eval if s/s worsen/persist  Rec gradual inc in activity as tolerated    - BNP    2. Generalized body aches  Work up with labs, including sed rate    - CBC Auto Differential  - Comprehensive Metabolic Panel  - Hemoglobin A1C  - TSH    3. Peripheral polyneuropathy  See #2  Previous intolerance to gabapentin, although consider initiating at low dose at bedtime in future if  needed  Tylenol as needed  Work up for possible T2DM    - CBC Auto Differential  - Comprehensive Metabolic Panel  - Hemoglobin A1C  - TSH  - Vitamin B12    4. Cervicalgia    - MRI Cervical Spine Without Contrast; Future  - Sedimentation rate    5. Chronic pain of both shoulders  Personally reviewed Dr. Saavedra OV note  MRI neck r/o c-spine radiculopathy    6. Bilateral hand pain  Tylenol as needed  Suspect OA  Work up with Sed    - Sedimentation rate    7. Imbalance  Recommend cautious mobility    8. Bilateral lower extremity edema  Low salt diet  Work up with labs  Elevate LE    - CBC Auto Differential  - Comprehensive Metabolic Panel  - Hemoglobin A1C  - TSH  - BNP    9. Other urinary incontinence  Work up with labs and UA    10. Primary hypertension  HYPERTENSION RECOMMENDATIONS:  Continue current treatment regimen.  Dietary sodium restriction.  Regular aerobic exercise.  Reduce stress.    - CBC Auto Differential  - Comprehensive Metabolic Panel  - Hemoglobin A1C  - TSH    11. 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    BRIMONIDINE 0.2% (ALPHAGAN) 0.2 % DROP    Place into both eyes.    CHLORPHENIRAMINE-PSEUDOEPHEDRINE-ACETAMINOPHEN (SINUTAB) 2- MG PER TABLET    Take 500 mg by mouth.    HYDROXYCHLOROQUINE (PLAQUENIL) 200 MG TABLET    Take 1 tablet (200 mg total) by mouth once daily.    METOPROLOL SUCCINATE (TOPROL-XL) 50 MG 24 HR TABLET    Take 1 tablet (50 mg total) by mouth once daily.    UNABLE TO FIND    Diclofenac Na/ Lidocaine HCl 0.925/0.3% Gel (w/w) [09357]   Discontinued Medications    DICLOFENAC (VOLTAREN) 75 MG EC TABLET    Take 1 tablet (75 mg total) by mouth 2 (two) times daily.    DICLOFENAC SODIUM (VOLTAREN) 1 % GEL    Apply 2 g topically once  daily.    LUMIGAN 0.01 % DROP    Place 1 drop into both eyes 2 (two) times a day.    MEGESTROL (MEGACE) 400 MG/10 ML (40 MG/ML) SUSP    Take 5 mLs (200 mg total) by mouth once daily.    PANTOPRAZOLE (PROTONIX) 40 MG TABLET    Take 40 mg by mouth once daily.        No follow-ups on file.    Chemistry:  Lab Results   Component Value Date     05/05/2022    K 4.0 05/05/2022    CHLORIDE 107 05/05/2022    BUN 36.7 (H) 05/05/2022    CREATININE 1.05 (H) 05/05/2022    GLUCOSE 109 05/05/2022    CALCIUM 9.5 05/05/2022    ALKPHOS 80 04/05/2022    LABPROT 7.2 04/05/2022    ALBUMIN 4.0 04/05/2022    BILIDIR 0.2 04/05/2022    IBILI 0.20 04/05/2022    AST 18 04/05/2022    ALT 15 04/05/2022        Lab Results   Component Value Date    HGBA1C 4.9 01/19/2021        Hematology:  Lab Results   Component Value Date    WBC 5.3 04/05/2022    HGB 12.8 04/05/2022    HCT 38.1 04/05/2022     04/05/2022       Lipid Panel:  Lab Results   Component Value Date    CHOL 205 (H) 05/17/2021    HDL 53 05/17/2021    .00 05/17/2021    TRIG 106 05/17/2021    TOTALCHOLEST 4 05/17/2021        Urine:  Lab Results   Component Value Date    COLORUA Yellow 05/05/2022    APPEARANCEUA Clear 05/05/2022    SGUA 1.008 05/05/2022    PHUA 6.0 05/05/2022    PROTEINUA Negative 05/05/2022    GLUCOSEUA Negative 05/05/2022    KETONESUA Negative 05/05/2022    BLOODUA Trace (A) 05/05/2022    NITRITESUA Negative 05/05/2022    LEUKOCYTESUR 1+ (A) 05/05/2022    RBCUA None Seen 05/05/2022    WBCUA 11-20 (A) 05/05/2022    BACTERIA 2+ (A) 05/05/2022

## 2023-10-16 NOTE — TELEPHONE ENCOUNTER
----- Message from Shikha Grider NP sent at 10/16/2023  2:43 PM CDT -----  Please let pt know that all labs and UA stable. No evidence of infection, inflammation, HF, or diabetes. Mild dehydration noted. Rec adequate water intake. Consider re-attempt of gabapentin at 100mg in PM before bed. I can RX if she would like to retry. I would have some concerns given previous response and current issues with instability of gait.

## 2023-10-17 ENCOUNTER — TELEPHONE (OUTPATIENT)
Dept: INTERNAL MEDICINE | Facility: CLINIC | Age: 88
End: 2023-10-17
Payer: MEDICARE

## 2023-10-17 NOTE — TELEPHONE ENCOUNTER
Patient indicated MRI was set  up.  She did not take the Gabapentin at bedtime because she had a doctors appt this morning and did not want to be drowsy when she drives.    Do you want to advise on alterative medication?

## 2023-10-17 NOTE — TELEPHONE ENCOUNTER
----- Message from Maribel Grider sent at 10/17/2023  9:48 AM CDT -----  Regarding: Patient wanted me to update you  Patient asked if I could pass a message along to you about the medication you told her to take at night. She did not take it because everything she was reading on line said it was for epileptic seizures. Please call the patient to follow up with the patient about this medication. She did not give me the name of the medicine.

## 2023-12-18 ENCOUNTER — TELEPHONE (OUTPATIENT)
Dept: INTERNAL MEDICINE | Facility: CLINIC | Age: 88
End: 2023-12-18
Payer: MEDICARE

## 2023-12-18 NOTE — TELEPHONE ENCOUNTER
----- Message from Daniela Kunz LPN sent at 12/14/2023  8:39 AM CST -----  Regarding: Dr. Topete 12/26/2023 6 month rv 1;20pm  Are there any outstanding tasks in chart? No    Is there any documentation of tasks? No    Has the pt seen another physician, been to ER, UCC, or admitted to hospital since last visit?    Has the pt done blood work or imaging since last visit?     5. PLEASE HAVE PATIENT BRING MEDICATION LIST OR BOTTLES TO EVERY OFFICE VISIT

## 2023-12-26 ENCOUNTER — OFFICE VISIT (OUTPATIENT)
Dept: INTERNAL MEDICINE | Facility: CLINIC | Age: 88
End: 2023-12-26
Payer: MEDICARE

## 2023-12-26 VITALS
RESPIRATION RATE: 18 BRPM | WEIGHT: 115 LBS | SYSTOLIC BLOOD PRESSURE: 128 MMHG | HEART RATE: 73 BPM | BODY MASS INDEX: 22.58 KG/M2 | HEIGHT: 60 IN | OXYGEN SATURATION: 98 % | DIASTOLIC BLOOD PRESSURE: 72 MMHG

## 2023-12-26 DIAGNOSIS — M06.9 RHEUMATOID ARTHRITIS INVOLVING MULTIPLE SITES, UNSPECIFIED WHETHER RHEUMATOID FACTOR PRESENT: ICD-10-CM

## 2023-12-26 DIAGNOSIS — G62.9 NEUROPATHY: ICD-10-CM

## 2023-12-26 DIAGNOSIS — M19.90 ARTHRITIS: ICD-10-CM

## 2023-12-26 DIAGNOSIS — I10 PRIMARY HYPERTENSION: Primary | ICD-10-CM

## 2023-12-26 PROCEDURE — 99214 OFFICE O/P EST MOD 30 MIN: CPT | Mod: ,,, | Performed by: INTERNAL MEDICINE

## 2023-12-26 PROCEDURE — 1160F PR REVIEW ALL MEDS BY PRESCRIBER/CLIN PHARMACIST DOCUMENTED: ICD-10-PCS | Mod: CPTII,,, | Performed by: INTERNAL MEDICINE

## 2023-12-26 PROCEDURE — 3288F FALL RISK ASSESSMENT DOCD: CPT | Mod: CPTII,,, | Performed by: INTERNAL MEDICINE

## 2023-12-26 PROCEDURE — 1159F MED LIST DOCD IN RCRD: CPT | Mod: CPTII,,, | Performed by: INTERNAL MEDICINE

## 2023-12-26 PROCEDURE — 99214 PR OFFICE/OUTPT VISIT, EST, LEVL IV, 30-39 MIN: ICD-10-PCS | Mod: ,,, | Performed by: INTERNAL MEDICINE

## 2023-12-26 PROCEDURE — 1126F AMNT PAIN NOTED NONE PRSNT: CPT | Mod: CPTII,,, | Performed by: INTERNAL MEDICINE

## 2023-12-26 PROCEDURE — 3288F PR FALLS RISK ASSESSMENT DOCUMENTED: ICD-10-PCS | Mod: CPTII,,, | Performed by: INTERNAL MEDICINE

## 2023-12-26 PROCEDURE — 1159F PR MEDICATION LIST DOCUMENTED IN MEDICAL RECORD: ICD-10-PCS | Mod: CPTII,,, | Performed by: INTERNAL MEDICINE

## 2023-12-26 PROCEDURE — 1160F RVW MEDS BY RX/DR IN RCRD: CPT | Mod: CPTII,,, | Performed by: INTERNAL MEDICINE

## 2023-12-26 PROCEDURE — 1101F PT FALLS ASSESS-DOCD LE1/YR: CPT | Mod: CPTII,,, | Performed by: INTERNAL MEDICINE

## 2023-12-26 PROCEDURE — 1101F PR PT FALLS ASSESS DOC 0-1 FALLS W/OUT INJ PAST YR: ICD-10-PCS | Mod: CPTII,,, | Performed by: INTERNAL MEDICINE

## 2023-12-26 PROCEDURE — 1126F PR PAIN SEVERITY QUANTIFIED, NO PAIN PRESENT: ICD-10-PCS | Mod: CPTII,,, | Performed by: INTERNAL MEDICINE

## 2023-12-26 RX ORDER — ACETAMINOPHEN AND CODEINE PHOSPHATE 300; 30 MG/1; MG/1
1 TABLET ORAL 2 TIMES DAILY
Qty: 30 TABLET | Refills: 0 | Status: SHIPPED | OUTPATIENT
Start: 2023-12-26 | End: 2024-01-05

## 2023-12-26 RX ORDER — DICLOFENAC SODIUM 10 MG/G
4 GEL TOPICAL 4 TIMES DAILY
COMMUNITY
Start: 2023-11-27 | End: 2023-12-26 | Stop reason: SDUPTHER

## 2023-12-26 NOTE — PROGRESS NOTES
Patient ID: Danae Mora is a 88 y.o. female.  Chief Complaint: Follow-up (6 month. Complaining of neuropathy )    HPI:     89 yo  here with diffuse  arthritis  ,  morning  stiffness , here to discuss again bilateral hand  paresthesias ,   in the past we had discussed is all do to  arthritis  compressing  carpal nerves  ,  no signifuicant relief to Tylenol plain  , I will add  tYlenol #3       Current Outpatient Medications:     amLODIPine (NORVASC) 5 MG tablet, Take 1 tablet (5 mg total) by mouth 2 (two) times a day., Disp: 180 tablet, Rfl: 3    brimonidine 0.2% (ALPHAGAN) 0.2 % Drop, Place into both eyes., Disp: , Rfl:     hydrOXYchloroQUINE (PLAQUENIL) 200 mg tablet, Take 1 tablet (200 mg total) by mouth once daily., Disp: 90 tablet, Rfl: 3    metoprolol succinate (TOPROL-XL) 50 MG 24 hr tablet, Take 1 tablet (50 mg total) by mouth once daily., Disp: 90 tablet, Rfl: 3    UNABLE TO FIND, Diclofenac Na/ Lidocaine HCl 0.925/0.3% Gel (w/w) [66841], Disp: , Rfl:     acetaminophen-codeine 300-30mg (TYLENOL #3) 300-30 mg Tab, Take 1 tablet by mouth 2 (two) times daily. for 10 days, Disp: 30 tablet, Rfl: 0    chlorpheniramine-pseudoephedrine-acetaminophen (SINUTAB) 2- mg per tablet, Take 500 mg by mouth., Disp: , Rfl:   ROS:   Constitutional: No weight gain, No fever, No chills, No fatigue.   Eyes: No blurring, No visual disturbances.   Ear/Nose/Mouth/Throat: No decreased hearing, No ear pain, No nasal congestion, No sore throat.   Respiratory: No shortness of breath, No cough, No wheezing.   Cardiovascular: No chest pain, No palpitations, No peripheral edema.   Gastrointestinal: No nausea, No vomiting, No diarrhea, No constipation, No abdominal pain.   Genitourinary: No dysuria, No hematuria.   Hematology/Lymphatics: No bruising tendency, No bleeding tendency, No swollen lymph glands.   Endocrine: No excessive thirst, No polyuria, No excessive hunger.   Musculoskeletal: all  of  joint pain, No muscle pain,   mild  decreased range of motion.   Integumentary: No rash, No pruritus.   Neurologic: No abnormal balance, No confusion, No headache. Paresthesias  of botyh hands   Psychiatric: No anxiety, No depression, Not suicidal, No hallucinations.    PE/Vitals:   /72   Pulse 73   Resp 18   Ht 5' (1.524 m)   Wt 52.2 kg (115 lb)   SpO2 98%   BMI 22.46 kg/m²   General: Alert and oriented, No acute distress.   Eye: Normal conjunctiva without exudate.  HENMT: Normocephalic/AT, Normal hearing, Oral mucosa is moist and pink   Neck: No goiter visualized.   Respiratory: Lungs CTAB, Respirations are non-labored, Breath sounds are equal, Symmetrical chest wall expansion.  Cardiovascular: Normal rate, Regular rhythm, No murmur, trace  edema. Of ankle   Gastrointestinal: Non-distended.   Genitourinary: Deferred.  Musculoskeletal: decrease  ROM of all joints , Normal gait, No deformities or amputations.  Integumentary: Warm, Dry, Intact. No diaphoresis, or flushing.  Neurologic: No focal deficits, Cranial Nerves II-XII are grossly intact.   Psychiatric: Cooperative, Appropriate mood & affect, Normal judgment, Non-suicidal.  Assessment/Plan   1. Primary hypertension  Comments:  stable on metroprolol    2. Neuropathy  Comments:  continue  gabapentin at HS   add Tylenol #3    3. Rheumatoid arthritis involving multiple sites, unspecified whether rheumatoid factor present  Comments:  jake    4. Arthritis  Comments:  ad  Tylenol  #3    Other orders  -     acetaminophen-codeine 300-30mg (TYLENOL #3) 300-30 mg Tab; Take 1 tablet by mouth 2 (two) times daily. for 10 days  Dispense: 30 tablet; Refill: 0      No orders of the defined types were placed in this encounter.    Education and counseling done face to face regarding medical conditions and plan. Contact office if new symptoms develop. Should any symptoms ever significantly worsen seek emergency medical attention/go to ER. Follow up at least yearly for wellness or sooner PRN.  Nurse to call patient with any results. The patient is receptive, expresses understanding and is agreeable to plan. All questions have been answered.  Follow up in about 4 months (around 4/26/2024).

## 2024-01-12 ENCOUNTER — TELEPHONE (OUTPATIENT)
Dept: INTERNAL MEDICINE | Facility: CLINIC | Age: 89
End: 2024-01-12
Payer: MEDICARE

## 2024-01-12 DIAGNOSIS — M79.2 NERVE PAIN: Primary | ICD-10-CM

## 2024-01-12 RX ORDER — GABAPENTIN 300 MG/1
300 CAPSULE ORAL NIGHTLY
Qty: 30 CAPSULE | Refills: 5 | Status: SHIPPED | OUTPATIENT
Start: 2024-01-12 | End: 2024-01-16 | Stop reason: SDUPTHER

## 2024-01-12 RX ORDER — GABAPENTIN 300 MG/1
300 CAPSULE ORAL NIGHTLY
COMMUNITY
End: 2024-01-12 | Stop reason: SDUPTHER

## 2024-01-12 NOTE — TELEPHONE ENCOUNTER
----- Message from Sona Dickey sent at 1/12/2024  9:24 AM CST -----  .Type:  Needs Medical Advice    Who Called: pt  Symptoms (please be specific):    How long has patient had these symptoms:    Pharmacy name and phone #:  [facility]  Would the patient rather a call back or a response via [facility]?   Best Call Back Number: [phone]  Additional Information: pt and pcp talked about it gabapentin please advice  also Pierre she said she miss you and needs to talk to you

## 2024-01-13 DIAGNOSIS — M79.2 NERVE PAIN: ICD-10-CM

## 2024-01-16 ENCOUNTER — TELEPHONE (OUTPATIENT)
Dept: INTERNAL MEDICINE | Facility: CLINIC | Age: 89
End: 2024-01-16
Payer: MEDICARE

## 2024-01-16 DIAGNOSIS — M79.2 NERVE PAIN: ICD-10-CM

## 2024-01-16 RX ORDER — GABAPENTIN 300 MG/1
300 CAPSULE ORAL NIGHTLY
Refills: 5 | OUTPATIENT
Start: 2024-01-16

## 2024-01-16 RX ORDER — GABAPENTIN 300 MG/1
300 CAPSULE ORAL NIGHTLY
Qty: 30 CAPSULE | Refills: 5 | Status: SHIPPED | OUTPATIENT
Start: 2024-01-16 | End: 2024-01-22 | Stop reason: SDUPTHER

## 2024-01-16 NOTE — TELEPHONE ENCOUNTER
----- Message from Freda Anguiano sent at 1/16/2024  8:38 AM CST -----  Regarding: med refill  .Type:  RX Refill Request    Who Called: Pt  Refill or New Rx:Refill  RX Name and Strength:gabapentin (NEURONTIN) 300 MG capsule   How is the patient currently taking it? (ex. 1XDay):1xday  Is this a 30 day or 90 day RX:  Preferred Pharmacy with phone number:[facility] [address] - [facility] [facility]  Local or Mail Order:Local  Ordering Provider:Efrem  Would the patient rather a call back or a response via [facility]? Call back  Best Call Back Number:[phone]  Additional Information:

## 2024-01-22 ENCOUNTER — TELEPHONE (OUTPATIENT)
Dept: INTERNAL MEDICINE | Facility: CLINIC | Age: 89
End: 2024-01-22
Payer: MEDICARE

## 2024-01-22 DIAGNOSIS — M79.2 NERVE PAIN: ICD-10-CM

## 2024-01-22 RX ORDER — GABAPENTIN 300 MG/1
300 CAPSULE ORAL NIGHTLY
Qty: 90 CAPSULE | Refills: 3 | Status: SHIPPED | OUTPATIENT
Start: 2024-01-22 | End: 2024-01-24

## 2024-01-22 NOTE — TELEPHONE ENCOUNTER
----- Message from Sona Keli sent at 1/22/2024  9:08 AM CST -----  .Type:  Needs Medical Advice    Who Called: pt  Symptoms (please be specific):    How long has patient had these symptoms:    Pharmacy name and phone #:    Would the patient rather a call back or a response via [facility]? cb  Best Call Back Number: [phone]  Additional Information: pt called and said marlon lockett wants pcp to call them to approve her gabapentin

## 2024-01-23 DIAGNOSIS — M79.2 NERVE PAIN: ICD-10-CM

## 2024-01-24 RX ORDER — GABAPENTIN 300 MG/1
300 CAPSULE ORAL NIGHTLY
Qty: 90 CAPSULE | Refills: 3 | Status: SHIPPED | OUTPATIENT
Start: 2024-01-24

## 2024-03-25 ENCOUNTER — TELEPHONE (OUTPATIENT)
Dept: INTERNAL MEDICINE | Facility: CLINIC | Age: 89
End: 2024-03-25
Payer: MEDICARE

## 2024-03-25 DIAGNOSIS — M06.9 RHEUMATOID ARTHRITIS INVOLVING MULTIPLE SITES, UNSPECIFIED WHETHER RHEUMATOID FACTOR PRESENT: ICD-10-CM

## 2024-03-25 RX ORDER — HYDROXYCHLOROQUINE SULFATE 200 MG/1
200 TABLET, FILM COATED ORAL DAILY
Qty: 90 TABLET | Refills: 3 | Status: SHIPPED | OUTPATIENT
Start: 2024-03-25

## 2024-03-25 NOTE — TELEPHONE ENCOUNTER
----- Message from Sunita Chau sent at 3/25/2024 10:47 AM CDT -----  Regarding: refill  Type:  RX Refill Request    Who Called: pt  Refill or New Rx:refill  RX Name and Strength:hydrOXYchloroQUINE (PLAQUENIL) 200 mg tablet  How is the patient currently taking it? (ex. 1XDay):1 day  Is this a 30 day or 90 day RX:90  Preferred Pharmacy with phone number: [facility]  Local or Mail Order:mail order  Ordering Provider:dr pham  Would the patient rather a call back or a response via [facility]? C/b  Best Call Back Number:[phone]  Additional Information:

## 2024-04-12 DIAGNOSIS — I10 PRIMARY HYPERTENSION: ICD-10-CM

## 2024-04-12 RX ORDER — AMLODIPINE BESYLATE 5 MG/1
5 TABLET ORAL 2 TIMES DAILY
Qty: 180 TABLET | Refills: 3 | Status: SHIPPED | OUTPATIENT
Start: 2024-04-12

## 2024-04-19 ENCOUNTER — TELEPHONE (OUTPATIENT)
Dept: INTERNAL MEDICINE | Facility: CLINIC | Age: 89
End: 2024-04-19
Payer: MEDICARE

## 2024-04-19 NOTE — TELEPHONE ENCOUNTER
----- Message from Daniela Kunz LPN sent at 4/18/2024  7:46 AM CDT -----  Regarding: Dr. Topete 04/29/2024 4 month rv 1020  Are there any outstanding tasks in chart? No    Is there any documentation of tasks? No    Has the pt seen another physician, been to ER, [facility], or admitted to hospital since last visit?    Has the pt done blood work or imaging since last visit?     5. PLEASE HAVE PATIENT BRING MEDICATION LIST OR BOTTLES TO EVERY OFFICE VISIT

## 2024-04-29 ENCOUNTER — OFFICE VISIT (OUTPATIENT)
Dept: INTERNAL MEDICINE | Facility: CLINIC | Age: 89
End: 2024-04-29
Payer: MEDICARE

## 2024-04-29 VITALS
DIASTOLIC BLOOD PRESSURE: 70 MMHG | WEIGHT: 113 LBS | OXYGEN SATURATION: 98 % | HEIGHT: 60 IN | HEART RATE: 70 BPM | SYSTOLIC BLOOD PRESSURE: 126 MMHG | BODY MASS INDEX: 22.19 KG/M2

## 2024-04-29 DIAGNOSIS — G62.9 NEUROPATHY: ICD-10-CM

## 2024-04-29 DIAGNOSIS — M06.9 RHEUMATOID ARTHRITIS INVOLVING MULTIPLE SITES, UNSPECIFIED WHETHER RHEUMATOID FACTOR PRESENT: ICD-10-CM

## 2024-04-29 DIAGNOSIS — I10 PRIMARY HYPERTENSION: Primary | ICD-10-CM

## 2024-04-29 DIAGNOSIS — R25.1 TREMORS OF NERVOUS SYSTEM: ICD-10-CM

## 2024-04-29 PROCEDURE — 3288F FALL RISK ASSESSMENT DOCD: CPT | Mod: CPTII,,, | Performed by: INTERNAL MEDICINE

## 2024-04-29 PROCEDURE — 1125F AMNT PAIN NOTED PAIN PRSNT: CPT | Mod: CPTII,,, | Performed by: INTERNAL MEDICINE

## 2024-04-29 PROCEDURE — 99214 OFFICE O/P EST MOD 30 MIN: CPT | Mod: ,,, | Performed by: INTERNAL MEDICINE

## 2024-04-29 PROCEDURE — 1159F MED LIST DOCD IN RCRD: CPT | Mod: CPTII,,, | Performed by: INTERNAL MEDICINE

## 2024-04-29 PROCEDURE — 1160F RVW MEDS BY RX/DR IN RCRD: CPT | Mod: CPTII,,, | Performed by: INTERNAL MEDICINE

## 2024-04-29 PROCEDURE — 1101F PT FALLS ASSESS-DOCD LE1/YR: CPT | Mod: CPTII,,, | Performed by: INTERNAL MEDICINE

## 2024-04-29 NOTE — PROGRESS NOTES
Patient ID: Danae Mora is a 88 y.o. female.  Chief Complaint: Follow-up (4 month )    HPI:     89 yo female  with RA , HBP , essential tremors , and OAB,  here to discuss her wealth .  Concern  about tremors  but she  is already on low  dose  BB . Fhas  scoliosis  and  CTS and  spinal stenosis , reason of her  diffuse  arthralgia s  and  neuropathy.    Current Outpatient Medications:     amLODIPine (NORVASC) 5 MG tablet, Take 1 tablet (5 mg total) by mouth 2 (two) times a day., Disp: 180 tablet, Rfl: 3    brimonidine 0.2% (ALPHAGAN) 0.2 % Drop, Place into both eyes., Disp: , Rfl:     chlorpheniramine-pseudoephedrine-acetaminophen (SINUTAB) 2- mg per tablet, Take 500 mg by mouth., Disp: , Rfl:     gabapentin (NEURONTIN) 300 MG capsule, TAKE 1 CAPSULE EVERY       EVENING., Disp: 90 capsule, Rfl: 3    hydroxychloroquine (PLAQUENIL) 200 mg tablet, Take 1 tablet (200 mg total) by mouth once daily., Disp: 90 tablet, Rfl: 3    metoprolol succinate (TOPROL-XL) 50 MG 24 hr tablet, Take 1 tablet (50 mg total) by mouth once daily., Disp: 90 tablet, Rfl: 3    UNABLE TO FIND, Diclofenac Na/ Lidocaine HCl 0.925/0.3% Gel (w/w) [[id number]], Disp: , Rfl:   ROS:   Constitutional: No weight gain, No fever, No chills, No fatigue.   Eyes: No blurring, No visual disturbances.   Ear/Nose/Mouth/Throat: No decreased hearing, No ear pain, No nasal congestion, No sore throat.   Respiratory: No shortness of breath, No cough, No wheezing.   Cardiovascular: No chest pain, No palpitations, No peripheral edema.   Gastrointestinal: No nausea, No vomiting, No diarrhea, No constipation, No abdominal pain.   Genitourinary: No dysuria, No hematuria.   Hematology/Lymphatics: No bruising tendency, No bleeding tendency, No swollen lymph glands.   Endocrine: No excessive thirst, No polyuria, No excessive hunger.   Musculoskeletal: No joint pain, No muscle pain, No decreased range of motion.   Integumentary: No rash, No pruritus.   Neurologic: No  abnormal balance, No confusion, No headache.   Psychiatric: No anxiety, No depression, Not suicidal, No hallucinations.    PE/Vitals:   /70 (BP Location: Left arm, Patient Position: Sitting, BP Method: Small (Manual))   Pulse 70   Ht 5' (1.524 m)   Wt 51.3 kg (113 lb)   SpO2 98%   BMI 22.07 kg/m²   General: Alert and oriented, No acute distress.   Eye: Normal conjunctiva without exudate.  HENMT: Normocephalic/AT, Normal hearing, Oral mucosa is moist and pink   Neck: No goiter visualized.   Respiratory: Lungs CTAB, Respirations are non-labored, Breath sounds are equal, Symmetrical chest wall expansion.  Cardiovascular: Normal rate, Regular rhythm, No murmur, No edema.   Gastrointestinal: Non-distended.   Genitourinary: Deferred.  Musculoskeletal: Normal ROM, Normal gait, No deformities or amputations.  Integumentary: Warm, Dry, Intact. No diaphoresis, or flushing.  Neurologic: No focal deficits, Cranial Nerves II-XII are grossly intact.   Psychiatric: Cooperative, Appropriate mood & affect, Normal judgment, Non-suicidal.  Assessment/Plan   1. Primary hypertension  Comments:  stable on amlodipine  and  metroprolol    2. Rheumatoid arthritis involving multiple sites, unspecified whether rheumatoid factor present  Comments:  on plaquenil    3. Tremors of nervous system  Comments:  already on  low dose  BB    4. Neuropathy  Comments:  on gabapentin at HS      No orders of the defined types were placed in this encounter.    Education and counseling done face to face regarding medical conditions and plan. Contact office if new symptoms develop. Should any symptoms ever significantly worsen seek emergency medical attention/go to ER. Follow up at least yearly for wellness or sooner PRN. Nurse to call patient with any results. The patient is receptive, expresses understanding and is agreeable to plan. All questions have been answered.  Follow up in about 4 months (around 8/29/2024).

## 2024-05-06 ENCOUNTER — TELEPHONE (OUTPATIENT)
Dept: INTERNAL MEDICINE | Facility: CLINIC | Age: 89
End: 2024-05-06
Payer: MEDICARE

## 2024-05-06 NOTE — TELEPHONE ENCOUNTER
Spoke to patient, advised by Dr. Topete to Order from [employer], Not Rx.      Same OTC Meds when she got her knee's replaced.  She was able to get her son to order the recommended OTC Meds.

## 2024-05-06 NOTE — TELEPHONE ENCOUNTER
----- Message from Francy Connolly sent at 4/29/2024  1:00 PM CDT -----  Regarding: advice  Contact: `  Type:  Needs Medical Advice    Who Called: pt     Best Call Back Number: [phone]  Additional Information: stated that she got a script written on a card that she got from pcp. Stated that she is needing to speak to the nurse. Please advise

## 2024-06-06 ENCOUNTER — HOSPITAL ENCOUNTER (INPATIENT)
Facility: HOSPITAL | Age: 89
LOS: 5 days | Discharge: HOME HEALTH | DRG: 193 | End: 2024-06-11
Attending: EMERGENCY MEDICINE | Admitting: INTERNAL MEDICINE
Payer: MEDICARE

## 2024-06-06 DIAGNOSIS — R07.81 PLEURITIC CHEST PAIN: ICD-10-CM

## 2024-06-06 DIAGNOSIS — M25.519 SHOULDER PAIN: ICD-10-CM

## 2024-06-06 DIAGNOSIS — N39.0 FREQUENT UTI: ICD-10-CM

## 2024-06-06 DIAGNOSIS — I26.94 MULTIPLE SUBSEGMENTAL PULMONARY EMBOLI WITHOUT ACUTE COR PULMONALE: Primary | ICD-10-CM

## 2024-06-06 DIAGNOSIS — J18.9 LINGULAR PNEUMONIA: ICD-10-CM

## 2024-06-06 DIAGNOSIS — E86.0 DEHYDRATION: ICD-10-CM

## 2024-06-06 LAB
ALBUMIN SERPL-MCNC: 3.3 G/DL (ref 3.4–4.8)
ALBUMIN/GLOB SERPL: 0.7 RATIO (ref 1.1–2)
ALP SERPL-CCNC: 107 UNIT/L (ref 40–150)
ALT SERPL-CCNC: 32 UNIT/L (ref 0–55)
ANION GAP SERPL CALC-SCNC: 12 MEQ/L
AST SERPL-CCNC: 26 UNIT/L (ref 5–34)
BASOPHILS # BLD AUTO: 0.03 X10(3)/MCL
BASOPHILS NFR BLD AUTO: 0.4 %
BILIRUB SERPL-MCNC: 0.6 MG/DL
BUN SERPL-MCNC: 22.8 MG/DL (ref 9.8–20.1)
CALCIUM SERPL-MCNC: 9.9 MG/DL (ref 8.4–10.2)
CHLORIDE SERPL-SCNC: 105 MMOL/L (ref 98–107)
CO2 SERPL-SCNC: 22 MMOL/L (ref 23–31)
CREAT SERPL-MCNC: 1.07 MG/DL (ref 0.55–1.02)
CREAT/UREA NIT SERPL: 21
EOSINOPHIL # BLD AUTO: 0.02 X10(3)/MCL (ref 0–0.9)
EOSINOPHIL NFR BLD AUTO: 0.2 %
ERYTHROCYTE [DISTWIDTH] IN BLOOD BY AUTOMATED COUNT: 12.3 % (ref 11.5–17)
GFR SERPLBLD CREATININE-BSD FMLA CKD-EPI: 51 ML/MIN/1.73/M2
GLOBULIN SER-MCNC: 4.7 GM/DL (ref 2.4–3.5)
GLUCOSE SERPL-MCNC: 122 MG/DL (ref 82–115)
HCT VFR BLD AUTO: 38.5 % (ref 37–47)
HGB BLD-MCNC: 13.1 G/DL (ref 12–16)
IMM GRANULOCYTES # BLD AUTO: 0.03 X10(3)/MCL (ref 0–0.04)
IMM GRANULOCYTES NFR BLD AUTO: 0.4 %
LACTATE SERPL-SCNC: 1.2 MMOL/L (ref 0.5–2.2)
LYMPHOCYTES # BLD AUTO: 0.57 X10(3)/MCL (ref 0.6–4.6)
LYMPHOCYTES NFR BLD AUTO: 7 %
MCH RBC QN AUTO: 32.2 PG (ref 27–31)
MCHC RBC AUTO-ENTMCNC: 34 G/DL (ref 33–36)
MCV RBC AUTO: 94.6 FL (ref 80–94)
MONOCYTES # BLD AUTO: 0.69 X10(3)/MCL (ref 0.1–1.3)
MONOCYTES NFR BLD AUTO: 8.5 %
NEUTROPHILS # BLD AUTO: 6.82 X10(3)/MCL (ref 2.1–9.2)
NEUTROPHILS NFR BLD AUTO: 83.5 %
NRBC BLD AUTO-RTO: 0 %
OHS QRS DURATION: 76 MS
OHS QTC CALCULATION: 458 MS
PLATELET # BLD AUTO: 262 X10(3)/MCL (ref 130–400)
PMV BLD AUTO: 9.8 FL (ref 7.4–10.4)
POTASSIUM SERPL-SCNC: 4.1 MMOL/L (ref 3.5–5.1)
PROT SERPL-MCNC: 8 GM/DL (ref 5.8–7.6)
RBC # BLD AUTO: 4.07 X10(6)/MCL (ref 4.2–5.4)
SODIUM SERPL-SCNC: 139 MMOL/L (ref 136–145)
TROPONIN I SERPL-MCNC: <0.01 NG/ML (ref 0–0.04)
WBC # SPEC AUTO: 8.16 X10(3)/MCL (ref 4.5–11.5)

## 2024-06-06 PROCEDURE — 25500020 PHARM REV CODE 255: Performed by: EMERGENCY MEDICINE

## 2024-06-06 PROCEDURE — 63600175 PHARM REV CODE 636 W HCPCS: Performed by: EMERGENCY MEDICINE

## 2024-06-06 PROCEDURE — 84484 ASSAY OF TROPONIN QUANT: CPT | Performed by: NURSE PRACTITIONER

## 2024-06-06 PROCEDURE — 99291 CRITICAL CARE FIRST HOUR: CPT

## 2024-06-06 PROCEDURE — 85025 COMPLETE CBC W/AUTO DIFF WBC: CPT | Performed by: NURSE PRACTITIONER

## 2024-06-06 PROCEDURE — 93005 ELECTROCARDIOGRAM TRACING: CPT

## 2024-06-06 PROCEDURE — 83605 ASSAY OF LACTIC ACID: CPT | Performed by: EMERGENCY MEDICINE

## 2024-06-06 PROCEDURE — 11000001 HC ACUTE MED/SURG PRIVATE ROOM

## 2024-06-06 PROCEDURE — 93010 ELECTROCARDIOGRAM REPORT: CPT | Mod: ,,, | Performed by: INTERNAL MEDICINE

## 2024-06-06 PROCEDURE — 87040 BLOOD CULTURE FOR BACTERIA: CPT | Performed by: EMERGENCY MEDICINE

## 2024-06-06 PROCEDURE — 80053 COMPREHEN METABOLIC PANEL: CPT | Performed by: NURSE PRACTITIONER

## 2024-06-06 PROCEDURE — 25000003 PHARM REV CODE 250: Performed by: EMERGENCY MEDICINE

## 2024-06-06 RX ORDER — TALC
6 POWDER (GRAM) TOPICAL NIGHTLY PRN
Status: DISCONTINUED | OUTPATIENT
Start: 2024-06-06 | End: 2024-06-11 | Stop reason: HOSPADM

## 2024-06-06 RX ORDER — ONDANSETRON HYDROCHLORIDE 2 MG/ML
4 INJECTION, SOLUTION INTRAVENOUS EVERY 8 HOURS PRN
Status: DISCONTINUED | OUTPATIENT
Start: 2024-06-06 | End: 2024-06-11 | Stop reason: HOSPADM

## 2024-06-06 RX ORDER — ACETAMINOPHEN 325 MG/1
650 TABLET ORAL EVERY 8 HOURS PRN
Status: DISCONTINUED | OUTPATIENT
Start: 2024-06-06 | End: 2024-06-11 | Stop reason: HOSPADM

## 2024-06-06 RX ORDER — FAMOTIDINE 20 MG/1
20 TABLET, FILM COATED ORAL DAILY
Status: DISCONTINUED | OUTPATIENT
Start: 2024-06-07 | End: 2024-06-11 | Stop reason: HOSPADM

## 2024-06-06 RX ORDER — POLYETHYLENE GLYCOL 3350 17 G/17G
17 POWDER, FOR SOLUTION ORAL DAILY
Status: DISCONTINUED | OUTPATIENT
Start: 2024-06-07 | End: 2024-06-11 | Stop reason: HOSPADM

## 2024-06-06 RX ORDER — SODIUM CHLORIDE 0.9 % (FLUSH) 0.9 %
10 SYRINGE (ML) INJECTION
Status: DISCONTINUED | OUTPATIENT
Start: 2024-06-06 | End: 2024-06-11 | Stop reason: HOSPADM

## 2024-06-06 RX ORDER — AMLODIPINE BESYLATE 5 MG/1
5 TABLET ORAL
Status: COMPLETED | OUTPATIENT
Start: 2024-06-06 | End: 2024-06-06

## 2024-06-06 RX ORDER — ENOXAPARIN SODIUM 100 MG/ML
1 INJECTION SUBCUTANEOUS EVERY 24 HOURS
Status: DISCONTINUED | OUTPATIENT
Start: 2024-06-06 | End: 2024-06-11

## 2024-06-06 RX ORDER — LABETALOL HYDROCHLORIDE 5 MG/ML
10 INJECTION, SOLUTION INTRAVENOUS EVERY 4 HOURS PRN
Status: DISCONTINUED | OUTPATIENT
Start: 2024-06-06 | End: 2024-06-11 | Stop reason: HOSPADM

## 2024-06-06 RX ADMIN — CEFTRIAXONE SODIUM 2 G: 2 INJECTION, POWDER, FOR SOLUTION INTRAMUSCULAR; INTRAVENOUS at 11:06

## 2024-06-06 RX ADMIN — IOPAMIDOL 100 ML: 755 INJECTION, SOLUTION INTRAVENOUS at 10:06

## 2024-06-06 RX ADMIN — LABETALOL HYDROCHLORIDE 10 MG: 5 INJECTION, SOLUTION INTRAVENOUS at 11:06

## 2024-06-06 RX ADMIN — ENOXAPARIN SODIUM 50 MG: 60 INJECTION SUBCUTANEOUS at 11:06

## 2024-06-06 RX ADMIN — SODIUM CHLORIDE 1000 ML: 9 INJECTION, SOLUTION INTRAVENOUS at 10:06

## 2024-06-06 RX ADMIN — AMLODIPINE BESYLATE 5 MG: 5 TABLET ORAL at 09:06

## 2024-06-06 NOTE — Clinical Note
Diagnosis: Lingular pneumonia [205632]   Future Attending Provider: VIPUL BHATTI [[id number]]   Admit to which facility:: [facility] [[id number]]   Reason for IP Medical Treatment  (Clinical interventions that can only be accomplished in the IP setting? ) :: pe, pneumonia   I certify that Inpatient services for greater than or equal to 2 midnights are medically necessary:: Yes   Plans for Post-Acute care--if anticipated (pick the single best option):: A. No post acute care anticipated at this time

## 2024-06-07 PROBLEM — J18.9 LINGULAR PNEUMONIA: Status: ACTIVE | Noted: 2024-06-07

## 2024-06-07 LAB
ALBUMIN SERPL-MCNC: 2.7 G/DL (ref 3.4–4.8)
ALBUMIN/GLOB SERPL: 0.9 RATIO (ref 1.1–2)
ALP SERPL-CCNC: 85 UNIT/L (ref 40–150)
ALT SERPL-CCNC: 26 UNIT/L (ref 0–55)
ANION GAP SERPL CALC-SCNC: 10 MEQ/L
AST SERPL-CCNC: 22 UNIT/L (ref 5–34)
BASOPHILS # BLD AUTO: 0.02 X10(3)/MCL
BASOPHILS NFR BLD AUTO: 0.4 %
BILIRUB SERPL-MCNC: 0.2 MG/DL
BUN SERPL-MCNC: 20 MG/DL (ref 9.8–20.1)
CALCIUM SERPL-MCNC: 8.5 MG/DL (ref 8.4–10.2)
CHLORIDE SERPL-SCNC: 107 MMOL/L (ref 98–107)
CO2 SERPL-SCNC: 21 MMOL/L (ref 23–31)
CREAT SERPL-MCNC: 0.84 MG/DL (ref 0.55–1.02)
CREAT/UREA NIT SERPL: 24
EOSINOPHIL # BLD AUTO: 0.1 X10(3)/MCL (ref 0–0.9)
EOSINOPHIL NFR BLD AUTO: 2 %
ERYTHROCYTE [DISTWIDTH] IN BLOOD BY AUTOMATED COUNT: 12.3 % (ref 11.5–17)
GFR SERPLBLD CREATININE-BSD FMLA CKD-EPI: >60 ML/MIN/1.73/M2
GLOBULIN SER-MCNC: 3.1 GM/DL (ref 2.4–3.5)
GLUCOSE SERPL-MCNC: 84 MG/DL (ref 82–115)
HCT VFR BLD AUTO: 33.3 % (ref 37–47)
HGB BLD-MCNC: 11.2 G/DL (ref 12–16)
IMM GRANULOCYTES # BLD AUTO: 0.01 X10(3)/MCL (ref 0–0.04)
IMM GRANULOCYTES NFR BLD AUTO: 0.2 %
LYMPHOCYTES # BLD AUTO: 0.78 X10(3)/MCL (ref 0.6–4.6)
LYMPHOCYTES NFR BLD AUTO: 15.8 %
MCH RBC QN AUTO: 32.1 PG (ref 27–31)
MCHC RBC AUTO-ENTMCNC: 33.6 G/DL (ref 33–36)
MCV RBC AUTO: 95.4 FL (ref 80–94)
MONOCYTES # BLD AUTO: 0.5 X10(3)/MCL (ref 0.1–1.3)
MONOCYTES NFR BLD AUTO: 10.1 %
NEUTROPHILS # BLD AUTO: 3.54 X10(3)/MCL (ref 2.1–9.2)
NEUTROPHILS NFR BLD AUTO: 71.5 %
NRBC BLD AUTO-RTO: 0 %
PLATELET # BLD AUTO: 215 X10(3)/MCL (ref 130–400)
PMV BLD AUTO: 9.9 FL (ref 7.4–10.4)
POTASSIUM SERPL-SCNC: 3.4 MMOL/L (ref 3.5–5.1)
PROT SERPL-MCNC: 5.8 GM/DL (ref 5.8–7.6)
RBC # BLD AUTO: 3.49 X10(6)/MCL (ref 4.2–5.4)
SODIUM SERPL-SCNC: 138 MMOL/L (ref 136–145)
WBC # SPEC AUTO: 4.95 X10(3)/MCL (ref 4.5–11.5)

## 2024-06-07 PROCEDURE — 63600175 PHARM REV CODE 636 W HCPCS: Performed by: EMERGENCY MEDICINE

## 2024-06-07 PROCEDURE — 21400001 HC TELEMETRY ROOM

## 2024-06-07 PROCEDURE — 99223 1ST HOSP IP/OBS HIGH 75: CPT | Mod: AI,,, | Performed by: INTERNAL MEDICINE

## 2024-06-07 PROCEDURE — 25000003 PHARM REV CODE 250: Performed by: EMERGENCY MEDICINE

## 2024-06-07 PROCEDURE — 85025 COMPLETE CBC W/AUTO DIFF WBC: CPT | Performed by: EMERGENCY MEDICINE

## 2024-06-07 PROCEDURE — 11000001 HC ACUTE MED/SURG PRIVATE ROOM

## 2024-06-07 PROCEDURE — 25000003 PHARM REV CODE 250: Performed by: INTERNAL MEDICINE

## 2024-06-07 PROCEDURE — 80053 COMPREHEN METABOLIC PANEL: CPT | Performed by: EMERGENCY MEDICINE

## 2024-06-07 RX ORDER — AMLODIPINE BESYLATE 5 MG/1
5 TABLET ORAL 2 TIMES DAILY
Status: DISCONTINUED | OUTPATIENT
Start: 2024-06-07 | End: 2024-06-11 | Stop reason: HOSPADM

## 2024-06-07 RX ORDER — METOPROLOL SUCCINATE 50 MG/1
50 TABLET, EXTENDED RELEASE ORAL DAILY
Status: DISCONTINUED | OUTPATIENT
Start: 2024-06-07 | End: 2024-06-11 | Stop reason: HOSPADM

## 2024-06-07 RX ORDER — BRIMONIDINE TARTRATE 1.5 MG/ML
1 SOLUTION/ DROPS OPHTHALMIC 3 TIMES DAILY
Status: COMPLETED | OUTPATIENT
Start: 2024-06-07 | End: 2024-06-11

## 2024-06-07 RX ORDER — HYDROXYCHLOROQUINE SULFATE 200 MG/1
200 TABLET, FILM COATED ORAL DAILY
Status: DISCONTINUED | OUTPATIENT
Start: 2024-06-07 | End: 2024-06-11 | Stop reason: HOSPADM

## 2024-06-07 RX ORDER — POTASSIUM CHLORIDE 750 MG/1
10 TABLET, EXTENDED RELEASE ORAL DAILY
Status: DISCONTINUED | OUTPATIENT
Start: 2024-06-07 | End: 2024-06-11 | Stop reason: HOSPADM

## 2024-06-07 RX ORDER — BRIMONIDINE TARTRATE 2 MG/ML
1 SOLUTION/ DROPS OPHTHALMIC 3 TIMES DAILY
Status: DISCONTINUED | OUTPATIENT
Start: 2024-06-07 | End: 2024-06-07

## 2024-06-07 RX ORDER — GABAPENTIN 300 MG/1
300 CAPSULE ORAL NIGHTLY
Status: DISCONTINUED | OUTPATIENT
Start: 2024-06-07 | End: 2024-06-11 | Stop reason: HOSPADM

## 2024-06-07 RX ADMIN — FAMOTIDINE 20 MG: 20 TABLET, FILM COATED ORAL at 08:06

## 2024-06-07 RX ADMIN — BRIMONIDINE TARTRATE 1 DROP: 1.5 SOLUTION OPHTHALMIC at 08:06

## 2024-06-07 RX ADMIN — POLYETHYLENE GLYCOL 3350 17 G: 17 POWDER, FOR SOLUTION ORAL at 08:06

## 2024-06-07 RX ADMIN — Medication 6 MG: at 08:06

## 2024-06-07 RX ADMIN — AMLODIPINE BESYLATE 5 MG: 5 TABLET ORAL at 11:06

## 2024-06-07 RX ADMIN — METOPROLOL SUCCINATE 50 MG: 50 TABLET, EXTENDED RELEASE ORAL at 11:06

## 2024-06-07 RX ADMIN — GABAPENTIN 300 MG: 300 CAPSULE ORAL at 08:06

## 2024-06-07 RX ADMIN — BRIMONIDINE TARTRATE 1 DROP: 1.5 SOLUTION OPHTHALMIC at 04:06

## 2024-06-07 RX ADMIN — POTASSIUM CHLORIDE 10 MEQ: 750 TABLET, FILM COATED, EXTENDED RELEASE ORAL at 11:06

## 2024-06-07 RX ADMIN — AZITHROMYCIN MONOHYDRATE 500 MG: 500 INJECTION, POWDER, LYOPHILIZED, FOR SOLUTION INTRAVENOUS at 12:06

## 2024-06-07 RX ADMIN — AMLODIPINE BESYLATE 5 MG: 5 TABLET ORAL at 08:06

## 2024-06-07 RX ADMIN — HYDROXYCHLOROQUINE SULFATE 200 MG: 200 TABLET ORAL at 11:06

## 2024-06-07 NOTE — NURSING
Nurses Note -- 4 Eyes      6/7/2024   7:15 AM      Skin assessed during: Admit      [x] No Altered Skin Integrity Present    [x]Prevention Measures Documented      [] Yes- Altered Skin Integrity Present or Discovered   [] LDA Added if Not in Epic (Describe Wound)   [] New Altered Skin Integrity was Present on Admit and Documented in LDA   [] Wound Image Taken    Wound Care Consulted? No    Attending Nurse: leena tripathi    Second RN/Staff Member:   donna tripathi

## 2024-06-07 NOTE — PLAN OF CARE
06/07/24 1031   Discharge Assessment   Assessment Type Discharge Planning Assessment   Confirmed/corrected address, phone number and insurance Yes   Confirmed Demographics Correct on Facesheet   Source of Information patient   When was your last doctors appointment?   (Reports Dr Topete has been her PCP for 30 yrs)   Does patient/caregiver understand observation status Yes   Communicated HERLINDA with patient/caregiver Yes   Reason For Admission pneumonia, dehydration   People in Home alone   Facility Arrived From: home   Do you expect to return to your current living situation? Yes   Do you have help at home or someone to help you manage your care at home? Yes   Who are your caregiver(s) and their phone number(s)? family   Prior to hospitilization cognitive status: Alert/Oriented   Current cognitive status: Alert/Oriented   Walking or Climbing Stairs Difficulty yes   Walking or Climbing Stairs ambulation difficulty, requires equipment   Mobility Management reports has several walkers including standard walker   Dressing/Bathing Difficulty no   Home Layout Able to live on 1st floor  (5 steps to enter home)   Equipment Currently Used at Home walker, rolling   Patient currently being followed by outpatient case management? No   Do you currently have service(s) that help you manage your care at home? No   Do you take prescription medications? Yes   Do you have any problems affording any of your prescribed medications? No   Is the patient taking medications as prescribed? yes   Who is going to help you get home at discharge? family   How do you get to doctors appointments? car, drives self   Are you on dialysis? No   Do you take coumadin? No   Discharge Plan A Home   Discharge Plan B Home Health   Discharge Plan discussed with: Patient   Transition of Care Barriers None   Housing Stability   In the last 12 months, was there a time when you were not able to pay the mortgage or rent on time? N   At any time in the past 12  months, were you homeless or living in a shelter (including now)? N   Transportation Needs   Has the lack of transportation kept you from medical appointments, meetings, work or from getting things needed for daily living? No   Food Insecurity   Within the past 12 months, you worried that your food would run out before you got the money to buy more. Never true   Within the past 12 months, the food you bought just didn't last and you didn't have money to get more. Never true     Pt reports that she lives alone. She has not used HH services . She does drive and uses rolling walker. Other needs TBD.

## 2024-06-07 NOTE — ED PROVIDER NOTES
Encounter Date: 6/6/2024    SCRIBE #1 NOTE: I, Le Junaid, am scribing for, and in the presence of,  Nithya Su MD. I have scribed the following portions of the note - Other sections scribed: HPI, ROS, PE.       History     Chief Complaint   Patient presents with    Shoulder Pain     Presents via [facility] with c/o L shoulder pain that radiates to the breast. Exacerbated by deep breathing. GCS 15.      Patient is an 88-year-old female with a history of HTN and DDD presenting to the ED with c/o left shoulder pain. The pt reports intermittent left shoulder pain radiating to the back and the chest onset 2 days ago. Associated symptoms include nonproductive cough and shortness of breath onset with the episodes. She denies any fever, decreased appetite, dyspnea on exertion, nausea, or vomiting.     The history is provided by the patient. No  was used.     Review of patient's allergies indicates:   Allergen Reactions    Celebrex [celecoxib] Anaphylaxis    Gabapentin     Sulfamethoxazole      Past Medical History:   Diagnosis Date    History of degenerative disc disease     Scoliosis      Past Surgical History:   Procedure Laterality Date    HIP SURGERY      right hip replacement    KNEE SURGERY      left knee replacement     No family history on file.  Social History     Tobacco Use    Smoking status: Never    Smokeless tobacco: Never     Review of Systems   Constitutional:  Negative for fever.   Respiratory:  Positive for cough (nonproductive) and shortness of breath.    Gastrointestinal:  Negative for nausea and vomiting.   All other systems reviewed and are negative.      Physical Exam     Initial Vitals [06/06/24 1639]   BP Pulse Resp Temp SpO2   (!) 166/82 (!) 115 17 98.4 °F (36.9 °C) 95 %      MAP       --         Physical Exam    Nursing note and vitals reviewed.  Constitutional: She appears well-developed and well-nourished. She is not diaphoretic. No distress.   HENT:   Head: Normocephalic  and atraumatic.   Nose: Nose normal.   Mouth/Throat: Oropharynx is clear and moist.   Eyes: Conjunctivae and EOM are normal. Pupils are equal, round, and reactive to light.   Neck: Trachea normal. Neck supple.   Normal range of motion.  Cardiovascular:  Regular rhythm, normal heart sounds and intact distal pulses.   Tachycardia present.         No murmur heard.  Pulmonary/Chest: No respiratory distress. She has no wheezes. She has no rhonchi. She has rales. She exhibits no tenderness.   Crackles in the left lower lobe.    Abdominal: Abdomen is soft. Bowel sounds are normal. She exhibits no distension and no mass. There is no abdominal tenderness. There is no rebound and no guarding.   Musculoskeletal:         General: No tenderness or edema. Normal range of motion.      Cervical back: Normal range of motion and neck supple.      Lumbar back: Normal. Normal range of motion.     Neurological: She is alert and oriented to person, place, and time. She has normal strength. No cranial nerve deficit or sensory deficit.   Skin: Skin is warm and dry. Capillary refill takes less than 2 seconds. No abscess noted. No erythema. No pallor.   Psychiatric: She has a normal mood and affect. Her behavior is normal. Judgment and thought content normal.         ED Course   Critical Care    Date/Time: 6/6/2024 10:56 PM    Performed by: Nithya Su MD  Authorized by: Nithya Su MD  Direct patient critical care time: 45 minutes  Total critical care time (exclusive of procedural time) : 45 minutes  Critical care was necessary to treat or prevent imminent or life-threatening deterioration of the following conditions: respiratory failure.  Critical care was time spent personally by me on the following activities: development of treatment plan with patient or surrogate, discussions with consultants, evaluation of patient's response to treatment, examination of patient, obtaining history from patient or surrogate, ordering and  performing treatments and interventions, ordering and review of laboratory studies, ordering and review of radiographic studies, re-evaluation of patient's condition, pulse oximetry and review of old charts.        Labs Reviewed   COMPREHENSIVE METABOLIC PANEL - Abnormal; Notable for the following components:       Result Value    CO2 22 (*)     Glucose 122 (*)     Blood Urea Nitrogen 22.8 (*)     Creatinine 1.07 (*)     Protein Total 8.0 (*)     Albumin 3.3 (*)     Globulin 4.7 (*)     Albumin/Globulin Ratio 0.7 (*)     All other components within normal limits   CBC WITH DIFFERENTIAL - Abnormal; Notable for the following components:    RBC 4.07 (*)     MCV 94.6 (*)     MCH 32.2 (*)     Lymph # 0.57 (*)     All other components within normal limits   TROPONIN I - Normal   LACTIC ACID, PLASMA - Normal   BLOOD CULTURE OLG   BLOOD CULTURE OLG   CBC W/ AUTO DIFFERENTIAL    Narrative:     The following orders were created for panel order CBC Auto Differential.  Procedure                               Abnormality         Status                     ---------                               -----------         ------                     CBC with Differential[[id number]]       Abnormal            Final result                 Please view results for these tests on the individual orders.        ECG Results              EKG 12-lead (Final result)        Collection Time Result Time QRS Duration OHS QTC Calculation    06/06/24 16:37:23 06/06/24 18:25:33 76 458                     Final result by Interface, Lab In [facility] (06/06/24 18:25:41)                   Narrative:    Test Reason : M25.519,    Vent. Rate : 108 BPM     Atrial Rate : 108 BPM     P-R Int : 134 ms          QRS Dur : 076 ms      QT Int : 342 ms       P-R-T Axes : 037 040 050 degrees     QTc Int : 458 ms    Sinus tachycardia  Anterior infarct ,age undetermined  Abnormal ECG  No previous ECGs available  Confirmed by Carla LEE, Dorian ([id number]) on 6/6/2024 6:25:32  PM    Referred By:             Confirmed By:Dorian Aguirre MD                                  Imaging Results              CTA Chest Non-Coronary (PE Studies) (Edited Result - FINAL)  Result time 06/06/24 22:35:46      Addendum (preliminary) 1 of 1 by Iglesia Ponce MD (06/06/24 22:35:46)      Upon 2nd review questionable subsegmental eccentric filling defects are identified predominately in the right lower lobe these may represent chronic emboli.  No evidence for heart strain.      Electronically signed by: Iglesia Ponce MD  Date:    06/06/2024  Time:    22:35                 Final result by Iglesia Ponce MD (06/06/24 22:06:40)                   Impression:      1. No evidence for pulmonary embolism.  2. Concern for developing consolidation in the lingula.  3. Other secondary findings as noted including compressive atelectatic changes left lung base with moderate effusion.      Electronically signed by: Iglesia Ponce MD  Date:    06/06/2024  Time:    22:06               Narrative:    EXAMINATION:  CTA CHEST NON CORONARY (PE STUDIES)    CLINICAL HISTORY:  Pulmonary embolism (PE) suspected, high prob;    TECHNIQUE:  Multiple cross-section of the obtained from the thoracic inlet to the upper abdomen after the intravenous administration of 100 mL of Omnipaque 350.  Coronal and sagittal reformatted images were obtained.  An automated dose exposure technique was utilized this limits radiation does the patient.  MIP images were obtained.    COMPARISON:  None    FINDINGS:  Heart size is enlarged with coronary artery calcifications.  No reflux of contrast in the IVC.  No shift of the interventricular septum.  Course and caliber of the thoracic aorta is normal.  A triple vessel aortic arch is identified the great vessels being widely patent.  The main pulmonary artery is dilated.  Adequate opacification is noted.  No evidence for central or distal filling defect.  Of the no evidence for adenopathy.    Compressive atelectatic  changes are identified of the left lower lobe with associated moderate effusion.  No evidence for pulmonary infarct.  Concern for consolidation within the left lung base adjacent to the pericardium image number 83 of series number 13 with developing air bronchograms.  Trace effusion on the right.  No pleural thickening.  The trachea and airways are patent.    The visualized hollow and solid viscera of the upper abdomen demonstrates hepatic steatosis.  Nodular appearance of the adrenal glands.    No suspicious osseous lesions.  Spondylotic changes are identified.  Soft tissues are grossly normal..                                       X-Ray Chest PA And Lateral (Final result)  Result time 06/06/24 17:49:01      Final result by Ramesh Prieto MD (06/06/24 17:49:01)                   Narrative:    EXAMINATION  XR CHEST PA AND LATERAL    CLINICAL HISTORY  chest pain;    TECHNIQUE  A total of 2 images submitted of the chest.    COMPARISON  15 February 2020    FINDINGS  Lines/tubes/devices: none present    The cardiomediastinal silhouette and central pulmonary vasculature are unremarkable contour and size.  The trachea is midline.  Left basilar opacity is present, as well as blunting of the costophrenic angle.  Remaining lung zones are clear.  There is no convincing pneumothorax.    There is no acute osseous or extrathoracic abnormality.    IMPRESSION  Left basilar infiltrate with small pleural effusion.      Electronically signed by: Ramesh Prieto  Date:    06/06/2024  Time:    17:49                                  X-Rays:   Independently Interpreted Readings:   Chest X-Ray: There is an infiltrate in the LLL.     Medications   sodium chloride 0.9% bolus 1,000 mL 1,000 mL (1,000 mLs Intravenous New Bag 6/6/24 7880)   cefTRIAXone (ROCEPHIN) 2 g in dextrose 5 % in water (D5W) 100 mL IVPB (MB+) (has no administration in time range)   azithromycin 500 mg in dextrose 5 % 250 mL IVPB (ready to mix) (has no administration in  time range)   sodium chloride 0.9% flush 10 mL (has no administration in time range)   melatonin tablet 6 mg (has no administration in time range)   acetaminophen tablet 650 mg (has no administration in time range)   polyethylene glycol packet 17 g (has no administration in time range)   famotidine tablet 20 mg (has no administration in time range)   ondansetron injection 4 mg (has no administration in time range)   labetaloL injection 10 mg (10 mg Intravenous Given 6/6/24 2313)   enoxaparin injection 50 mg (has no administration in time range)   amLODIPine tablet 5 mg (5 mg Oral Given 6/6/24 2142)   iopamidoL (ISOVUE-370) injection 100 mL (100 mLs Intravenous Given 6/6/24 2200)     Medical Decision Making  Differential diagnosis include but are not limited to: pneumonia, PE, ACS, and atypical chest pain.  Cbc, cmp, lactic, cultures, trop, EKG, cxr, cta ordereda nd reviewed  Elevated bun and cr likely due to decreased oral intake, remainder of labs unremarkable  Imaging with lingular pneumonia, small pe  Full dose lovenox  Iv abx, admit    Problems Addressed:  Dehydration: acute illness or injury that poses a threat to life or bodily functions  Lingular pneumonia: acute illness or injury that poses a threat to life or bodily functions  Multiple subsegmental pulmonary emboli without acute cor pulmonale: acute illness or injury that poses a threat to life or bodily functions  Pleuritic chest pain: acute illness or injury that poses a threat to life or bodily functions    Amount and/or Complexity of Data Reviewed  External Data Reviewed: notes.     Details: Outpt visits noncontributory  Labs: ordered. Decision-making details documented in ED Course.  Radiology: ordered and independent interpretation performed. Decision-making details documented in ED Course.  ECG/medicine tests: ordered and independent interpretation performed. Decision-making details documented in ED Course.    Risk  OTC drugs.  Prescription drug  management.  Decision regarding hospitalization.    Critical Care  Total time providing critical care: 45 minutes            Scribe Attestation:   Scribe #1: I performed the above scribed service and the documentation accurately describes the services I performed. I attest to the accuracy of the note.  Comments: Attending:   Physician Attestation Statement for Scribe #1: INithya MD, personally performed the services described in this documentation. All medical record entries made by the scribe were at my direction and in my presence.  I have reviewed the chart and agree that the record reflects my personal performance and is accurate and complete.        Attending Attestation:           Physician Attestation for Scribe:  Physician Attestation Statement for Scribe #1: INithya MD, reviewed documentation, as scribed by [place] in my presence, and it is both accurate and complete.             ED Course as of 06/06/24 2325   Thu Jun 06, 2024 2133 Curb 65 of 2 [BS]   2141 EKG performed at 4:37 p.m. rate of 108 sinus tachycardia with poor R-wave progression [BS]   2217 Will discuss with pcp [BS]   2218 Family reported that she had some intermittent nausea and vomiting [BS]   2223 Discussed with dr light who accepts obs admit [BS]   2230 After ct was read by in house radiology, [facility] radiologist called reporting bilateral PE on their read [BS]   2235 Called Dr. Ponce, he reviewed the study again, says he sees small subsegmental PE, definitely no right heart strain, the appearance is eccentric therefore could potentially be chronic but it is unclear because the timing of the contrast was not ideal for distal review. Will addend his read [BS]      ED Course User Index  [BS] Nithya Su MD                             Clinical Impression:  Final diagnoses:  [J18.9] Lingular pneumonia  [I26.94] Multiple subsegmental pulmonary emboli without acute cor pulmonale (Primary)  [R07.81]  Pleuritic chest pain  [E86.0] Dehydration          ED Disposition Condition    Admit Stable                Nithya Su MD  06/06/24 8352

## 2024-06-07 NOTE — CLINICAL REVIEW
PA Review       In Progress   Last updated by Wilfrido Courtney MD on 6/7/2024 1055     Review Status Created By   In Primary Wilfrido Courtney MD      Criteria Review   88-year-old female admitted on 06/06/2024 to internal medicine service with a past medical history of rheumatoid arthritis, hypertension, severe peripheral neuropathy.  Reported left-sided chest pain for the last 24 hours.  Not related to exertion.  Denied any fevers chills or cough.  No dyspnea.  In the emergency room CT of the chest was performed which showed chronic left pulmonary embolus in questionable lingular pneumonia.  She was no leukocytosis or fevers.  Vital signs are stable.  No signs of sepsis.  Of note she was on Plaquenil as an outpatient for her RA she was not significantly hypoxemic but did have some tachycardia and tachypnea up to 28 breaths per minute.  As the patient was can be considered immunocompromise secondary to autoimmune disease treatment and currently requiring IV antibiotics recommend continuation of inpatient level of care    Wilfrido Courtney MD  Physician Advisor, [facility]

## 2024-06-07 NOTE — PROGRESS NOTES
HISTORY & PHYSICAL  Hospital Medicine     Patient Name: Danae Mora  YOB: 1935    Referring Physician: Jacoby Topete MD    Admit Date: 6/6/2024                     LOS: 1          PRESENTING HISTORY       History of Present Illness:  88-year-old female history rheumatoid arthritis, hypertension, severe neuropathy, refers with the last 2-1/2 was experiencing left-sided chest pain not associated with exertion, no fever no chills no sputum production.  For medical advised attempted to drive to the hospital realize she was too weak.  They activated 911 she brought to emergency room.  The emergency room patient has been thoroughly evaluated they did a CTA shows a chronic embolic the left side, and questionable lingular pneumonia reason she has been admitted.  Again no fever no chills no shortness a breath no sputum production, just stabbing pain on and off on the left side on a patient who has rheumatoid arthritis and thoracic and lumbar osteoarthritis with referred pain   Of the lengthy conversation with the patient will go ahead and resume home medications start her on antibiotics, consult Physical therapy.    Review of Systems:   Stabbing pain in the left side, for 2-1/2 days all day long not exertional, again no associated with sputum production fever or chills or hemoptysis most recent fall a month ago when she was cutting some branches in the backyard no major trauma denies nausea vomiting diarrhea constipation the rest of the review of systems essentially negative no dysuria urgency frequency    PAST HISTORY:     Past Medical History:   Diagnosis Date    History of degenerative disc disease     Scoliosis        Past Surgical History:   Procedure Laterality Date    HIP SURGERY      right hip replacement    KNEE SURGERY      left knee replacement       No family history on file.    Social History     Socioeconomic History    Marital status:    Tobacco Use    Smoking status: Never     "Smokeless tobacco: Never       MEDICATIONS & ALLERGIES:     No current facility-administered medications on file prior to encounter.     Current Outpatient Medications on File Prior to Encounter   Medication Sig Dispense Refill    amLODIPine (NORVASC) 5 MG tablet Take 1 tablet (5 mg total) by mouth 2 (two) times a day. 180 tablet 3    brimonidine 0.2% (ALPHAGAN) 0.2 % Drop Place into both eyes.      gabapentin (NEURONTIN) 300 MG capsule TAKE 1 CAPSULE EVERY       EVENING. 90 capsule 3    hydroxychloroquine (PLAQUENIL) 200 mg tablet Take 1 tablet (200 mg total) by mouth once daily. 90 tablet 3    metoprolol succinate (TOPROL-XL) 50 MG 24 hr tablet Take 1 tablet (50 mg total) by mouth once daily. 90 tablet 3    UNABLE TO FIND Diclofenac Na/ Lidocaine HCl 0.925/0.3% Gel (w/w) [98042]      chlorpheniramine-pseudoephedrine-acetaminophen (SINUTAB) 2- mg per tablet Take 500 mg by mouth.          Review of patient's allergies indicates:   Allergen Reactions    Celebrex [celecoxib] Anaphylaxis    Gabapentin     Sulfamethoxazole        OBJECTIVE:     Vital Signs:  Temp:  [97.6 °F (36.4 °C)-98.4 °F (36.9 °C)] 97.6 °F (36.4 °C)  Pulse:  [] 88  Resp:  [16-28] 16  SpO2:  [94 %-97 %] 96 %  BP: (112-197)/(58-88) 133/70  Body mass index is 22.46 kg/m².     Physical Exam:  Patient alert very talkative but slow   HEENT normocephalic atraumatic clear speech oral mucosa is dry neck is supple   Heart faint murmur 2/6   Lungs essentially clear bilateral no wheezing rales or rhonchi   Abdomen benign  Extremities arthritic changes no clubbing cyanosis or edema    Laboratory  Lab Results   Component Value Date    WBC 4.95 06/07/2024    HGB 11.2 (L) 06/07/2024    HCT 33.3 (L) 06/07/2024    MCV 95.4 (H) 06/07/2024     06/07/2024     Recent Labs   Lab 06/07/24  0457      K 3.4*      CO2 21*   BUN 20.0   CREATININE 0.84   CALCIUM 8.5     No results found for: "INR", "PROTIME"  Lab Results   Component Value Date    " "HGBA1C 4.8 10/16/2023     No results for input(s): "POCTGLUCOSE" in the last 72 hours.    Diagnostic Results:  Labs: Reviewed  ECG: Reviewed  X-Ray: Reviewed  CT: Reviewed  All findings discussed with the patient    ASSESSMENT & PLAN:     List of  Current Problems:  Active Hospital Problems    Diagnosis  POA    *Lingular pneumonia [J18.9]  Unknown      Resolved Hospital Problems   No resolved problems to display.       Active Problems Addressed by Hospital Medicine Today:        Recommendations:  IV Rocephin, physical therapy   Resume home medication   Lovenox subQ    Signing Physician:  Jacoby Verdugo MD   "

## 2024-06-08 LAB
ANION GAP SERPL CALC-SCNC: 8 MEQ/L
BUN SERPL-MCNC: 20.3 MG/DL (ref 9.8–20.1)
CALCIUM SERPL-MCNC: 8.6 MG/DL (ref 8.4–10.2)
CHLORIDE SERPL-SCNC: 110 MMOL/L (ref 98–107)
CO2 SERPL-SCNC: 21 MMOL/L (ref 23–31)
CREAT SERPL-MCNC: 0.95 MG/DL (ref 0.55–1.02)
CREAT/UREA NIT SERPL: 21
GFR SERPLBLD CREATININE-BSD FMLA CKD-EPI: 58 ML/MIN/1.73/M2
GLUCOSE SERPL-MCNC: 110 MG/DL (ref 82–115)
POTASSIUM SERPL-SCNC: 3.9 MMOL/L (ref 3.5–5.1)
SODIUM SERPL-SCNC: 139 MMOL/L (ref 136–145)

## 2024-06-08 PROCEDURE — 80048 BASIC METABOLIC PNL TOTAL CA: CPT | Performed by: INTERNAL MEDICINE

## 2024-06-08 PROCEDURE — 25000003 PHARM REV CODE 250: Performed by: INTERNAL MEDICINE

## 2024-06-08 PROCEDURE — 99233 SBSQ HOSP IP/OBS HIGH 50: CPT | Mod: ,,, | Performed by: INTERNAL MEDICINE

## 2024-06-08 PROCEDURE — 36415 COLL VENOUS BLD VENIPUNCTURE: CPT | Performed by: INTERNAL MEDICINE

## 2024-06-08 PROCEDURE — 11000001 HC ACUTE MED/SURG PRIVATE ROOM

## 2024-06-08 PROCEDURE — 97162 PT EVAL MOD COMPLEX 30 MIN: CPT

## 2024-06-08 PROCEDURE — 63600175 PHARM REV CODE 636 W HCPCS: Performed by: INTERNAL MEDICINE

## 2024-06-08 PROCEDURE — 21400001 HC TELEMETRY ROOM

## 2024-06-08 RX ADMIN — Medication 6 MG: at 09:06

## 2024-06-08 RX ADMIN — AMLODIPINE BESYLATE 5 MG: 5 TABLET ORAL at 08:06

## 2024-06-08 RX ADMIN — HYDROXYCHLOROQUINE SULFATE 200 MG: 200 TABLET ORAL at 08:06

## 2024-06-08 RX ADMIN — BRIMONIDINE TARTRATE 1 DROP: 1.5 SOLUTION OPHTHALMIC at 09:06

## 2024-06-08 RX ADMIN — FAMOTIDINE 20 MG: 20 TABLET, FILM COATED ORAL at 08:06

## 2024-06-08 RX ADMIN — CEFTRIAXONE SODIUM 1 G: 1 INJECTION, POWDER, FOR SOLUTION INTRAMUSCULAR; INTRAVENOUS at 12:06

## 2024-06-08 RX ADMIN — BRIMONIDINE TARTRATE 1 DROP: 1.5 SOLUTION OPHTHALMIC at 02:06

## 2024-06-08 RX ADMIN — POTASSIUM CHLORIDE 10 MEQ: 750 TABLET, FILM COATED, EXTENDED RELEASE ORAL at 08:06

## 2024-06-08 RX ADMIN — ENOXAPARIN SODIUM 50 MG: 60 INJECTION SUBCUTANEOUS at 12:06

## 2024-06-08 RX ADMIN — AMLODIPINE BESYLATE 5 MG: 5 TABLET ORAL at 09:06

## 2024-06-08 RX ADMIN — METOPROLOL SUCCINATE 50 MG: 50 TABLET, EXTENDED RELEASE ORAL at 08:06

## 2024-06-08 RX ADMIN — BRIMONIDINE TARTRATE 1 DROP: 1.5 SOLUTION OPHTHALMIC at 08:06

## 2024-06-08 NOTE — PLAN OF CARE
Problem: Physical Therapy  Goal: Physical Therapy Goal  Description: Goals to be met by: 24     Patient will increase functional independence with mobility by performin. Sit to stand transfer with [place]  2. Gait  x 300 feet with [place] using No Assistive Device.   3. Pt to ascend/descend 5 steps with 1 hand railing and stand by assistance     Outcome: Progressing

## 2024-06-08 NOTE — PLAN OF CARE
Problem: Physical Therapy  Goal: Physical Therapy Goal  Description: Goals to be met by: 24     Patient will increase functional independence with mobility by performin. Sit to stand transfer with [place]  2. Gait  x 300 feet with [place] using No Assistive Device.     Outcome: Progressing

## 2024-06-08 NOTE — PT/OT/SLP EVAL
"Physical Therapy Evaluation    Patient Name:  Danae Mora   MRN:  [id number]    Recommendations:     Discharge therapy intensity: Low Intensity Therapy   Discharge Equipment Recommendations: walker, rolling (?)   Barriers to discharge:  medical dx, impaired mobiltiy    Assessment:     Danae Mora is a 88 y.o. female admitted with a medical diagnosis of pneumonia, dehydration, stabbing chest pain.    She presents with the following impairments/functional limitations: gait instability, impaired balance, decreased lower extremity function, impaired self care skills, impaired functional mobility.  Patient attempting to mobilize without use of AD but she appeared unsteady therefore RW introduced. Stability noted to improve when using AD. Educated on use. Appears somewhat confused during session. Will treat and progress as able to ensure safety upon dc.     Rehab Prognosis: Good; patient would benefit from acute skilled PT services to address these deficits and reach maximum level of function.    Recent Surgery: * No surgery found *      Plan:     During this hospitalization, patient would benefit from acute PT services 3 x/week to address the identified rehab impairments via gait training, therapeutic activities, therapeutic exercises and progress toward the following goals:    Plan of Care Expires:  07/08/24    Subjective     Chief Complaint: n/a  Patient/Family Comments/goals: to go home   Pain/Comfort:  Pain Rating 1: 0/10    Patients cultural, spiritual, Anglican conflicts given the current situation: no    Living Environment:  Pt lives alone in a [facility]; 10 steps to enter "5 up and 5 down" ?  Prior to admission, patients level of function was independent.   Reports hx of falls; yusra with fatigue   Equipment used at home: cane, straight.    Upon discharge, patient will have assistance from son?.    Objective:     Communicated with NSG prior to session.  Patient found HOB elevated with telemetry  upon PT " entry to room.    General Precautions: Standard, fall  Orthopedic Precautions:N/A   Braces: N/A  Respiratory Status: Room air      Exams:  Cognitive Exam:  Patient is oriented to Person, Place, Time, and Situation  RLE Strength: WFL  LLE Strength: WFL  Skin integrity: Visible skin intact      Functional Mobility:  Bed Mobility:     Supine to Sit: stand by assistance  Sit to Supine: stand by assistance  Transfers:     Sit to Stand:  contact guard assistance with no AD  Gait: pt amb about 20 ft without use of AD and demo unsteady step to pattern requiring [place]; pt then ambulates an additional 200 ft with use of RW and CGA, pt demo a step through pattern with mild instability       Treatment & Education:  Patient provided with verbal education  regarding PT role/goals/POC, fall prevention, safety awareness, and discharge/DME recommendations.  Understanding was verbalized.     Patient left HOB elevated with all lines intact and call button in reach.    GOALS:   Multidisciplinary Problems       Physical Therapy Goals          Problem: Physical Therapy    Goal Priority Disciplines Outcome Goal Variances Interventions   Physical Therapy Goal     PT, PT/OT Progressing     Description: Goals to be met by: 24     Patient will increase functional independence with mobility by performin. Sit to stand transfer with [place]  2. Gait  x 300 feet with [place] using No Assistive Device.   3. Pt to ascend/descend 5 steps with 1 hand railing and stand by assistance                          History:     Past Medical History:   Diagnosis Date    History of degenerative disc disease     Scoliosis        Past Surgical History:   Procedure Laterality Date    HIP SURGERY      right hip replacement    KNEE SURGERY      left knee replacement       Time Tracking:     PT Received On: 24  PT Start Time: 1035     PT Stop Time: 1054  PT Total Time (min): 19 min     Billable Minutes: Evaluation mod      2024

## 2024-06-08 NOTE — PROGRESS NOTES
[facility] Medicine Progress Note        Chief Complaint: Inpatient Follow-up for     HPI:   From Dr Topete: 88-year-old female history rheumatoid arthritis, hypertension, severe neuropathy, refers with the last 2-1/2 was experiencing left-sided chest pain not associated with exertion, no fever no chills no sputum production.  For medical advised attempted to drive to the hospital realize she was too weak.  They activated 911 she brought to emergency room.  The emergency room patient has been thoroughly evaluated they did a CTA shows a chronic embolic the left side, and questionable lingular pneumonia reason she has been admitted.  Again no fever no chills no shortness a breath no sputum production, just stabbing pain on and off on the left side on a patient who has rheumatoid arthritis and thoracic and lumbar osteoarthritis with referred pain   Of the lengthy conversation with the patient will go ahead and resume home medications start her on antibiotics, consult Physical therapy.      Interval Hx:   The patient was seen and examined.  Doing much better.  No acute needs or complaints as such does remain somewhat confused however.    Case was discussed with patient's nurse and  on the floor.    Objective/physical exam:  General: In no acute distress, afebrile  Chest: Clear to auscultation bilaterally  Heart: RRR, +S1, S2, no appreciable murmur  Abdomen: Soft, nontender, BS +  MSK: Warm, no lower extremity edema, no clubbing or cyanosis  Neurologic: Alert and oriented x4    VITAL SIGNS: 24 HRS MIN & MAX LAST   Temp  Min: 97.8 °F (36.6 °C)  Max: 99.5 °F (37.5 °C) 97.8 °F (36.6 °C)   BP  Min: 124/62  Max: 155/81 (!) 155/81   Pulse  Min: 68  Max: 79  75   Resp  Min: 18  Max: 19 19   SpO2  Min: 92 %  Max: 96 % 96 %     I have reviewed the following labs:  Recent Labs   Lab 06/06/24  1856 06/07/24  0457   WBC 8.16 4.95   RBC 4.07* 3.49*   HGB 13.1 11.2*   HCT 38.5 33.3*    MCV 94.6* 95.4*   MCH 32.2* 32.1*   MCHC 34.0 33.6   RDW 12.3 12.3    215   MPV 9.8 9.9     Recent Labs   Lab 06/06/24  1856 06/07/24  0457 06/08/24  0356    138 139   K 4.1 3.4* 3.9    107 110*   CO2 22* 21* 21*   BUN 22.8* 20.0 20.3*   CREATININE 1.07* 0.84 0.95   CALCIUM 9.9 8.5 8.6   ALBUMIN 3.3* 2.7*  --    ALKPHOS 107 85  --    ALT 32 26  --    AST 26 22  --    BILITOT 0.6 0.2  --      Microbiology Results (last 7 days)       Procedure Component Value Units Date/Time    Blood culture #1 **CANNOT BE ORDERED STAT** [[id number]]  (Normal) Collected: 06/06/24 2257    Order Status: Completed Specimen: Blood Updated: 06/08/24 0300     Blood Culture No Growth At 24 Hours    Blood culture #2 **CANNOT BE ORDERED STAT** [[id number]]  (Normal) Collected: 06/06/24 2244    Order Status: Completed Specimen: Blood Updated: 06/08/24 0005     Blood Culture No Growth At 24 Hours             See below for Radiology    Scheduled Med:   amLODIPine  5 mg Oral BID    brimonidine 0.15 % OPTH DROP  1 drop Both Eyes TID    cefTRIAXone (Rocephin) IV (PEDS and ADULTS)  1 g Intravenous Q24H    enoxparin  1 mg/kg Subcutaneous Q24H (treatment, non-standard time)    famotidine  20 mg Oral Daily    gabapentin  300 mg Oral QHS    hydroxychloroquine  200 mg Oral Daily    metoprolol succinate  50 mg Oral Daily    polyethylene glycol  17 g Oral Daily    potassium chloride  10 mEq Oral Daily      Continuous Infusions:     PRN Meds:    Current Facility-Administered Medications:     acetaminophen, 650 mg, Oral, Q8H PRN    labetaloL, 10 mg, Intravenous, Q4H PRN    melatonin, 6 mg, Oral, Nightly PRN    ondansetron, 4 mg, Intravenous, Q8H PRN    sodium chloride 0.9%, 10 mL, Intravenous, PRN     Assessment/Plan:    Pneumonia   -on Rocephin, continue   -continues to improve slowly     2. Physical deconditioning   Therapy services to continue to eval and treat     3. Hypertension   -home medications continued, on amlodipine and  metoprolol     VTE prophylaxis:  Lovenox    Patient condition:  Fair    Anticipated discharge and Disposition:   Awaiting placement/home with family      All diagnosis and differential diagnosis have been reviewed; assessment and plan has been documented; I have personally reviewed the labs and test results that are presently available; I have reviewed the patients medication list; I have reviewed the consulting providers response and recommendations. I have reviewed or attempted to review medical records based upon their availability    All of the patient's questions have been  addressed and answered. Patient's is agreeable to the above stated plan. I will continue to monitor closely and make adjustments to medical management as needed.  _____________________________________________________________________    Nutrition Status:    Radiology:  I have personally reviewed the following imaging and agree with the radiologist.     CTA Chest Non-Coronary (PE Studies)  Addendum: Upon 2nd review questionable subsegmental eccentric filling defects are  identified predominately in the right lower lobe these may represent  chronic emboli.  No evidence for heart strain.     Electronically signed by: Iglesia Ponce MD   Date:    06/06/2024   Time:    22:35  Narrative: EXAMINATION:  CTA CHEST NON CORONARY (PE STUDIES)    CLINICAL HISTORY:  Pulmonary embolism (PE) suspected, high prob;    TECHNIQUE:  Multiple cross-section of the obtained from the thoracic inlet to the upper abdomen after the intravenous administration of 100 mL of Omnipaque 350.  Coronal and sagittal reformatted images were obtained.  An automated dose exposure technique was utilized this limits radiation does the patient.  MIP images were obtained.    COMPARISON:  None    FINDINGS:  Heart size is enlarged with coronary artery calcifications.  No reflux of contrast in the IVC.  No shift of the interventricular septum.  Course and caliber of the thoracic aorta is  normal.  A triple vessel aortic arch is identified the great vessels being widely patent.  The main pulmonary artery is dilated.  Adequate opacification is noted.  No evidence for central or distal filling defect.  Of the no evidence for adenopathy.    Compressive atelectatic changes are identified of the left lower lobe with associated moderate effusion.  No evidence for pulmonary infarct.  Concern for consolidation within the left lung base adjacent to the pericardium image number 83 of series number 13 with developing air bronchograms.  Trace effusion on the right.  No pleural thickening.  The trachea and airways are patent.    The visualized hollow and solid viscera of the upper abdomen demonstrates hepatic steatosis.  Nodular appearance of the adrenal glands.    No suspicious osseous lesions.  Spondylotic changes are identified.  Soft tissues are grossly normal..  Impression: 1. No evidence for pulmonary embolism.  2. Concern for developing consolidation in the lingula.  3. Other secondary findings as noted including compressive atelectatic changes left lung base with moderate effusion.    Electronically signed by: Iglesia Ponce MD  Date:    06/06/2024  Time:    22:06  X-Ray Chest PA And Lateral  EXAMINATION  XR CHEST PA AND LATERAL    CLINICAL HISTORY  chest pain;    TECHNIQUE  A total of 2 images submitted of the chest.    COMPARISON  15 February 2020    FINDINGS  Lines/tubes/devices: none present    The cardiomediastinal silhouette and central pulmonary vasculature are unremarkable contour and size.  The trachea is midline.  Left basilar opacity is present, as well as blunting of the costophrenic angle.  Remaining lung zones are clear.  There is no convincing pneumothorax.    There is no acute osseous or extrathoracic abnormality.    IMPRESSION  Left basilar infiltrate with small pleural effusion.    Electronically signed by: Ramesh Prieto  Date:    06/06/2024  Time:    17:49      Vane Donnelly  MD  Department of Hospital Medicine   [facility]   06/08/2024

## 2024-06-09 PROCEDURE — 63600175 PHARM REV CODE 636 W HCPCS: Performed by: INTERNAL MEDICINE

## 2024-06-09 PROCEDURE — 25000003 PHARM REV CODE 250: Performed by: INTERNAL MEDICINE

## 2024-06-09 PROCEDURE — 21400001 HC TELEMETRY ROOM

## 2024-06-09 PROCEDURE — 99233 SBSQ HOSP IP/OBS HIGH 50: CPT | Mod: ,,, | Performed by: INTERNAL MEDICINE

## 2024-06-09 RX ADMIN — AMLODIPINE BESYLATE 5 MG: 5 TABLET ORAL at 08:06

## 2024-06-09 RX ADMIN — BRIMONIDINE TARTRATE 1 DROP: 1.5 SOLUTION OPHTHALMIC at 08:06

## 2024-06-09 RX ADMIN — ENOXAPARIN SODIUM 50 MG: 60 INJECTION SUBCUTANEOUS at 12:06

## 2024-06-09 RX ADMIN — POTASSIUM CHLORIDE 10 MEQ: 750 TABLET, FILM COATED, EXTENDED RELEASE ORAL at 08:06

## 2024-06-09 RX ADMIN — METOPROLOL SUCCINATE 50 MG: 50 TABLET, EXTENDED RELEASE ORAL at 08:06

## 2024-06-09 RX ADMIN — FAMOTIDINE 20 MG: 20 TABLET, FILM COATED ORAL at 08:06

## 2024-06-09 RX ADMIN — BRIMONIDINE TARTRATE 1 DROP: 1.5 SOLUTION OPHTHALMIC at 02:06

## 2024-06-09 RX ADMIN — HYDROXYCHLOROQUINE SULFATE 200 MG: 200 TABLET ORAL at 08:06

## 2024-06-09 RX ADMIN — CEFTRIAXONE SODIUM 1 G: 1 INJECTION, POWDER, FOR SOLUTION INTRAMUSCULAR; INTRAVENOUS at 12:06

## 2024-06-09 NOTE — PROGRESS NOTES
[facility] Medicine Progress Note        Chief Complaint: Inpatient Follow-up for     HPI:   88-year-old female history rheumatoid arthritis, hypertension, severe neuropathy, refers with the last 2-1/2 was experiencing left-sided chest pain not associated with exertion, no fever no chills no sputum production.  For medical advised attempted to drive to the hospital realize she was too weak.  They activated 911 she brought to emergency room.  The emergency room patient has been thoroughly evaluated they did a CTA shows a chronic embolic the left side, and questionable lingular pneumonia reason she has been admitted.  Again no fever no chills no shortness a breath no sputum production, just stabbing pain on and off on the left side on a patient who has rheumatoid arthritis and thoracic and lumbar osteoarthritis with referred pain   Of the lengthy conversation with the patient will go ahead and resume home medications start her on antibiotics, consult Physical therapy.    Interval Hx:   The patient was seen and examined.  Was getting confused and trying to get out of bed all night.  Had to be put on CPOE.  Bed alarm is on as well.    No other acute events overnight     Case was discussed with patient's nurse and  on the floor.    Objective/physical exam:  General: In no acute distress, afebrile  Chest: Clear to auscultation bilaterally  Heart:  S1-S2, systolic murmur   Abdomen: Soft, nontender, BS +  Neurologic:  Nonfocal, confused however    VITAL SIGNS: 24 HRS MIN & MAX LAST   Temp  Min: 97.7 °F (36.5 °C)  Max: 98.2 °F (36.8 °C) 97.7 °F (36.5 °C)   BP  Min: 101/60  Max: 147/71 (!) 147/71   Pulse  Min: 69  Max: 91  91   Resp  Min: 18  Max: 19 18   SpO2  Min: 94 %  Max: 98 % 98 %     I have reviewed the following labs:  Recent Labs   Lab 06/06/24  1856 06/07/24  0457   WBC 8.16 4.95   RBC 4.07* 3.49*   HGB 13.1 11.2*   HCT 38.5 33.3*   MCV 94.6* 95.4*   MCH 32.2*  32.1*   MCHC 34.0 33.6   RDW 12.3 12.3    215   MPV 9.8 9.9     Recent Labs   Lab 06/06/24  1856 06/07/24  0457 06/08/24  0356    138 139   K 4.1 3.4* 3.9    107 110*   CO2 22* 21* 21*   BUN 22.8* 20.0 20.3*   CREATININE 1.07* 0.84 0.95   CALCIUM 9.9 8.5 8.6   ALBUMIN 3.3* 2.7*  --    ALKPHOS 107 85  --    ALT 32 26  --    AST 26 22  --    BILITOT 0.6 0.2  --      Microbiology Results (last 7 days)       Procedure Component Value Units Date/Time    Blood culture #1 **CANNOT BE ORDERED STAT** [[id number]]  (Normal) Collected: 06/06/24 2257    Order Status: Completed Specimen: Blood Updated: 06/09/24 0300     Blood Culture No Growth At 48 Hours    Blood culture #2 **CANNOT BE ORDERED STAT** [[id number]]  (Normal) Collected: 06/06/24 2244    Order Status: Completed Specimen: Blood Updated: 06/09/24 0000     Blood Culture No Growth At 48 Hours             See below for Radiology    Scheduled Med:   amLODIPine  5 mg Oral BID    brimonidine 0.15 % OPTH DROP  1 drop Both Eyes TID    cefTRIAXone (Rocephin) IV (PEDS and ADULTS)  1 g Intravenous Q24H    enoxparin  1 mg/kg Subcutaneous Q24H (treatment, non-standard time)    famotidine  20 mg Oral Daily    gabapentin  300 mg Oral QHS    hydroxychloroquine  200 mg Oral Daily    metoprolol succinate  50 mg Oral Daily    polyethylene glycol  17 g Oral Daily    potassium chloride  10 mEq Oral Daily      Continuous Infusions:     PRN Meds:    Current Facility-Administered Medications:     acetaminophen, 650 mg, Oral, Q8H PRN    labetaloL, 10 mg, Intravenous, Q4H PRN    melatonin, 6 mg, Oral, Nightly PRN    ondansetron, 4 mg, Intravenous, Q8H PRN    sodium chloride 0.9%, 10 mL, Intravenous, PRN     Assessment/Plan:    Pneumonia   -on Rocephin, continue   -continues to improve slowly      2. Physical deconditioning   Therapy services to continue while in-house      3. Hypertension   -home medications continued, on amlodipine and metoprolol     VTE prophylaxis:   Lovenox    Patient condition:  Fair     Anticipated discharge and Disposition:   Awaiting placement versus home with family      All diagnosis and differential diagnosis have been reviewed; assessment and plan has been documented; I have personally reviewed the labs and test results that are presently available; I have reviewed the patients medication list; I have reviewed the consulting providers response and recommendations. I have reviewed or attempted to review medical records based upon their availability    All of the patient's questions have been  addressed and answered. Patient's is agreeable to the above stated plan. I will continue to monitor closely and make adjustments to medical management as needed.  _____________________________________________________________________    Nutrition Status:    Radiology:  I have personally reviewed the following imaging and agree with the radiologist.     CTA Chest Non-Coronary (PE Studies)  Addendum: Upon 2nd review questionable subsegmental eccentric filling defects are  identified predominately in the right lower lobe these may represent  chronic emboli.  No evidence for heart strain.     Electronically signed by: Iglesia Ponce MD   Date:    06/06/2024   Time:    22:35  Narrative: EXAMINATION:  CTA CHEST NON CORONARY (PE STUDIES)    CLINICAL HISTORY:  Pulmonary embolism (PE) suspected, high prob;    TECHNIQUE:  Multiple cross-section of the obtained from the thoracic inlet to the upper abdomen after the intravenous administration of 100 mL of Omnipaque 350.  Coronal and sagittal reformatted images were obtained.  An automated dose exposure technique was utilized this limits radiation does the patient.  MIP images were obtained.    COMPARISON:  None    FINDINGS:  Heart size is enlarged with coronary artery calcifications.  No reflux of contrast in the IVC.  No shift of the interventricular septum.  Course and caliber of the thoracic aorta is normal.  A triple vessel  aortic arch is identified the great vessels being widely patent.  The main pulmonary artery is dilated.  Adequate opacification is noted.  No evidence for central or distal filling defect.  Of the no evidence for adenopathy.    Compressive atelectatic changes are identified of the left lower lobe with associated moderate effusion.  No evidence for pulmonary infarct.  Concern for consolidation within the left lung base adjacent to the pericardium image number 83 of series number 13 with developing air bronchograms.  Trace effusion on the right.  No pleural thickening.  The trachea and airways are patent.    The visualized hollow and solid viscera of the upper abdomen demonstrates hepatic steatosis.  Nodular appearance of the adrenal glands.    No suspicious osseous lesions.  Spondylotic changes are identified.  Soft tissues are grossly normal..  Impression: 1. No evidence for pulmonary embolism.  2. Concern for developing consolidation in the lingula.  3. Other secondary findings as noted including compressive atelectatic changes left lung base with moderate effusion.    Electronically signed by: Iglesia Ponce MD  Date:    06/06/2024  Time:    22:06  X-Ray Chest PA And Lateral  EXAMINATION  XR CHEST PA AND LATERAL    CLINICAL HISTORY  chest pain;    TECHNIQUE  A total of 2 images submitted of the chest.    COMPARISON  15 February 2020    FINDINGS  Lines/tubes/devices: none present    The cardiomediastinal silhouette and central pulmonary vasculature are unremarkable contour and size.  The trachea is midline.  Left basilar opacity is present, as well as blunting of the costophrenic angle.  Remaining lung zones are clear.  There is no convincing pneumothorax.    There is no acute osseous or extrathoracic abnormality.    IMPRESSION  Left basilar infiltrate with small pleural effusion.    Electronically signed by: Ramesh Prieto  Date:    06/06/2024  Time:    17:49      Vane Donnelly MD  Department of Hospital  Medicine   [facility]   06/09/2024

## 2024-06-10 LAB
BACTERIA #/AREA URNS AUTO: ABNORMAL /HPF
BILIRUB UR QL STRIP.AUTO: NEGATIVE
CLARITY UR: CLEAR
COLOR UR AUTO: ABNORMAL
GLUCOSE UR QL STRIP: NORMAL
HGB UR QL STRIP: NEGATIVE
HYALINE CASTS #/AREA URNS LPF: ABNORMAL /LPF
KETONES UR QL STRIP: NEGATIVE
LEUKOCYTE ESTERASE UR QL STRIP: NEGATIVE
MUCOUS THREADS URNS QL MICRO: ABNORMAL /LPF
NITRITE UR QL STRIP: NEGATIVE
PH UR STRIP: 6 [PH]
PROT UR QL STRIP: NEGATIVE
RBC #/AREA URNS AUTO: ABNORMAL /HPF
SP GR UR STRIP.AUTO: 1.01 (ref 1–1.03)
SQUAMOUS #/AREA URNS LPF: ABNORMAL /HPF
UROBILINOGEN UR STRIP-ACNC: NORMAL
WBC #/AREA URNS AUTO: ABNORMAL /HPF

## 2024-06-10 PROCEDURE — 21400001 HC TELEMETRY ROOM

## 2024-06-10 PROCEDURE — 63600175 PHARM REV CODE 636 W HCPCS: Performed by: INTERNAL MEDICINE

## 2024-06-10 PROCEDURE — 99233 SBSQ HOSP IP/OBS HIGH 50: CPT | Mod: GT,,, | Performed by: INTERNAL MEDICINE

## 2024-06-10 PROCEDURE — 81001 URINALYSIS AUTO W/SCOPE: CPT | Performed by: INTERNAL MEDICINE

## 2024-06-10 PROCEDURE — 25000003 PHARM REV CODE 250: Performed by: INTERNAL MEDICINE

## 2024-06-10 PROCEDURE — 97116 GAIT TRAINING THERAPY: CPT

## 2024-06-10 RX ADMIN — BRIMONIDINE TARTRATE 1 DROP: 1.5 SOLUTION OPHTHALMIC at 09:06

## 2024-06-10 RX ADMIN — AMLODIPINE BESYLATE 5 MG: 5 TABLET ORAL at 08:06

## 2024-06-10 RX ADMIN — METOPROLOL SUCCINATE 50 MG: 50 TABLET, EXTENDED RELEASE ORAL at 09:06

## 2024-06-10 RX ADMIN — BRIMONIDINE TARTRATE 1 DROP: 1.5 SOLUTION OPHTHALMIC at 08:06

## 2024-06-10 RX ADMIN — BRIMONIDINE TARTRATE 1 DROP: 1.5 SOLUTION OPHTHALMIC at 02:06

## 2024-06-10 RX ADMIN — CEFTRIAXONE SODIUM 1 G: 1 INJECTION, POWDER, FOR SOLUTION INTRAMUSCULAR; INTRAVENOUS at 01:06

## 2024-06-10 RX ADMIN — POLYETHYLENE GLYCOL 3350 17 G: 17 POWDER, FOR SOLUTION ORAL at 09:06

## 2024-06-10 RX ADMIN — ENOXAPARIN SODIUM 50 MG: 60 INJECTION SUBCUTANEOUS at 01:06

## 2024-06-10 RX ADMIN — FAMOTIDINE 20 MG: 20 TABLET, FILM COATED ORAL at 09:06

## 2024-06-10 RX ADMIN — POTASSIUM CHLORIDE 10 MEQ: 750 TABLET, FILM COATED, EXTENDED RELEASE ORAL at 09:06

## 2024-06-10 RX ADMIN — AMLODIPINE BESYLATE 5 MG: 5 TABLET ORAL at 09:06

## 2024-06-10 RX ADMIN — HYDROXYCHLOROQUINE SULFATE 200 MG: 200 TABLET ORAL at 09:06

## 2024-06-10 NOTE — PLAN OF CARE
I explained to pt that she has HH recommendation , benefits of HH and FOC list provided. She informed me that she will have to discuss this with her son. I left my cell number and list with pt.

## 2024-06-10 NOTE — PT/OT/SLP PROGRESS
Physical Therapy Treatment    Patient Name:  Danae Mora   MRN:  [id number]    Recommendations:     Discharge therapy intensity: Low Intensity Therapy   Discharge Equipment Recommendations: walker, rolling (?)  Barriers to discharge: None    Assessment:     Danae Mora is a 88 y.o. female admitted with a medical diagnosis of  pneumonia, dehydration, stabbing chest pain.  .  She presents with the following impairments/functional limitations: impaired self care skills, impaired functional mobility, gait instability .    Rehab Prognosis: Good; patient would benefit from acute skilled PT services to address these deficits and reach maximum level of function.    Recent Surgery: * No surgery found *      Plan:     During this hospitalization, patient would benefit from acute PT services 3 x/week to address the identified rehab impairments via gait training, therapeutic activities, therapeutic exercises and progress toward the following goals:    Plan of Care Expires:  07/08/24    Subjective     Chief Complaint:   Patient/Family Comments/goals:   Pain/Comfort:         Objective:     Communicated with nurse prior to session.  Patient found up in chair with telemetry upon PT entry to room.     General Precautions: Standard, fall  Orthopedic Precautions: N/A  Braces: N/A  Respiratory Status: Room air  Blood Pressure:   Skin Integrity: Visible skin intact      Functional Mobility:  Transfers:     Sit to Stand:  stand by assistance with rolling walker  Bed to Chair: stand by assistance with  rolling walker  using  Step Transfer  Gait: pt ambulated 400ft with a rw sba    Therapeutic Activities/Exercises:      Education:  Patient provided with verbal education education regarding PT role/goals/POC.  Understanding was verbalized.     Patient left up in chair with all lines intact and call button in reach    GOALS:   Multidisciplinary Problems       Physical Therapy Goals          Problem: Physical Therapy    Goal  Priority Disciplines Outcome Goal Variances Interventions   Physical Therapy Goal     PT, PT/OT Progressing     Description: Goals to be met by: 24     Patient will increase functional independence with mobility by performin. Sit to stand transfer with [place]  2. Gait  x 300 feet with [place] using No Assistive Device.   3. Pt to ascend/descend 5 steps with 1 hand railing and stand by assistance                          Time Tracking:     PT Received On: 06/10/24  PT Start Time: 1400     PT Stop Time: 1415  PT Total Time (min): 15 min     Billable Minutes: Gait Training 15    Treatment Type: Treatment  PT/PTA: PT     Number of PTA visits since last PT visit: 1     06/10/2024

## 2024-06-10 NOTE — PROGRESS NOTES
"Progress Note  Hospital Medicine    Patient Name: Danae Mora  YOB: 1935    Admit Date: 6/6/2024                     LOS: 4    SUBJECTIVE:     Reason for Admission:  Lingular pneumonia  See H&P for detailed presentating history and ROS.      Interval history:  Over the weekend chart has been reviewed patient became delirious, she has no recollection of it.  She does not even recollects seen the on-call physician.  Today back to her baseline she knows she has at the [facility], she knows me by name and understand because of the nurses report that she active delirious over the weekend but again no recollection      OBJECTIVE:     Vital Signs Range (Last 24H):  Temp:  [97.9 °F (36.6 °C)-98.6 °F (37 °C)]   Pulse:  [65-91]   Resp:  [17-18]   BP: (116-177)/(57-83)   SpO2:  [95 %-98 %] Body mass index is 22.73 kg/m².  Wt Readings from Last 3 Encounters:   06/07/24 0930 52.8 kg (116 lb 6.5 oz)   06/06/24 1639 52.2 kg (115 lb)   04/29/24 1018 51.3 kg (113 lb)   12/26/23 1326 52.2 kg (115 lb)       I & O (Last 24H):No intake or output data in the 24 hours ending 06/10/24 1217    Physical Exam:  Alert oriented x3 in no obvious respiratory distress   HEENT facial muscles are symmetric neck is supple   Heart systolic murmur 2/6   Lungs essentially clear bilateral no wheezing rales or rhonchi   Abdomen benign soft and depressible   Extremities no edema    Diagnostic Results:  Lab Results   Component Value Date    WBC 4.95 06/07/2024    HGB 11.2 (L) 06/07/2024    HCT 33.3 (L) 06/07/2024    MCV 95.4 (H) 06/07/2024     06/07/2024     No results for input(s): "GLU", "NA", "K", "CL", "CO2", "BUN", "CREATININE", "CALCIUM", "MG" in the last 24 hours.  No results found for: "INR", "PROTIME"  Lab Results   Component Value Date    HGBA1C 4.8 10/16/2023     No results for input(s): "POCTGLUCOSE" in the last 72 hours.    ASSESSMENT/PLAN:     Active Hospital Problems    Diagnosis  POA    *Lingular pneumonia " [J18.9]  Unknown      Resolved Hospital Problems   No resolved problems to display.        Problems Addressed Today:    No problem-specific Assessment & Plan notes found for this encounter.        DISCHARGE PLANNING:   Continue with the established treatment, consult case management for either outpatient rehab, and or home health    Signing Physician:  Jacoby Verdugo MD

## 2024-06-11 VITALS
WEIGHT: 116.38 LBS | TEMPERATURE: 98 F | SYSTOLIC BLOOD PRESSURE: 144 MMHG | RESPIRATION RATE: 18 BRPM | BODY MASS INDEX: 22.85 KG/M2 | DIASTOLIC BLOOD PRESSURE: 81 MMHG | OXYGEN SATURATION: 95 % | HEART RATE: 74 BPM | HEIGHT: 60 IN

## 2024-06-11 DIAGNOSIS — Z86.718 HISTORY OF DVT (DEEP VEIN THROMBOSIS): Primary | ICD-10-CM

## 2024-06-11 LAB
BACTERIA BLD CULT: NORMAL
BASOPHILS # BLD AUTO: 0.04 X10(3)/MCL
BASOPHILS NFR BLD AUTO: 0.8 %
EOSINOPHIL # BLD AUTO: 0.24 X10(3)/MCL (ref 0–0.9)
EOSINOPHIL NFR BLD AUTO: 4.8 %
ERYTHROCYTE [DISTWIDTH] IN BLOOD BY AUTOMATED COUNT: 12.4 % (ref 11.5–17)
HCT VFR BLD AUTO: 33.8 % (ref 37–47)
HGB BLD-MCNC: 11.4 G/DL (ref 12–16)
IMM GRANULOCYTES # BLD AUTO: 0.03 X10(3)/MCL (ref 0–0.04)
IMM GRANULOCYTES NFR BLD AUTO: 0.6 %
LYMPHOCYTES # BLD AUTO: 1.13 X10(3)/MCL (ref 0.6–4.6)
LYMPHOCYTES NFR BLD AUTO: 22.6 %
MCH RBC QN AUTO: 31.8 PG (ref 27–31)
MCHC RBC AUTO-ENTMCNC: 33.7 G/DL (ref 33–36)
MCV RBC AUTO: 94.4 FL (ref 80–94)
MONOCYTES # BLD AUTO: 0.48 X10(3)/MCL (ref 0.1–1.3)
MONOCYTES NFR BLD AUTO: 9.6 %
NEUTROPHILS # BLD AUTO: 3.07 X10(3)/MCL (ref 2.1–9.2)
NEUTROPHILS NFR BLD AUTO: 61.6 %
NRBC BLD AUTO-RTO: 0 %
PLATELET # BLD AUTO: 245 X10(3)/MCL (ref 130–400)
PMV BLD AUTO: 9.6 FL (ref 7.4–10.4)
RBC # BLD AUTO: 3.58 X10(6)/MCL (ref 4.2–5.4)
WBC # SPEC AUTO: 4.99 X10(3)/MCL (ref 4.5–11.5)

## 2024-06-11 PROCEDURE — 25000003 PHARM REV CODE 250: Performed by: INTERNAL MEDICINE

## 2024-06-11 PROCEDURE — 85025 COMPLETE CBC W/AUTO DIFF WBC: CPT | Performed by: INTERNAL MEDICINE

## 2024-06-11 PROCEDURE — 36415 COLL VENOUS BLD VENIPUNCTURE: CPT | Performed by: INTERNAL MEDICINE

## 2024-06-11 PROCEDURE — 97116 GAIT TRAINING THERAPY: CPT | Mod: CQ

## 2024-06-11 PROCEDURE — 63600175 PHARM REV CODE 636 W HCPCS: Performed by: INTERNAL MEDICINE

## 2024-06-11 PROCEDURE — 99233 SBSQ HOSP IP/OBS HIGH 50: CPT | Mod: GT,,, | Performed by: INTERNAL MEDICINE

## 2024-06-11 PROCEDURE — 97530 THERAPEUTIC ACTIVITIES: CPT | Mod: CQ

## 2024-06-11 RX ORDER — FAMOTIDINE 20 MG/1
20 TABLET, FILM COATED ORAL DAILY
Qty: 30 TABLET | Refills: 11 | Status: SHIPPED | OUTPATIENT
Start: 2024-06-12 | End: 2024-06-17

## 2024-06-11 RX ORDER — ACETAMINOPHEN 325 MG/1
650 TABLET ORAL EVERY 8 HOURS PRN
Qty: 30 TABLET | Refills: 0 | Status: SHIPPED | OUTPATIENT
Start: 2024-06-11

## 2024-06-11 RX ORDER — POLYETHYLENE GLYCOL 3350 17 G/17G
17 POWDER, FOR SOLUTION ORAL DAILY
Qty: 20 EACH | Refills: 0 | Status: SHIPPED | OUTPATIENT
Start: 2024-06-12 | End: 2024-06-17

## 2024-06-11 RX ADMIN — POTASSIUM CHLORIDE 10 MEQ: 750 TABLET, FILM COATED, EXTENDED RELEASE ORAL at 08:06

## 2024-06-11 RX ADMIN — HYDROXYCHLOROQUINE SULFATE 200 MG: 200 TABLET ORAL at 08:06

## 2024-06-11 RX ADMIN — AMLODIPINE BESYLATE 5 MG: 5 TABLET ORAL at 08:06

## 2024-06-11 RX ADMIN — POLYETHYLENE GLYCOL 3350 17 G: 17 POWDER, FOR SOLUTION ORAL at 08:06

## 2024-06-11 RX ADMIN — BRIMONIDINE TARTRATE 1 DROP: 1.5 SOLUTION OPHTHALMIC at 08:06

## 2024-06-11 RX ADMIN — ENOXAPARIN SODIUM 50 MG: 60 INJECTION SUBCUTANEOUS at 01:06

## 2024-06-11 RX ADMIN — FAMOTIDINE 20 MG: 20 TABLET, FILM COATED ORAL at 08:06

## 2024-06-11 RX ADMIN — METOPROLOL SUCCINATE 50 MG: 50 TABLET, EXTENDED RELEASE ORAL at 08:06

## 2024-06-11 NOTE — PT/OT/SLP PROGRESS
Physical Therapy Treatment    Patient Name:  Danae Mora   MRN:  [id number]    Recommendations:     Discharge therapy intensity: Low Intensity Therapy   Discharge Equipment Recommendations: walker, rolling (?)  Barriers to discharge: None    Assessment:     Danae Mora is a 88 y.o. female admitted with a medical diagnosis of pneumonia, dehydration, stabbing chest pain.  She presents with the following impairments/functional limitations: impaired balance, impaired functional mobility, gait instability .    Rehab Prognosis: Good; patient would benefit from acute skilled PT services to address these deficits and reach maximum level of function.    Recent Surgery: * No surgery found *      Plan:     During this hospitalization, patient would benefit from acute PT services 3 x/week to address the identified rehab impairments via gait training, therapeutic activities, therapeutic exercises and progress toward the following goals:    Plan of Care Expires:  07/08/24    Subjective     Chief Complaint: none  Patient/Family Comments/goals: to go home  Pain/Comfort:  Pain Rating 1: 0/10      Objective:     Communicated with pts nurse prior to session.  Patient found up in chair with telemetry upon PT entry to room.     General Precautions: Standard, fall  Orthopedic Precautions: N/A  Braces: N/A  Respiratory Status: Room air  Blood Pressure:   Skin Integrity: Visible skin intact      Functional Mobility:  Transfers:     Sit to Stand:  supervision and stand by assistance with no AD, rolling walker, and from various surfaces working forward lean, loading legs and obtaining balance prior to reaching for support  Bed to Chair: stand by assistance and contact guard assistance with  no AD and rolling walker  using  Step Transfer and with cues to attention to task especially w/o AD  Toilet Transfer: stand by assistance with  rolling walker  using  Step Transfer  Gait: Pt ambulated 12 mins w/ RW, working on varying speed  of gait, head turns, searching environment for particular items, obstacle and door negotiation and sudden stops / starts.   Balance: No LOB during gait or transfers but did require cues to increase safety and attention to task.  Pt performed dressing in sitting and standing w/o support requiring SBA  Stairs:  Pt ascended/descended 8 stair(s) with none with single hand rail L UE ascending, R UE descending with Supervision or Set-up Assistance.     Education:  Patient provided with verbal education and demonstrations education regarding safety awareness and home exercise program.  Understanding was verbalized.     Patient left up in chair with all lines intact, call button in reach, and nurse notified    GOALS:   Multidisciplinary Problems       Physical Therapy Goals          Problem: Physical Therapy    Goal Priority Disciplines Outcome Goal Variances Interventions   Physical Therapy Goal     PT, PT/OT Progressing     Description: Goals to be met by: 24     Patient will increase functional independence with mobility by performin. Sit to stand transfer with [place]  2. Gait  x 300 feet with [place] using No Assistive Device.   3. Pt to ascend/descend 5 steps with 1 hand railing and stand by assistance                          Time Tracking:     PT Received On: 24  PT Start Time: 943     PT Stop Time: 1009  PT Total Time (min): 26 min     Billable Minutes: Gait Training 15 and Therapeutic Activity 11    Treatment Type: Treatment  PT/PTA: PTA     Number of PTA visits since last PT visit: 2     2024

## 2024-06-11 NOTE — PROGRESS NOTES
Discharge summary  Hospital Medicine    Patient Name: Danae Mora  YOB: 1935    Admit Date: 6/6/2024                     LOS: 5    SUBJECTIVE:     Reason for Admission:  Lingular pneumonia  See H&P for detailed presentating history and ROS.      Interval history:  Patient mine admitted with generalized weakness, she attempted to come herself by driving to the hospital was unable to ambulate, patient was brought to emergency room where patient was diagnosed with the lingula pneumonia and questionable infiltrate, but also with a all pulmonary embolism.  With that said patient was admitted for IV antibiotics but she has afebrile with no shortness a breath, she has been on Lovenox subQ q.12 will go ahead and send him home on 2.5 of Eliquis b.i.d. for 14 days a follow with me next week   Through the weekend she became delirious, sundowning effect, she is back to her baseline in the room in the company of her son.  Plan has been discussed with both of them patient ambulated with my assistance with no significant limitations she will do physical therapy before going home   Case discussed with case management patient already accepted to [facility]      OBJECTIVE:     Vital Signs Range (Last 24H):  Temp:  [97.9 °F (36.6 °C)-98.5 °F (36.9 °C)]   Pulse:  [65-74]   Resp:  [18-19]   BP: (115-159)/(57-81)   SpO2:  [95 %-100 %] Body mass index is 22.73 kg/m².  Wt Readings from Last 3 Encounters:   06/07/24 0930 52.8 kg (116 lb 6.5 oz)   06/06/24 1639 52.2 kg (115 lb)   04/29/24 1018 51.3 kg (113 lb)   12/26/23 1326 52.2 kg (115 lb)       I & O (Last 24H):  Intake/Output Summary (Last 24 hours) at 6/11/2024 1112  Last data filed at 6/11/2024 0242  Gross per 24 hour   Intake 420 ml   Output 400 ml   Net 20 ml       Physical Exam:  Patient alert oriented x3   HEENT within normal limits no JVD no supraclavicular retractions no use of accessory muscles for breathing   Heart systolic murmur 2/6   Lungs essentially  "clear bilaterally   Abdomen benign   Extremities arthritic changes no edema    Diagnostic Results:  Lab Results   Component Value Date    WBC 4.99 06/11/2024    HGB 11.4 (L) 06/11/2024    HCT 33.8 (L) 06/11/2024    MCV 94.4 (H) 06/11/2024     06/11/2024     No results for input(s): "GLU", "NA", "K", "CL", "CO2", "BUN", "CREATININE", "CALCIUM", "MG" in the last 24 hours.  No results found for: "INR", "PROTIME"  Lab Results   Component Value Date    HGBA1C 4.8 10/16/2023     No results for input(s): "POCTGLUCOSE" in the last 72 hours.    ASSESSMENT/PLAN:     Active Hospital Problems    Diagnosis  POA    *Lingular pneumonia [J18.9]  Unknown      Resolved Hospital Problems   No resolved problems to display.        Problems Addressed Today:    No problem-specific Assessment & Plan notes found for this encounter.    PE   Questionable pneumonia   Chronic neuropathy   Rheumatoid arthritis   Chronic anemia   Hypertension    DISCHARGE PLANNING:   [facility]  Home health for full services    Signing Physician:  Jacoby Verdugo MD   "

## 2024-06-11 NOTE — PROGRESS NOTES
Discharge instructions given to patient and family member. New medications and side effects discussed. Follow-up appointments reviewed. IV and telemetry discontinued. Pt will go home with home health. Pt was wheeled down via wheelchair and picked up via private vehicle.

## 2024-06-11 NOTE — PLAN OF CARE
06/11/24 1217   Final Note   Assessment Type Final Discharge Note   Anticipated Discharge Disposition Home-Health   Post-Acute Status   Post-Acute Authorization Home Health   Home Health Status Set-up Complete/Auth obtained   Discharge Delays None known at this time     Pt will dc to home with [facility] . AVS, dc info sent. Son will transport home.

## 2024-06-11 NOTE — PLAN OF CARE
Pt/son chose [facility], referral sent - unable to accept  Son at nurses station and informed of this - [facility] next choice    Referral sent

## 2024-06-12 ENCOUNTER — TELEPHONE (OUTPATIENT)
Dept: INTERNAL MEDICINE | Facility: CLINIC | Age: 89
End: 2024-06-12
Payer: MEDICARE

## 2024-06-12 LAB — BACTERIA BLD CULT: NORMAL

## 2024-06-12 PROCEDURE — G0179 MD RECERTIFICATION HHA PT: HCPCS | Mod: ,,, | Performed by: INTERNAL MEDICINE

## 2024-06-14 ENCOUNTER — PATIENT OUTREACH (OUTPATIENT)
Dept: ADMINISTRATIVE | Facility: CLINIC | Age: 89
End: 2024-06-14
Payer: MEDICARE

## 2024-06-14 NOTE — PROGRESS NOTES
C3 nurse attempted to contact Danae Mora for a TCC post hospital discharge follow up call. The patient answered but was working with PT and requested a call back in about an hour.   The patient has a scheduled HOSFU appointment with Jacoby Topete MD   on 6/17/24 @ 2:00.

## 2024-06-14 NOTE — PROGRESS NOTES
C3 nurse spoke with Danae Mora for a TCC post hospital discharge follow up call. The patient has a scheduled HOSFU appointment with Jacoby Topete MD on 6/17/24 @ 2:00.

## 2024-06-17 ENCOUNTER — OFFICE VISIT (OUTPATIENT)
Dept: INTERNAL MEDICINE | Facility: CLINIC | Age: 89
End: 2024-06-17
Payer: MEDICARE

## 2024-06-17 VITALS
WEIGHT: 112 LBS | SYSTOLIC BLOOD PRESSURE: 134 MMHG | HEART RATE: 70 BPM | BODY MASS INDEX: 21.99 KG/M2 | RESPIRATION RATE: 16 BRPM | OXYGEN SATURATION: 95 % | DIASTOLIC BLOOD PRESSURE: 70 MMHG | HEIGHT: 60 IN

## 2024-06-17 DIAGNOSIS — I10 PRIMARY HYPERTENSION: ICD-10-CM

## 2024-06-17 DIAGNOSIS — Z09 HOSPITAL DISCHARGE FOLLOW-UP: Primary | ICD-10-CM

## 2024-06-17 DIAGNOSIS — J30.89 SEASONAL ALLERGIC RHINITIS DUE TO OTHER ALLERGIC TRIGGER: ICD-10-CM

## 2024-06-17 DIAGNOSIS — D50.8 OTHER IRON DEFICIENCY ANEMIA: ICD-10-CM

## 2024-06-17 DIAGNOSIS — J18.9 LINGULAR PNEUMONIA: ICD-10-CM

## 2024-06-17 PROBLEM — D50.9 IRON DEFICIENCY ANEMIA: Status: ACTIVE | Noted: 2024-06-17

## 2024-06-17 PROCEDURE — 99496 TRANSJ CARE MGMT HIGH F2F 7D: CPT | Mod: ,,, | Performed by: NURSE PRACTITIONER

## 2024-06-17 PROCEDURE — 1159F MED LIST DOCD IN RCRD: CPT | Mod: CPTII,,, | Performed by: NURSE PRACTITIONER

## 2024-06-17 PROCEDURE — 1101F PT FALLS ASSESS-DOCD LE1/YR: CPT | Mod: CPTII,,, | Performed by: NURSE PRACTITIONER

## 2024-06-17 PROCEDURE — 3288F FALL RISK ASSESSMENT DOCD: CPT | Mod: CPTII,,, | Performed by: NURSE PRACTITIONER

## 2024-06-17 PROCEDURE — 1160F RVW MEDS BY RX/DR IN RCRD: CPT | Mod: CPTII,,, | Performed by: NURSE PRACTITIONER

## 2024-06-17 PROCEDURE — 1111F DSCHRG MED/CURRENT MED MERGE: CPT | Mod: CPTII,,, | Performed by: NURSE PRACTITIONER

## 2024-06-17 NOTE — PROGRESS NOTES
Subjective:      Patient ID: Danae Mora is a 88 y.o. female.    Chief Complaint: Follow-up (Hospital f/u )    Family and/or Caretaker present at visit?  No.  Diagnostic tests reviewed/disposition: No diagnosic tests pending after this hospitalization.  Disease/illness education: Yes  Home health/community services discussion/referrals: [facility].   Establishment or re-establishment of referral orders for community resources: No other necessary community resources.   Discussion with other health care providers: No discussion with other health care providers necessary.  I reviewed and reconciled the medications from hospital discharge.    HPI: Patient here today for hospital discharge follow up. She presented to ER on 6/6 with c/o L shoulder pain, SOB, and nonproductive cough. Work up with CTA chest revealed compressive atelectatic changes with LLL infiltrate with moderate effusion. Admitted at that time for eval and tx. Significant history rheumatoid arthritis, hypertension, severe neuropathy. States s/s x 2-1/2 weeks with left-sided chest pain not associated with exertion, no fever no chills no sputum production. BC neg x 2. D/c home 6/14. Overall, she is doing ok. She is exercising regularly with and without PT.  Son is checking on her regularly.    Review of patient's allergies indicates:   Allergen Reactions    Celebrex [celecoxib] Anaphylaxis    Gabapentin     Sulfamethoxazole        Review of Systems  Constitutional: No fever, No chills, No sweats, No fatigue  Ear/Nose/Mouth/Throat: No nasal congestion, No vertigo. Sneezing  Respiratory: No shortness of breath, occ cough, No sputum production, No wheezing, No exertional dyspnea.   Cardiovascular: No chest pain, No palpitations, No claudication, No orthopnea, No peripheral edema.  Gastrointestinal: No nausea, No vomiting, No diarrhea, No rectal bleeding, No constipation, No abdominal pain.  Genitourinary: No dysuria, No hematuria, chronic  frequency.    Objective:   Visit Vitals  /70 (BP Location: Left arm, Patient Position: Sitting, BP Method: Small (Manual))   Pulse 70   Resp 16   Ht 5' (1.524 m)   Wt 50.8 kg (112 lb)   SpO2 95%   BMI 21.87 kg/m²     The patient's weight trend is below:   Wt Readings from Last 4 Encounters:   06/17/24 50.8 kg (112 lb)   06/07/24 52.8 kg (116 lb 6.5 oz)   04/29/24 51.3 kg (113 lb)   12/26/23 52.2 kg (115 lb)        Physical Exam  Vitals and nursing note reviewed.   Constitutional:       General: She is not in acute distress.     Appearance: Normal appearance. She is normal weight. She is not ill-appearing, toxic-appearing or diaphoretic.   HENT:      Head: Normocephalic and atraumatic.   Pulmonary:      Effort: Pulmonary effort is normal.   Neurological:      General: No focal deficit present.      Mental Status: She is alert and oriented to person, place, and time. Mental status is at baseline.         Assessment/Plan:   1. Hospital discharge follow-up  Personally reviewed hospital records, diagnostic studies, and discharge summary     2. Lingular pneumonia  Repeat CXR 4 weeks  Mucinex/Robitussin DM    - X-Ray Chest PA And Lateral; Future    3. Primary hypertension  HYPERTENSION RECOMMENDATIONS:  Continue current treatment regimen.  Dietary sodium restriction.  Regular aerobic exercise.  Reduce stress.  Goal BP <130/80; Encouraged to monitor blood pressure at home      4. Seasonal allergic rhinitis due to other allergic trigger  Claritin/Allegra in AM  Flonase    5. Other iron deficiency anemia  Repeat CBC in 1 month  Monitor for bleeding    - CBC Auto Differential; Future      Medication List with Changes/Refills   Current Medications    ACETAMINOPHEN (TYLENOL) 325 MG TABLET    Take 2 tablets (650 mg total) by mouth every 8 (eight) hours as needed for Pain.    AMLODIPINE (NORVASC) 5 MG TABLET    Take 1 tablet (5 mg total) by mouth 2 (two) times a day.    APIXABAN (ELIQUIS) 2.5 MG TAB    Take 1 tablet (2.5 mg  total) by mouth 2 (two) times daily.    GABAPENTIN (NEURONTIN) 300 MG CAPSULE    TAKE 1 CAPSULE EVERY       EVENING.    HYDROXYCHLOROQUINE (PLAQUENIL) 200 MG TABLET    Take 1 tablet (200 mg total) by mouth once daily.    METOPROLOL SUCCINATE (TOPROL-XL) 50 MG 24 HR TABLET    Take 1 tablet (50 mg total) by mouth once daily.    POLYETHYLENE GLYCOL (GLYCOLAX) 17 GRAM PWPK    Take 17 g by mouth once daily.   Discontinued Medications    BRIMONIDINE 0.2% (ALPHAGAN) 0.2 % DROP    Place into both eyes.    CHLORPHENIRAMINE-PSEUDOEPHEDRINE-ACETAMINOPHEN (SINUTAB) 2- MG PER TABLET    Take 1 tablet by mouth Daily.    FAMOTIDINE (PEPCID) 20 MG TABLET    Take 1 tablet (20 mg total) by mouth once daily.        No follow-ups on file.    Chemistry:  Lab Results   Component Value Date     06/08/2024    K 3.9 06/08/2024    BUN 20.3 (H) 06/08/2024    CREATININE 0.95 06/08/2024    EGFRNORACEVR 58 06/08/2024    GLUCOSE 110 06/08/2024    CALCIUM 8.6 06/08/2024    ALKPHOS 85 06/07/2024    LABPROT 5.8 06/07/2024    ALBUMIN 2.7 (L) 06/07/2024    BILIDIR 0.2 04/05/2022    IBILI 0.20 04/05/2022    AST 22 06/07/2024    ALT 26 06/07/2024        Lab Results   Component Value Date    HGBA1C 4.8 10/16/2023        Hematology:  Lab Results   Component Value Date    WBC 4.99 06/11/2024    HGB 11.4 (L) 06/11/2024    HCT 33.8 (L) 06/11/2024     06/11/2024       Lipid Panel:  Lab Results   Component Value Date    CHOL 205 (H) 05/17/2021    HDL 53 05/17/2021    .00 05/17/2021    TRIG 106 05/17/2021    TOTALCHOLEST 4 05/17/2021        Urine:  Lab Results   Component Value Date    APPEARANCEUA Clear 06/10/2024    SGUA 1.014 06/10/2024    PROTEINUA Negative 06/10/2024    KETONESUA Negative 06/10/2024    LEUKOCYTESUR Negative 06/10/2024    RBCUA 0-5 06/10/2024    WBCUA 0-5 06/10/2024    BACTERIA None Seen 06/10/2024    SQEPUA Trace 06/10/2024    HYALINECASTS 0-2 (A) 06/10/2024

## 2024-06-21 DIAGNOSIS — Z86.718 HISTORY OF DVT (DEEP VEIN THROMBOSIS): ICD-10-CM

## 2024-06-24 ENCOUNTER — TELEPHONE (OUTPATIENT)
Dept: INTERNAL MEDICINE | Facility: CLINIC | Age: 89
End: 2024-06-24

## 2024-06-24 DIAGNOSIS — I10 PRIMARY HYPERTENSION: ICD-10-CM

## 2024-06-24 RX ORDER — APIXABAN 2.5 MG/1
2.5 TABLET, FILM COATED ORAL 2 TIMES DAILY
Qty: 28 TABLET | Refills: 0 | Status: SHIPPED | OUTPATIENT
Start: 2024-06-24

## 2024-06-24 NOTE — TELEPHONE ENCOUNTER
Medication  ELIQUIS 2.5 mg Tab [[id number]]  Outpatient Medication Detail     Disp Refills Start End BLANE   ELIQUIS 2.5 mg Tab 28 tablet 0 6/24/2024 -- No   Sig - Route: Take 1 tablet (2.5 mg total) by mouth 2 (two) times daily. - Oral   Sent to pharmacy as: ELIQUIS 2.5 mg Tab   Class: Normal   Order: [id number]   Date/Time Signed: 6/24/2024 07:32       E-Prescribing Status: Receipt confirmed by pharmacy (6/24/2024  7:33 AM CDT)     Pharmacy    [id number]  [place]  [address] [place] [address]

## 2024-06-24 NOTE — TELEPHONE ENCOUNTER
----- Message from Ev Green sent at 6/24/2024  9:06 AM CDT -----  Regarding: advice  Who Called: Danae Mora    Refill or New Rx:Refill  RX Name and Strength:ELIQUIS 2.5 mg Tab 28 tablet 0 6/24/2024 - No  Sig - Route: Take 1 tablet (2.5 mg total) by mouth 2 (two) times daily. - Oral      How is the patient currently taking it? (ex. 1XDay):    Is this a 30 day or 90 day RX:    Local or Mail Order:local    [id number] - [place], [place] - [address] [facility]   Phone: [phone]  Fax: [phone]        Ordering Provider:Dr. Topete      Preferred Method of Contact: Phone Call  Patient's Preferred Phone Number on File: [phone]   Best Call Back Number, if different:  Additional Information: Pt is requesting a refill on Eliquis; however, before I could confirm that it has been sent over to [facility], she requested to speak with Pierre; please advise. Thanks.

## 2024-06-25 RX ORDER — METOPROLOL SUCCINATE 50 MG/1
50 TABLET, EXTENDED RELEASE ORAL DAILY
Qty: 90 TABLET | Refills: 3 | Status: SHIPPED | OUTPATIENT
Start: 2024-06-25

## 2024-06-25 NOTE — TELEPHONE ENCOUNTER
----- Message from Jake Posadas sent at 6/25/2024  9:02 AM CDT -----  .Type:  RX Refill Request    Who Called: Danae   Refill or New Rx:  RX Name and Strength: metoprolol succinate (TOPROL-XL) 50 MG 24 hr tablet  How is the patient currently taking it? (ex. 1XDay): 1 xday   Is this a 30 day or 90 day RX: 90 day   Preferred Pharmacy with phone number: [facility]   Local or Mail Order: Local   Ordering Provider: Efrem   Would the patient rather a call back or a response via [facility]?   Best Call Back Number: [phone]  Additional Information: Please call with any questions.

## 2024-07-01 DIAGNOSIS — Z86.718 HISTORY OF DVT (DEEP VEIN THROMBOSIS): ICD-10-CM

## 2024-07-08 ENCOUNTER — EXTERNAL HOME HEALTH (OUTPATIENT)
Dept: HOME HEALTH SERVICES | Facility: HOSPITAL | Age: 89
End: 2024-07-08
Payer: MEDICARE

## 2024-07-11 ENCOUNTER — TELEPHONE (OUTPATIENT)
Dept: INTERNAL MEDICINE | Facility: CLINIC | Age: 89
End: 2024-07-11
Payer: MEDICARE

## 2024-07-11 DIAGNOSIS — I10 PRIMARY HYPERTENSION: ICD-10-CM

## 2024-07-11 NOTE — TELEPHONE ENCOUNTER
----- Message from May Villanueva sent at 7/11/2024  9:58 AM CDT -----  Regarding: refill    Who Called: Danae Mora    Refill or New Rx:Refill  RX Name and Strength:metoprolol succinate (TOPROL-XL) 50 MG 24 hr tablet  How is the patient currently taking it? (ex. 1XDay):  Is this a 30 day or 90 day RX:  Local or Mail Order:  List of preferred pharmacies on file (remove unneeded):     [id number] - [place] - [address] [facility]   Phone: [phone]  Fax: [phone]    If different Pharmacy is requested, enter Pharmacy information here including location and phone number:    Ordering Provider:    Preferred Method of Contact: Phone Call  Patient's Preferred Phone Number on File: [phone]   Best Call Back Number, if different:  Additional Information: Danae is requesting the [facility]oprolol be [facility].

## 2024-07-11 NOTE — TELEPHONE ENCOUNTER
Medication  metoprolol succinate (TOPROL-XL) 50 MG 24 hr tablet [[id number]]  Outpatient Medication Detail     Disp Refills Start End BLANE   metoprolol succinate (TOPROL-XL) 50 MG 24 hr tablet 90 tablet 3 6/25/2024 -- No   Sig - Route: Take 1 tablet (50 mg total) by mouth once daily. - Oral   Sent to pharmacy as: metoprolol succinate (TOPROL-XL) 50 MG 24 hr tablet   Class: Normal   Notes to Pharmacy: .   Order: [id number]   Date/Time Signed: 6/25/2024 09:33       E-Prescribing Status: Receipt confirmed by pharmacy (6/25/2024  9:33 AM CDT)     Pharmacy    [facility] [place] - [address] [facility] AT [address] TO [facility]

## 2024-07-12 RX ORDER — METOPROLOL SUCCINATE 50 MG/1
50 TABLET, EXTENDED RELEASE ORAL DAILY
Qty: 90 TABLET | Refills: 3 | Status: SHIPPED | OUTPATIENT
Start: 2024-07-12

## 2024-07-31 ENCOUNTER — DOCUMENT SCAN (OUTPATIENT)
Dept: HOME HEALTH SERVICES | Facility: HOSPITAL | Age: 89
End: 2024-07-31
Payer: MEDICARE

## 2024-08-15 ENCOUNTER — TELEPHONE (OUTPATIENT)
Dept: INTERNAL MEDICINE | Facility: CLINIC | Age: 89
End: 2024-08-15
Payer: MEDICARE

## 2024-08-15 DIAGNOSIS — Z13.29 THYROID DISORDER SCREENING: ICD-10-CM

## 2024-08-15 DIAGNOSIS — E78.2 MIXED HYPERLIPIDEMIA: ICD-10-CM

## 2024-08-15 DIAGNOSIS — R73.01 ELEVATED FASTING GLUCOSE: ICD-10-CM

## 2024-08-15 DIAGNOSIS — Z00.00 WELLNESS EXAMINATION: ICD-10-CM

## 2024-08-15 DIAGNOSIS — I10 PRIMARY HYPERTENSION: Primary | ICD-10-CM

## 2024-08-15 NOTE — TELEPHONE ENCOUNTER
----- Message from Sona Keli sent at 8/15/2024  2:39 PM CDT -----  .Who Called: Danae Mora        Preferred Method of Contact: Phone Call  Patient's Preferred Phone Number on File: [phone]   Best Call Back Number, if different:  Additional Information: Karyna dyer ask for call back

## 2024-08-16 ENCOUNTER — HOSPITAL ENCOUNTER (OUTPATIENT)
Dept: RADIOLOGY | Facility: HOSPITAL | Age: 89
Discharge: HOME | End: 2024-08-16
Attending: NURSE PRACTITIONER
Payer: MEDICARE

## 2024-08-16 DIAGNOSIS — J18.9 LINGULAR PNEUMONIA: ICD-10-CM

## 2024-08-16 PROCEDURE — 71046 X-RAY EXAM CHEST 2 VIEWS: CPT | Mod: TC

## 2024-08-19 ENCOUNTER — TELEPHONE (OUTPATIENT)
Dept: INTERNAL MEDICINE | Facility: CLINIC | Age: 89
End: 2024-08-19
Payer: MEDICARE

## 2024-08-19 NOTE — TELEPHONE ENCOUNTER
----- Message from Shikha Grider NP sent at 8/19/2024  8:51 AM CDT -----  Please let pt know repeat CXR neg

## 2024-09-04 ENCOUNTER — TELEPHONE (OUTPATIENT)
Dept: INTERNAL MEDICINE | Facility: CLINIC | Age: 89
End: 2024-09-04

## 2024-09-04 NOTE — TELEPHONE ENCOUNTER
----- Message from Sona Dickey sent at 9/4/2024  1:49 PM CDT -----  .Who Called: Danae Mora        Preferred Method of Contact: Phone Call  Patient's Preferred Phone Number on File: [phone]   Best Call Back Number, if different:  Additional Information: pt asking due to weather she asking she get phone call for her appt ?

## 2024-09-05 ENCOUNTER — OFFICE VISIT (OUTPATIENT)
Dept: INTERNAL MEDICINE | Facility: CLINIC | Age: 89
End: 2024-09-05
Payer: MEDICARE

## 2024-09-05 VITALS
BODY MASS INDEX: 22.58 KG/M2 | HEART RATE: 75 BPM | WEIGHT: 115 LBS | OXYGEN SATURATION: 96 % | DIASTOLIC BLOOD PRESSURE: 66 MMHG | SYSTOLIC BLOOD PRESSURE: 128 MMHG | HEIGHT: 60 IN | RESPIRATION RATE: 16 BRPM

## 2024-09-05 DIAGNOSIS — Z00.00 WELL ADULT EXAM: Primary | ICD-10-CM

## 2024-09-05 DIAGNOSIS — I10 PRIMARY HYPERTENSION: ICD-10-CM

## 2024-09-05 DIAGNOSIS — F32.A ANXIETY AND DEPRESSION: ICD-10-CM

## 2024-09-05 DIAGNOSIS — F41.9 ANXIETY AND DEPRESSION: ICD-10-CM

## 2024-09-05 DIAGNOSIS — G62.9 NEUROPATHY: ICD-10-CM

## 2024-09-05 DIAGNOSIS — M06.9 RHEUMATOID ARTHRITIS, INVOLVING UNSPECIFIED SITE, UNSPECIFIED WHETHER RHEUMATOID FACTOR PRESENT: ICD-10-CM

## 2024-09-05 DIAGNOSIS — D50.8 OTHER IRON DEFICIENCY ANEMIA: ICD-10-CM

## 2024-09-05 RX ORDER — BIMATOPROST 0.1 MG/ML
SOLUTION/ DROPS OPHTHALMIC
COMMUNITY
Start: 2024-09-05

## 2024-09-05 RX ORDER — FAMOTIDINE 20 MG/1
TABLET, FILM COATED ORAL
COMMUNITY
Start: 2024-08-07 | End: 2024-09-05

## 2024-09-05 NOTE — PROGRESS NOTES
Internal Medicine      Patient ID: [id number]     Chief Complaint: Medicare Annual Wellness     HPI:     Danae Mora is a 88 y.o. female here today for a Medicare Annual Wellness visit and comprehensive Health Risk Assessment. She has questions about Umary, OTC supplement. It is helping arthritis s/s. She has been on medication for approximately 45 days. Overall, doing well. Some occ anxiety/depression.  No further issues.    Health Maintenance   Topic Date Due    TETANUS VACCINE  Never done    Shingles Vaccine (1 of 2) Never done    Lipid Panel  08/16/2029        Past Medical History:   Diagnosis Date    History of degenerative disc disease     Scoliosis         Past Surgical History:   Procedure Laterality Date    HIP SURGERY      right hip replacement    KNEE SURGERY      left knee replacement        Social History     Socioeconomic History    Marital status:    Tobacco Use    Smoking status: Never    Smokeless tobacco: Never     Social Determinants of Health     Financial Resource Strain: Low Risk  (6/17/2024)    Overall Financial Resource Strain (CARDIA)     Difficulty of Paying Living Expenses: Not hard at all   Food Insecurity: No Food Insecurity (6/17/2024)    Hunger Vital Sign     Worried About Running Out of Food in the Last Year: Never true     Ran Out of Food in the Last Year: Never true   Transportation Needs: No Transportation Needs (6/17/2024)    TRANSPORTATION NEEDS     Transportation : No   Physical Activity: Insufficiently Active (6/17/2024)    Exercise Vital Sign     Days of Exercise per Week: 2 days     Minutes of Exercise per Session: 10 min   Stress: No Stress Concern Present (6/17/2024)    [employer] - Occupational Stress Questionnaire     Feeling of Stress : Not at all   Housing Stability: Low Risk  (6/17/2024)    Housing Stability Vital Sign     Unable to Pay for Housing in the Last Year: No     Homeless in the Last Year: No        No family  history on file.     Current Outpatient Medications   Medication Instructions    acetaminophen (TYLENOL) 650 mg, Oral, Every 8 hours PRN    amLODIPine (NORVASC) 5 mg, Oral, 2 times daily    apixaban (ELIQUIS) 2.5 mg, Oral, 2 times daily    hydroxychloroquine (PLAQUENIL) 200 mg, Oral, Daily    LUMIGAN 0.01 % Drop Both Eyes    metoprolol succinate (TOPROL-XL) 50 mg, Oral, Daily    NEURONTIN 300 mg, Oral, Nightly       Review of patient's allergies indicates:   Allergen Reactions    Celebrex [celecoxib] Anaphylaxis    Gabapentin     Sulfamethoxazole         Immunization History   Administered Date(s) Administered    COVID-19, MRNA, LN-S, PF (Pfizer) (Purple Cap) 03/08/2021, 03/29/2021, 11/16/2021        Patient Care Team:  Jacoby Topete MD as PCP - General (Internal Medicine)    Subjective:     Review of Systems  Constitutional: No fever, No chills, No sweats, No fatigue, No weight loss.  Eyes: No blurring.  Ear/Nose/Mouth/Throat: No nasal congestion, No vertigo.  Respiratory: No shortness of breath, No cough, No sputum production, No wheezing, No exertional dyspnea.   Cardiovascular: No chest pain, No palpitations, No claudication, No orthopnea, No peripheral edema.  Gastrointestinal: No nausea, No vomiting, No diarrhea, No rectal bleeding, No constipation, No abdominal pain.  Genitourinary: No dysuria, No hematuria, No frequency.  Endocrine: No excessive thirst, No polyuria, No cold intolerance, No heat intolerance.  Musculoskeletal: No joint pain, No muscle pain.  Integumentary: No rash, No ecchymosis.  Neurologic: No altered mental status, No headaches.  Psychiatrics: No anxiety,No depression, No SI/HI.    Objective:     Visit Vitals  /66 (BP Location: Left arm, Patient Position: Sitting, BP Method: Small (Manual))   Pulse 75   Resp 16   Ht 5' (1.524 m)   Wt 52.2 kg (115 lb)   SpO2 96%   BMI 22.46 kg/m²       Physical Exam  Vitals and nursing note reviewed.   Constitutional:       General: She is not in  acute distress.     Appearance: Normal appearance. She is normal weight. She is not ill-appearing, toxic-appearing or diaphoretic.   HENT:      Head: Normocephalic and atraumatic.      Right Ear: Tympanic membrane, ear canal and external ear normal.      Left Ear: Tympanic membrane, ear canal and external ear normal.      Nose: Nose normal.      Mouth/Throat:      Mouth: Mucous membranes are moist.      Pharynx: Oropharynx is clear. No oropharyngeal exudate or posterior oropharyngeal erythema.   Eyes:      General: No scleral icterus.        Right eye: No discharge.         Left eye: No discharge.      Extraocular Movements: Extraocular movements intact.      Conjunctiva/sclera: Conjunctivae normal.      Pupils: Pupils are equal, round, and reactive to light.   Neck:      Vascular: No carotid bruit.   Cardiovascular:      Rate and Rhythm: Normal rate and regular rhythm.      Pulses: Normal pulses.      Heart sounds: Normal heart sounds. No murmur heard.  Pulmonary:      Effort: Pulmonary effort is normal. No respiratory distress.      Breath sounds: Normal breath sounds. No wheezing or rhonchi.   Abdominal:      General: Abdomen is flat. Bowel sounds are normal. There is no distension.      Palpations: Abdomen is soft. There is no mass.      Tenderness: There is no abdominal tenderness. There is no guarding or rebound.      Hernia: No hernia is present.   Musculoskeletal:         General: Normal range of motion.      Cervical back: Normal range of motion and neck supple. No rigidity or tenderness.   Lymphadenopathy:      Cervical: No cervical adenopathy.   Skin:     General: Skin is warm and dry.   Neurological:      General: No focal deficit present.      Mental Status: She is alert and oriented to person, place, and time. Mental status is at baseline.      Motor: No weakness.   Psychiatric:         Mood and Affect: Mood normal.         Behavior: Behavior normal.         Thought Content: Thought content normal.          Judgment: Judgment normal.         Assessment:       ICD-10-CM ICD-9-CM   1. Well adult exam  Z00.00 V70.0   2. Anxiety and depression  F41.9 300.00    F32.A 311   3. Rheumatoid arthritis, involving unspecified site, unspecified whether rheumatoid factor present  M06.9 714.0   4. Primary hypertension  I10 401.9   5. Other iron deficiency anemia  D50.8 280.8   6. Neuropathy  G62.9 355.9        Plan:     1. Well adult exam  HEALTH EDUCATION RECOMMENDATIONS:  weight control, diet and cholesterol, exercise 150 minutes per week, stress reduction, and adequate sleep 6-8 hours per night    2. Anxiety and depression  Stable without medication  Continue to monitor    -     CBC Auto Differential; Future; Expected date: 03/05/2025  -     Comprehensive Metabolic Panel; Future; Expected date: 03/05/2025    3. Rheumatoid arthritis, involving unspecified site, unspecified whether rheumatoid factor present  Stable on current dosage of Plaquenil 200mg daily    -     CBC Auto Differential; Future; Expected date: 03/05/2025  -     Comprehensive Metabolic Panel; Future; Expected date: 03/05/2025    4. Primary hypertension  HYPERTENSION RECOMMENDATIONS:  Continue current treatment regimen.  Dietary sodium restriction.  Regular aerobic exercise.  Reduce stress.  Goal BP <130/80; Encouraged to monitor blood pressure at home    -     CBC Auto Differential; Future; Expected date: 03/05/2025  -     Comprehensive Metabolic Panel; Future; Expected date: 03/05/2025    5. Other iron deficiency anemia  Stable labs  Repeat CBC in 6 months    -     CBC Auto Differential; Future; Expected date: 03/05/2025  -     Comprehensive Metabolic Panel; Future; Expected date: 03/05/2025    6. Neuropathy  Stable on gabapentin 300mg     -     CBC Auto Differential; Future; Expected date: 03/05/2025  -     Comprehensive Metabolic Panel; Future; Expected date: 03/05/2025         A comprehensive HEALTH RISK ASSESSMENT was completed today. Results are  summarized below:    The following EMOTIONAL/SOCIAL CONCERNS were identified on today's screening for Social Isolation, Depression and Anxiety:  *Patient reports frequently feeling STRESSED or ANXIOUS. (Do you feel stressed (tense, restless, nervous, or anxious, or unable to sleep at night because your mind is troubled all the time)?: (!) Rather much)  --A General Anxiety Disorder-7 Questionnaire was administered with the folllowing results: (BHAVANI-7 Score: 8  Interpretation: Mild Anxiety)  <repeat PHQ-9>     There are NO COGNITIVE FUNCTION CONCERNS identified on today's screening.  The following FUNCTIONAL AND/OR SAFETY CONCERNS were identified on today's screening for Physical Symptoms, Nutritional, Home Safety/Living Situation, Fall Risk, Activities of Daily Living, Independent Activities of Daily Living, Physical Activity,Timed Up and Go test and Whisper test::  *Patient reports PROBLEMS WITH BLADDER FUNCTION. (Do you have any problems with urination like going too frequently, trouble urinating, leakage or accidents?: (!) Yes)  *Patient reports NO ROUTINE EXERCISE. (On average, how many days per week do you engage in moderate to strenuous exercise (like a brisk walk)?: (!) 0)  *Patient reports recently FEELING DIZZY, has a FEAR OF FALLING or HAS FALLEN. (In the past four weeks have you felt dizzy when standing up, were afraid of falling or fallen?: (!) Yes)     The patient reports NO OPIOID PRESCRIPTIONS. This was confirmed through medication reconciliation and the [facility] website.    The patient is NOT A TOBACCO USER.  The patient reports NO SIGNIFICANT ALCOHOL USE.     All Questions regarding food, transportation or housing were not answered today.     Advance Care Planning   Advance Care Planning     Date: 09/05/2024  Patient did not wish or was not able to name a surrogate decision maker or provide an Advance Care Plan.       I attest to discussing Advance Care Planning with patient and/or family  member.  Education was provided including the importance of the Health Care Power of , Advance Directives, and/or [place] documentation.  The patient expressed understanding to the importance of this information and discussion.       Provided patient with a 5-10 year written screening schedule and personal prevention plan. Recommendations were developed using the USPSTF age appropriate recommendations. Education, counseling, and referrals were provided as needed. After Visit Summary printed and given to patient, which includes a list of additional screenings\tests needed.    Follow up in about 6 months (around 3/5/2025) for With Dr. Topete. In addition to their scheduled follow up, the patient has also been instructed to follow up on as needed basis.     Future Appointments   Date Time Provider Department Center   3/13/2025 10:00 AM Jacoby Topete MD [facility]   9/10/2025 10:20 AM Jacoby Topete MD [facility]        Shikha Grider NP

## 2024-09-09 ENCOUNTER — TELEPHONE (OUTPATIENT)
Dept: INTERNAL MEDICINE | Facility: CLINIC | Age: 89
End: 2024-09-09
Payer: MEDICARE

## 2024-09-09 NOTE — TELEPHONE ENCOUNTER
----- Message from Yony Hays MA sent at 8/28/2024  2:31 PM CDT -----  Regarding: RICHARD Trivedi 9/5/24 @ 2:40 PM  Labs done.

## 2024-09-16 PROBLEM — J18.9 LINGULAR PNEUMONIA: Status: RESOLVED | Noted: 2024-06-07 | Resolved: 2024-09-16

## 2024-10-22 ENCOUNTER — OFFICE VISIT (OUTPATIENT)
Dept: INTERNAL MEDICINE | Facility: CLINIC | Age: 89
End: 2024-10-22
Payer: MEDICARE

## 2024-10-22 VITALS
SYSTOLIC BLOOD PRESSURE: 128 MMHG | OXYGEN SATURATION: 100 % | BODY MASS INDEX: 22.97 KG/M2 | HEART RATE: 63 BPM | HEIGHT: 60 IN | WEIGHT: 117 LBS | RESPIRATION RATE: 16 BRPM | DIASTOLIC BLOOD PRESSURE: 72 MMHG

## 2024-10-22 DIAGNOSIS — G62.9 NEUROPATHY: ICD-10-CM

## 2024-10-22 DIAGNOSIS — R60.0 BILATERAL LOWER EXTREMITY EDEMA: Primary | ICD-10-CM

## 2024-10-22 DIAGNOSIS — M79.2 NERVE PAIN: ICD-10-CM

## 2024-10-22 PROCEDURE — 1159F MED LIST DOCD IN RCRD: CPT | Mod: CPTII,,, | Performed by: NURSE PRACTITIONER

## 2024-10-22 PROCEDURE — 3288F FALL RISK ASSESSMENT DOCD: CPT | Mod: CPTII,,, | Performed by: NURSE PRACTITIONER

## 2024-10-22 PROCEDURE — 1125F AMNT PAIN NOTED PAIN PRSNT: CPT | Mod: CPTII,,, | Performed by: NURSE PRACTITIONER

## 2024-10-22 PROCEDURE — 99214 OFFICE O/P EST MOD 30 MIN: CPT | Mod: ,,, | Performed by: NURSE PRACTITIONER

## 2024-10-22 PROCEDURE — 1160F RVW MEDS BY RX/DR IN RCRD: CPT | Mod: CPTII,,, | Performed by: NURSE PRACTITIONER

## 2024-10-22 PROCEDURE — 1101F PT FALLS ASSESS-DOCD LE1/YR: CPT | Mod: CPTII,,, | Performed by: NURSE PRACTITIONER

## 2024-10-22 RX ORDER — FUROSEMIDE 20 MG/1
20 TABLET ORAL DAILY
Qty: 3 TABLET | Refills: 0 | Status: SHIPPED | OUTPATIENT
Start: 2024-10-22 | End: 2024-10-25

## 2024-10-22 RX ORDER — GABAPENTIN 300 MG/1
300 CAPSULE ORAL 2 TIMES DAILY
Qty: 90 CAPSULE | Refills: 3 | Status: SHIPPED | OUTPATIENT
Start: 2024-10-22

## 2024-10-22 NOTE — PROGRESS NOTES
Subjective:      Patient ID: Danae Mora is a 88 y.o. female.    Chief Complaint: Foot Swelling (Foot swelling, sores on legs with swelling as well (both legs and feet) /Hands and feet get numb and tingle /Bruises on her check bone /Discuss medications )      HPI: Patient here today for complaints of bilateral LE sores and swelling x 3 weeks. No fever, chills, sweats. Some discomfort to BLE. No claudication, orthopnea. Some occ LE pain s/s.  She reports desire to inc gabapentin.  Some oozing to LLE.      Review of patient's allergies indicates:   Allergen Reactions    Celebrex [celecoxib] Anaphylaxis    Gabapentin     Sulfamethoxazole        Review of Systems  Constitutional: No fever, No chills, No sweats, No fatigue, No weight loss.  Respiratory: No shortness of breath, No exertional dyspnea.   Cardiovascular: No chest pain, No palpitations, No claudication, No orthopnea, BLE peripheral edema.  Int: LLE clear d/c    Objective:   Visit Vitals  /72 (BP Location: Left arm, Patient Position: Sitting)   Pulse 63   Resp 16   Ht 5' (1.524 m)   Wt 53.1 kg (117 lb)   SpO2 100%   BMI 22.85 kg/m²     The patient's weight trend is below:   Wt Readings from Last 4 Encounters:   10/22/24 53.1 kg (117 lb)   09/05/24 52.2 kg (115 lb)   06/17/24 50.8 kg (112 lb)   06/07/24 52.8 kg (116 lb 6.5 oz)        Physical Exam  Vitals and nursing note reviewed.   Constitutional:       General: She is not in acute distress.     Appearance: Normal appearance. She is normal weight. She is not ill-appearing, toxic-appearing or diaphoretic.   HENT:      Head: Normocephalic and atraumatic.   Cardiovascular:      Rate and Rhythm: Normal rate and regular rhythm.      Heart sounds: Normal heart sounds.   Pulmonary:      Effort: Pulmonary effort is normal.      Breath sounds: Normal breath sounds.   Musculoskeletal:      Right lower leg: Edema (1+ pitting) present.      Left lower leg: Edema (2+ pitting with serous drainage noted)  present.   Skin:     General: Skin is warm and dry.      Findings: Erythema (mild LLE) present.   Neurological:      General: No focal deficit present.      Mental Status: She is alert and oriented to person, place, and time. Mental status is at baseline.   Psychiatric:         Mood and Affect: Mood normal.         Behavior: Behavior normal.         Thought Content: Thought content normal.         Judgment: Judgment normal.         Assessment/Plan:   1. Bilateral lower extremity edema  US BLE r/o DVT--OAC Eliquis 2.5mg BID  Work up with labs    - US Lower Extremity Veins Bilateral; Future  - Comprehensive Metabolic Panel  - CBC Auto Differential  - BNP  - furosemide (LASIX) 20 MG tablet; Take 1 tablet (20 mg total) by mouth once daily. for 3 days  Dispense: 3 tablet; Refill: 0    2. Neuropathy  Inc gabapentin to 300mg twice daily    3. Nerve pain  See #2    - gabapentin (NEURONTIN) 300 MG capsule; Take 1 capsule (300 mg total) by mouth 2 (two) times daily.  Dispense: 90 capsule; Refill: 3      Medication List with Changes/Refills   New Medications    FUROSEMIDE (LASIX) 20 MG TABLET    Take 1 tablet (20 mg total) by mouth once daily. for 3 days   Current Medications    ACETAMINOPHEN (TYLENOL) 325 MG TABLET    Take 2 tablets (650 mg total) by mouth every 8 (eight) hours as needed for Pain.    AMLODIPINE (NORVASC) 5 MG TABLET    Take 1 tablet (5 mg total) by mouth 2 (two) times a day.    APIXABAN (ELIQUIS) 2.5 MG TAB    Take 1 tablet (2.5 mg total) by mouth 2 (two) times daily.    HYDROXYCHLOROQUINE (PLAQUENIL) 200 MG TABLET    Take 1 tablet (200 mg total) by mouth once daily.    LUMIGAN 0.01 % DROP    Place into both eyes.    METOPROLOL SUCCINATE (TOPROL-XL) 50 MG 24 HR TABLET    Take 1 tablet (50 mg total) by mouth once daily.   Changed and/or Refilled Medications    Modified Medication Previous Medication    GABAPENTIN (NEURONTIN) 300 MG CAPSULE gabapentin (NEURONTIN) 300 MG capsule       Take 1 capsule (300 mg  total) by mouth 2 (two) times daily.    TAKE 1 CAPSULE EVERY       EVENING.        Follow up if symptoms worsen or fail to improve.    Chemistry:  Lab Results   Component Value Date     08/16/2024    K 4.4 08/16/2024    BUN 39.9 (H) 08/16/2024    CREATININE 1.15 (H) 08/16/2024    EGFRNORACEVR 46 08/16/2024    GLUCOSE 97 08/16/2024    CALCIUM 9.7 08/16/2024    ALKPHOS 70 08/16/2024    LABPROT 7.3 08/16/2024    ALBUMIN 3.6 08/16/2024    BILIDIR 0.2 04/05/2022    IBILI 0.20 04/05/2022    AST 13 08/16/2024    ALT 15 08/16/2024        Lab Results   Component Value Date    HGBA1C 5.1 08/16/2024        Hematology:  Lab Results   Component Value Date    WBC 6.89 08/16/2024    HGB 13.2 08/16/2024    HCT 39.6 08/16/2024     08/16/2024       Lipid Panel:  Lab Results   Component Value Date    CHOL 221 (H) 08/16/2024    HDL 58 08/16/2024    .00 (H) 08/16/2024    TRIG 110 08/16/2024    TOTALCHOLEST 4 08/16/2024        Urine:  Lab Results   Component Value Date    APPEARANCEUA Clear 06/10/2024    SGUA 1.014 06/10/2024    PROTEINUA Negative 06/10/2024    KETONESUA Negative 06/10/2024    LEUKOCYTESUR Negative 06/10/2024    RBCUA 0-5 06/10/2024    WBCUA 0-5 06/10/2024    BACTERIA None Seen 06/10/2024    SQEPUA Trace 06/10/2024    HYALINECASTS 0-2 (A) 06/10/2024        Future Appointments   Date Time Provider Department Center   10/23/2024 12:15 PM [facility]   3/13/2025 10:00 AM Jacoby Topete MD [id number]  [place]   9/10/2025 10:20 AM Jacoby Topete MD [id number]  [place]

## 2024-10-28 ENCOUNTER — TELEPHONE (OUTPATIENT)
Dept: INTERNAL MEDICINE | Facility: CLINIC | Age: 89
End: 2024-10-28
Payer: MEDICARE

## 2024-10-28 DIAGNOSIS — T14.8XXA OPEN WOUND: Primary | ICD-10-CM

## 2024-10-28 DIAGNOSIS — I87.2 VENOUS INSUFFICIENCY: ICD-10-CM

## 2024-10-28 DIAGNOSIS — R60.0 BILATERAL LOWER EXTREMITY EDEMA: Primary | ICD-10-CM

## 2024-10-29 RX ORDER — MUPIROCIN CALCIUM 20 MG/G
1 CREAM TOPICAL 3 TIMES DAILY
COMMUNITY
End: 2024-10-29 | Stop reason: SDUPTHER

## 2024-10-29 RX ORDER — MUPIROCIN CALCIUM 20 MG/G
1 CREAM TOPICAL 3 TIMES DAILY
Qty: 30 G | Refills: 2 | Status: SHIPPED | OUTPATIENT
Start: 2024-10-29

## 2024-10-31 ENCOUNTER — TELEPHONE (OUTPATIENT)
Dept: INTERNAL MEDICINE | Facility: CLINIC | Age: 89
End: 2024-10-31
Payer: MEDICARE

## 2024-10-31 DIAGNOSIS — R60.0 BILATERAL LOWER EXTREMITY EDEMA: Primary | ICD-10-CM

## 2024-10-31 DIAGNOSIS — R60.0 BILATERAL LOWER EXTREMITY EDEMA: ICD-10-CM

## 2024-10-31 RX ORDER — FUROSEMIDE 20 MG/1
20 TABLET ORAL DAILY
Qty: 30 TABLET | Refills: 0 | Status: SHIPPED | OUTPATIENT
Start: 2024-10-31

## 2024-10-31 RX ORDER — FUROSEMIDE 20 MG/1
20 TABLET ORAL DAILY
Qty: 30 TABLET | Refills: 0 | Status: SHIPPED | OUTPATIENT
Start: 2024-10-31 | End: 2024-10-31 | Stop reason: SDUPTHER

## 2024-11-06 ENCOUNTER — LAB VISIT (OUTPATIENT)
Dept: LAB | Facility: HOSPITAL | Age: 89
End: 2024-11-06
Attending: NURSE PRACTITIONER
Payer: MEDICARE

## 2024-11-06 DIAGNOSIS — R60.0 BILATERAL LOWER EXTREMITY EDEMA: ICD-10-CM

## 2024-11-06 LAB
ANION GAP SERPL CALC-SCNC: 8 MEQ/L
BUN SERPL-MCNC: 30.4 MG/DL (ref 9.8–20.1)
CALCIUM SERPL-MCNC: 9.9 MG/DL (ref 8.4–10.2)
CHLORIDE SERPL-SCNC: 109 MMOL/L (ref 98–107)
CO2 SERPL-SCNC: 26 MMOL/L (ref 23–31)
CREAT SERPL-MCNC: 1.09 MG/DL (ref 0.55–1.02)
CREAT/UREA NIT SERPL: 28
GFR SERPLBLD CREATININE-BSD FMLA CKD-EPI: 50 ML/MIN/1.73/M2
GLUCOSE SERPL-MCNC: 85 MG/DL (ref 82–115)
POTASSIUM SERPL-SCNC: 4.5 MMOL/L (ref 3.5–5.1)
SODIUM SERPL-SCNC: 143 MMOL/L (ref 136–145)

## 2024-11-06 PROCEDURE — 36415 COLL VENOUS BLD VENIPUNCTURE: CPT

## 2024-11-06 PROCEDURE — 80048 BASIC METABOLIC PNL TOTAL CA: CPT

## 2024-11-07 ENCOUNTER — TELEPHONE (OUTPATIENT)
Dept: INTERNAL MEDICINE | Facility: CLINIC | Age: 89
End: 2024-11-07
Payer: MEDICARE

## 2024-11-07 NOTE — TELEPHONE ENCOUNTER
----- Message from Shikha Grider NP sent at 11/6/2024  2:05 PM CST -----  Please let pt know that repeat labs are stable. How is swelling? Feeling ok with daily dosing of Lasix?

## 2024-11-07 NOTE — TELEPHONE ENCOUNTER
Spoke with Pt, information given, Pt expressed understanding. Pt stated the fluid pills are working well. Pt stated she is taking the medication daily, and is feeling better.

## 2024-11-08 RX ORDER — CLOBETASOL PROPIONATE 0.5 MG/G
CREAM TOPICAL
COMMUNITY
Start: 2024-10-31

## 2024-11-15 ENCOUNTER — TELEPHONE (OUTPATIENT)
Dept: INTERNAL MEDICINE | Facility: CLINIC | Age: 89
End: 2024-11-15
Payer: MEDICARE

## 2024-11-15 NOTE — TELEPHONE ENCOUNTER
According to the Appt Desk, patient is already scheduled on 11/21/24 w/ Dr. Wells--Vascular Surgeon

## 2024-11-15 NOTE — TELEPHONE ENCOUNTER
----- Message from Sona sent at 11/15/2024  9:32 AM CST -----  .Who Called: Danae Mora        Preferred Method of Contact: Phone Call  Patient's Preferred Phone Number on File: [phone]   Best Call Back Number, if different:  Additional Information: pt asking why she scheduled to vascular please advice

## 2024-11-15 NOTE — TELEPHONE ENCOUNTER
October 30, 2024  [facility]    10/30/24  7:43 AM  Note  Tried to contact patient this morning to verify she was able to  the Rx from the pharmacy, discuss referral     No Answer/Unable to leave          October 29, 2024         10/29/24  4:08 PM  You routed this conversation to Jacoby Topete MD  [facility]    10/29/24  4:08 PM  Note  Verbal per Shikha--Bactroban Cream     Rx sent to pharmacy         October 28, 2024  Shikha Grider, NP to Me       10/28/24  3:46 PM  Note  I sent referral to [facility] for eval/tx.  Would like to further eval for arterial disease given persistent weeping and sores.  Any s/s of infection?

## 2024-11-18 NOTE — PROGRESS NOTES
[facility] - Clinic Note  Shelton Wells MD      Patient Name: Danae Mora                   : 1935      MRN: [id number]   Visit Date: 2024       History Present Illness     Reason for Visit: Leg Swelling    Ms. Eusebio Mora presents to the clinic for evaluation of leg swelling/weeping with presence of sores. She is being referred by Shikha Grider NP for her PCP. She went to seen there due to leg pain and numbness that she is concerned about. She had an ultrasound that was negative for DVT. She voices not knowing she was supposed to be seen by Vascular Surgery. She was seen by a dermatologist who started her on a topical cream.     She reports swelling and sores to bilateral lower extremities. She reports her left leg is worse than her right leg. She reports onset of leg swelling was a month ago.  She denies compression usage or daily elevation. She denies history of DVT/PE. She reports interventions with Dr. Mayfield two years ago. She states she had 2 angiograms to her legs. She was originally referred there by Dr. Topete. She is unsure what particular symptom sent her to Dr. Mayfield. She has had left knee and right hip surgeries. She states she sleeps in the recliner.        REVIEW OF SYSTEMS:  Review of systems conducted, negative except as stated in the history of present illness. See HPI for details.        Physical Exam      Vitals:    24 1358 24 1401   BP: (!) 178/73 (!) 172/75   BP Location: Right arm Left arm   Patient Position: Sitting Sitting   Pulse: 75 75   Weight: 53.1 kg (117 lb)    Height: 5' (1.524 m)           General: well-nourished, no acute distress, and healthy appearing, ambulating normally, alert, conversant, oriented, and agitated  Neurologic: cranial nerves are grossly intact, no neurologic deficits, no motor deficits, and no sensory deficits  HEENT: grossly normal and no scleral icterus  Neck/Chest: normal  and soft without  lymphadenopathy  Respiratory: breathing easily and without respiratory distress  Abdomen: normal and soft  Cardiology: regular rate and rhythm    Upper Extremity Arterial Exam:   Right - radial is palpable  Left - radial is palpable    Lower Extremity Arterial Exam:  Right - posterior tibial is present with doppler and dorsalis pedis is present with doppler  Left - posterior tibial is present with doppler and dorsalis pedis is present with doppler    Skin:   Mild redness to lower leg  No hyperpigmentation  Skin is soft  Some edema   No varicosities    Measurements: 9 inches (ankle), 13 inches (calf), 14 inches (thigh)          Assessment and Plan     Ms. Eusebio Mora is an 88 y.o. with left leg swelling suggestive of lymphedema.    Recommend obtaining left lower extremity venous duplex for evaluation of venous insufficiency. Educated on use of compression therapy (20-30 mmHg) as well as appropriate intermittent leg elevation.    If venous insufficiency is not evident on ultrasound testing, she would benefit from evaluation with [facility].         1. Localized swelling, mass and lump, lower limb, left  - Ambulatory referral/consult to Vascular Surgery    2. Venous insufficiency  - Ambulatory referral/consult to Vascular Surgery          Imaging Obtained/Reviewed           Medical History     Past Medical History:   Diagnosis Date    Bilateral lower extremity edema     Glaucoma     History of degenerative disc disease     History of DVT (deep vein thrombosis)     Rheumatoid arthritis     Scoliosis      Past Surgical History:   Procedure Laterality Date    HIP ARTHROPLASTY Right     KNEE ARTHROPLASTY Left      No family history on file.  Social History     Socioeconomic History    Marital status:    Tobacco Use    Smoking status: Never    Smokeless tobacco: Never     Social Drivers of Health     Financial Resource Strain: Low Risk  (6/17/2024)    Overall Financial Resource Strain  (CARDIA)     Difficulty of Paying Living Expenses: Not hard at all   Food Insecurity: No Food Insecurity (6/17/2024)    Hunger Vital Sign     Worried About Running Out of Food in the Last Year: Never true     Ran Out of Food in the Last Year: Never true   Transportation Needs: No Transportation Needs (6/17/2024)    TRANSPORTATION NEEDS     Transportation : No   Physical Activity: Insufficiently Active (6/17/2024)    Exercise Vital Sign     Days of Exercise per Week: 2 days     Minutes of Exercise per Session: 10 min   Stress: No Stress Concern Present (6/17/2024)    [employer] - Occupational Stress Questionnaire     Feeling of Stress : Not at all   Housing Stability: Low Risk  (6/17/2024)    Housing Stability Vital Sign     Unable to Pay for Housing in the Last Year: No     Homeless in the Last Year: No     Current Outpatient Medications   Medication Instructions    amLODIPine (NORVASC) 5 mg, Oral, 2 times daily    apixaban (ELIQUIS) 2.5 mg, Oral, 2 times daily    clobetasoL (TEMOVATE) 0.05 % cream Apply sparingly to lower legs twice a day for two weeks then every other day for two weeks then twice weekly for one month if needed as directed. Avoid face and genitalia.    furosemide (LASIX) 20 mg, Oral, Daily    gabapentin (NEURONTIN) 300 mg, Oral, 2 times daily    hydroxychloroquine (PLAQUENIL) 200 mg, Oral, Daily    LUMIGAN 0.01 % Drop Place into both eyes.    metoprolol succinate (TOPROL-XL) 50 mg, Oral, Daily    mupirocin calcium 2% (BACTROBAN) 1 g, Topical (Top), 3 times daily     Review of patient's allergies indicates:   Allergen Reactions    Celebrex [celecoxib] Anaphylaxis    Gabapentin     Sulfamethoxazole        Patient Care Team:  Jacoby Topete MD as PCP - General (Internal Medicine)        No follow-ups on file. In addition to their scheduled follow up, the patient has also been instructed to follow up on as needed basis.     Future Appointments   Date Time Provider  [facility]   3/13/2025 10:00 AM Jacoby Topete MD [id number]   9/10/2025 10:20 AM Jacoby Topete MD [id number]

## 2024-11-19 ENCOUNTER — TELEPHONE (OUTPATIENT)
Dept: INTERNAL MEDICINE | Facility: CLINIC | Age: 89
End: 2024-11-19
Payer: MEDICARE

## 2024-11-19 NOTE — TELEPHONE ENCOUNTER
"Spoke to patient, advised her that I contacted her TWICE to discuss the Referral and when we SPOKE on 10/29/24 WHEN she requested the Cream for her legs, she was made aware of the referral on that date.  She did not really want to go to the Vascular Doctor but WAS made aware at that time that per Shikha "I will send in the Bactroban Cream but I would still like her to be evaluated".  And she Agreed.     She VERBALIZED Understanding   "

## 2024-11-19 NOTE — TELEPHONE ENCOUNTER
Please see Previous Message:    November 19, 2024  [facility]    11/19/24  8:18 AM  Note  Tried to contact patient again today to make her aware of reason for referral--No Answer/Unable to leave          November 15, 2024         11/15/24 10:54 AM  You routed this conversation to [facility]    11/15/24 10:54 AM  Note  October 30, 2024  [facility]    10/30/24  7:43 AM  Note  Tried to contact patient this morning to verify she was able to  the Rx from the pharmacy, discuss referral     No Answer/Unable to leave           October 29, 2024         10/29/24  4:08 PM  You routed this conversation to Jacoby Topete MD  [facility]    10/29/24  4:08 PM  Note  Verbal per Shikha--Bactroban Cream     Rx sent to pharmacy          October 28, 2024  Shikha Grider, NP to Me        10/28/24  3:46 PM  Note  I sent referral to [facility] for eval/tx.  Would like to further eval for arterial disease given persistent weeping and sores.  Any s/s of infection?                          11/15/24  9:37 AM  You routed this conversation to [facility]    11/15/24  9:37 AM  Note  According to the Appt Desk, patient is already scheduled on 11/21/24 w/ Dr. Wells--Vascular Surgeon         [facility]    11/15/24  9:37 AM  Note  ----- Message from Sona sent at 11/15/2024  9:32 AM CST -----  .Who Called: Danae Mora           Preferred Method of Contact: Phone Call  Patient's Preferred Phone Number on File: [phone]   Best Call Back Number, if different:  Additional Information: pt asking why she scheduled to vascular please advice

## 2024-11-19 NOTE — TELEPHONE ENCOUNTER
----- Message from Chace sent at 11/19/2024 10:16 AM CST -----  Who Called: Danae Mora    Caller is requesting assistance/information from provider's office.    Symptoms (please be specific):    How long has patient had these symptoms:    List of preferred pharmacies on file (remove unneeded): [unfilled]  If different, enter pharmacy into here including location and phone number:       Patient's Preferred Phone Number on File: [phone]   Best Call Back Number, if different:  Additional Information: pt called to determine why she was not contacted before vascular appt was scheduled, req call back  from Pierre   No difficulties

## 2024-11-21 ENCOUNTER — OFFICE VISIT (OUTPATIENT)
Dept: VASCULAR SURGERY | Facility: CLINIC | Age: 89
End: 2024-11-21
Payer: MEDICARE

## 2024-11-21 VITALS
BODY MASS INDEX: 22.97 KG/M2 | SYSTOLIC BLOOD PRESSURE: 172 MMHG | WEIGHT: 117 LBS | DIASTOLIC BLOOD PRESSURE: 75 MMHG | HEIGHT: 60 IN | HEART RATE: 75 BPM

## 2024-11-21 DIAGNOSIS — R22.42 LOCALIZED SWELLING, MASS AND LUMP, LOWER LIMB, LEFT: ICD-10-CM

## 2024-11-21 PROCEDURE — 99203 OFFICE O/P NEW LOW 30 MIN: CPT | Mod: ,,, | Performed by: SPECIALIST

## 2024-11-21 PROCEDURE — 1101F PT FALLS ASSESS-DOCD LE1/YR: CPT | Mod: CPTII,,, | Performed by: SPECIALIST

## 2024-11-21 PROCEDURE — 3288F FALL RISK ASSESSMENT DOCD: CPT | Mod: CPTII,,, | Performed by: SPECIALIST

## 2024-11-21 PROCEDURE — 1159F MED LIST DOCD IN RCRD: CPT | Mod: CPTII,,, | Performed by: SPECIALIST

## 2024-11-21 PROCEDURE — 1160F RVW MEDS BY RX/DR IN RCRD: CPT | Mod: CPTII,,, | Performed by: SPECIALIST

## 2024-11-21 NOTE — LETTER
[facility]           Medical Record Request  Date: 2024      PLEASE PROVIDE ALL INFORMATION AS REQUESTED FOR:      Ms. Danae Mora    : 1935       Please send most recent clinic note, arterial/venous ultrasounds, and procedure notes for review.    Thank you for your help in this matter.    Sincerely,        Shelton Wells MD  [facility]  Phone: [phone]  Fax: [phone]

## 2024-11-25 ENCOUNTER — TELEPHONE (OUTPATIENT)
Dept: VASCULAR SURGERY | Facility: CLINIC | Age: 89
End: 2024-11-25
Payer: MEDICARE

## 2024-11-26 ENCOUNTER — TELEPHONE (OUTPATIENT)
Dept: INTERNAL MEDICINE | Facility: CLINIC | Age: 89
End: 2024-11-26
Payer: MEDICARE

## 2024-11-26 NOTE — TELEPHONE ENCOUNTER
Spoke to FABIENNE Deng with Dr. Sosa's office.  Please let her know I am in agreement with referral to outpatient lymphedema tx with [facility] OR referral to Dr. [facility]ft for lymphedema tx. Please speak with patient regarding this and determine if she would like for Dr. Sosa's office to manage or we can refer. And please tell her I said Happy Thanksgiving :)

## 2024-11-26 NOTE — TELEPHONE ENCOUNTER
Venous ultrasound reviewed by Dr. Wells. No evidence of deep vein thrombosis or significant reflux. No venous intervention is indicated. Advised referral to [facility] for lymphedema management. Provided these recommendations to Ms. Mora. She is unsure if she would like to proceed with this referral and questioned ability to undergone in home therapy with home health. Advised outpatient therapy would provide better quality of therapy for total management but if travel is of concern can consider lymphedema management with in home physical therapy. She does verbalizes understanding that this may be of less benefit. Discussed use of compression therapy. She states she has been attempting to apply compression at night before bed but is meeting challenges. Advised elevation through the night and at sometime midday in the appropriate supine position and attempting to apply her compression first thing in the morning. She would like to discuss the recommendations further with referring provider, Shikha Grider on Monday. Advised her to contact us if she would like referral to [facility] once speaking with their office. She verbalizes understanding of these recommendations.

## 2024-11-27 NOTE — TELEPHONE ENCOUNTER
Tried to contact patient to further discuss next plan of care--No Answer/Unable to leave VM, Will contact patient again at a later time to discuss

## 2025-01-08 ENCOUNTER — OFFICE VISIT (OUTPATIENT)
Dept: INTERNAL MEDICINE | Facility: CLINIC | Age: 89
End: 2025-01-08
Payer: MEDICARE

## 2025-01-08 VITALS
SYSTOLIC BLOOD PRESSURE: 145 MMHG | OXYGEN SATURATION: 98 % | HEIGHT: 60 IN | DIASTOLIC BLOOD PRESSURE: 88 MMHG | HEART RATE: 85 BPM | BODY MASS INDEX: 22.85 KG/M2

## 2025-01-08 DIAGNOSIS — G62.9 NEUROPATHY: ICD-10-CM

## 2025-01-08 DIAGNOSIS — L98.9 SKIN LESIONS: ICD-10-CM

## 2025-01-08 DIAGNOSIS — K21.00 GASTROESOPHAGEAL REFLUX DISEASE WITH ESOPHAGITIS WITHOUT HEMORRHAGE: ICD-10-CM

## 2025-01-08 DIAGNOSIS — I10 PRIMARY HYPERTENSION: Primary | ICD-10-CM

## 2025-01-08 DIAGNOSIS — M06.9 RHEUMATOID ARTHRITIS, INVOLVING UNSPECIFIED SITE, UNSPECIFIED WHETHER RHEUMATOID FACTOR PRESENT: ICD-10-CM

## 2025-01-08 PROCEDURE — 99214 OFFICE O/P EST MOD 30 MIN: CPT | Mod: ,,, | Performed by: INTERNAL MEDICINE

## 2025-01-08 PROCEDURE — 1159F MED LIST DOCD IN RCRD: CPT | Mod: CPTII,,, | Performed by: INTERNAL MEDICINE

## 2025-01-08 PROCEDURE — 1126F AMNT PAIN NOTED NONE PRSNT: CPT | Mod: CPTII,,, | Performed by: INTERNAL MEDICINE

## 2025-01-08 PROCEDURE — 1101F PT FALLS ASSESS-DOCD LE1/YR: CPT | Mod: CPTII,,, | Performed by: INTERNAL MEDICINE

## 2025-01-08 PROCEDURE — 1160F RVW MEDS BY RX/DR IN RCRD: CPT | Mod: CPTII,,, | Performed by: INTERNAL MEDICINE

## 2025-01-08 PROCEDURE — 3288F FALL RISK ASSESSMENT DOCD: CPT | Mod: CPTII,,, | Performed by: INTERNAL MEDICINE

## 2025-01-08 RX ORDER — TRAMADOL HYDROCHLORIDE 50 MG/1
50 TABLET ORAL EVERY 6 HOURS PRN
Qty: 14 TABLET | Refills: 0 | Status: SHIPPED | OUTPATIENT
Start: 2025-01-08 | End: 2025-01-08 | Stop reason: SDUPTHER

## 2025-01-08 RX ORDER — TRAMADOL HYDROCHLORIDE 50 MG/1
50 TABLET ORAL EVERY 6 HOURS PRN
Qty: 14 TABLET | Refills: 0 | Status: SHIPPED | OUTPATIENT
Start: 2025-01-08

## 2025-01-08 RX ORDER — TACROLIMUS 1 MG/G
OINTMENT TOPICAL
COMMUNITY
Start: 2024-12-04

## 2025-01-08 NOTE — PROGRESS NOTES
Patient ID: Danae Mora is a 89 y.o. female.  Chief Complaint: Other Misc (Neuropathy)    HPI:   88 yo female   neuropathy , recent  skin lesions lower extremity , has seen vascular , then dermatologist.  Skin lesios were oozing , I recommend   Tracolimus  .     Patient  cannot  tolerate   Clarence Hoses .     Current Outpatient Medications:     amLODIPine (NORVASC) 5 MG tablet, Take 1 tablet (5 mg total) by mouth 2 (two) times a day., Disp: 180 tablet, Rfl: 3    apixaban (ELIQUIS) 2.5 mg Tab, Take 1 tablet (2.5 mg total) by mouth 2 (two) times daily., Disp: 180 tablet, Rfl: 3    clobetasoL (TEMOVATE) 0.05 % cream, Apply sparingly to lower legs twice a day for two weeks then every other day for two weeks then twice weekly for one month if needed as directed. Avoid face and genitalia., Disp: , Rfl:     furosemide (LASIX) 20 MG tablet, Take 1 tablet (20 mg total) by mouth once daily., Disp: 30 tablet, Rfl: 0    gabapentin (NEURONTIN) 300 MG capsule, Take 1 capsule (300 mg total) by mouth 2 (two) times daily., Disp: 90 capsule, Rfl: 3    hydroxychloroquine (PLAQUENIL) 200 mg tablet, Take 1 tablet (200 mg total) by mouth once daily., Disp: 90 tablet, Rfl: 3    LUMIGAN 0.01 % Drop, Place into both eyes., Disp: , Rfl:     metoprolol succinate (TOPROL-XL) 50 MG 24 hr tablet, Take 1 tablet (50 mg total) by mouth once daily., Disp: 90 tablet, Rfl: 3    mupirocin calcium 2% (BACTROBAN) 2 % cream, Apply 1 g topically 3 (three) times daily., Disp: 30 g, Rfl: 2    tacrolimus (PROTOPIC) 0.1 % ointment, SMARTSIG:sparingly Topical Twice Daily, Disp: , Rfl:   ROS:   Constitutional: No weight gain, No fever, No chills, No fatigue.   Eyes: No blurring, No visual disturbances.   Ear/Nose/Mouth/Throat: No decreased hearing, No ear pain, No nasal congestion, No sore throat.   Respiratory: No shortness of breath, No cough, No wheezing.   Cardiovascular: No chest pain, No palpitations, No peripheral edema.   Gastrointestinal: No nausea,  No vomiting, No diarrhea, No constipation, No abdominal pain.   Genitourinary: No dysuria, No hematuria.   Hematology/Lymphatics: No bruising tendency, No bleeding tendency, No swollen lymph glands.   Endocrine: No excessive thirst, No polyuria, No excessive hunger.   Musculoskeletal:  difuse joint pain, No muscle pain, No decreased range of motion.   Integumentary: No rash, No pruritus.   Skin was oozing    Neurologic: No abnormal balance, No confusion, No headache.   Psychiatric: No anxiety, No depression, Not suicidal, No hallucinations.    PE/Vitals:   BP (!) 145/88 (BP Location: Left arm, Patient Position: Sitting)   Pulse 85   Ht 5' (1.524 m)   SpO2 98%   BMI 22.85 kg/m²   General: Alert and oriented, No acute distress.   Eye: Normal conjunctiva without exudate.  HENMT: Normocephalic/AT, Normal hearing, Oral mucosa is moist and pink   Neck: No goiter visualized.   Respiratory: Lungs CTAB, Respirations are non-labored, Breath sounds are equal, Symmetrical chest wall expansion.  Cardiovascular: Normal rate, Regular rhythm, No murmur, No edema.   Gastrointestinal: Non-distended.   Genitourinary: Deferred.  Musculoskeletal: Normal ROM, Normal gait, No deformities or amputations.  Integumentary: Warm, Dry, Intact. No diaphoresis, or flushing.  Neurologic: No focal deficits, Cranial Nerves II-XII are grossly intact.   Psychiatric: Cooperative, Appropriate mood & affect, Normal judgment, Non-suicidal.  Assessment/Plan   1. Primary hypertension  Comments:  on metoprolol  and  amlodipine    2. Rheumatoid arthritis, involving unspecified site, unspecified whether rheumatoid factor present  Comments:  on Plaquenil  some  diffuse  artrlagias    3. Gastroesophageal reflux disease with esophagitis without hemorrhage  Comments:  diet controlled  ,  asymptomatic    4. Neuropathy  Comments:  on neurontin  still acting up      No orders of the defined types were placed in this encounter.    Education and counseling done  face to face regarding medical conditions and plan. Contact office if new symptoms develop. Should any symptoms ever significantly worsen seek emergency medical attention/go to ER. Follow up at least yearly for wellness or sooner PRN. Nurse to call patient with any results. The patient is receptive, expresses understanding and is agreeable to plan. All questions have been answered.  No follow-ups on file.

## 2025-03-05 ENCOUNTER — TELEPHONE (OUTPATIENT)
Dept: INTERNAL MEDICINE | Facility: CLINIC | Age: 89
End: 2025-03-05
Payer: MEDICARE

## 2025-03-05 NOTE — TELEPHONE ENCOUNTER
----- Message from Nurse Suarez sent at 2/27/2025  7:40 AM CST -----  Regarding: Dr. Topete 03/13/2025 6 mth 1000  Are there any outstanding tasks in chart? NoIs there any documentation of tasks? NoHas the pt seen another physician, been to ER, [facility], or admitted to hospital since last visit?Has the pt done blood work or imaging since last visit? 5. PLEASE HAVE PATIENT BRING MEDICATION LIST OR BOTTLES TO EVERY OFFICE VISIT

## 2025-03-13 ENCOUNTER — OFFICE VISIT (OUTPATIENT)
Dept: INTERNAL MEDICINE | Facility: CLINIC | Age: 89
End: 2025-03-13
Payer: MEDICARE

## 2025-03-13 VITALS
HEIGHT: 60 IN | SYSTOLIC BLOOD PRESSURE: 110 MMHG | DIASTOLIC BLOOD PRESSURE: 80 MMHG | BODY MASS INDEX: 21.01 KG/M2 | WEIGHT: 107 LBS | OXYGEN SATURATION: 94 % | HEART RATE: 65 BPM

## 2025-03-13 DIAGNOSIS — Z86.718 HISTORY OF DVT (DEEP VEIN THROMBOSIS): ICD-10-CM

## 2025-03-13 DIAGNOSIS — G47.09 OTHER INSOMNIA: ICD-10-CM

## 2025-03-13 DIAGNOSIS — L98.9 SKIN LESIONS: ICD-10-CM

## 2025-03-13 DIAGNOSIS — F33.0 MILD EPISODE OF RECURRENT MAJOR DEPRESSIVE DISORDER: ICD-10-CM

## 2025-03-13 DIAGNOSIS — T14.8XXA OPEN WOUND: ICD-10-CM

## 2025-03-13 DIAGNOSIS — R06.02 SHORTNESS OF BREATH: Primary | ICD-10-CM

## 2025-03-13 DIAGNOSIS — M79.642 BILATERAL HAND PAIN: ICD-10-CM

## 2025-03-13 DIAGNOSIS — M79.641 BILATERAL HAND PAIN: ICD-10-CM

## 2025-03-13 DIAGNOSIS — G62.9 NEUROPATHY: ICD-10-CM

## 2025-03-13 DIAGNOSIS — R63.4 WEIGHT LOSS: ICD-10-CM

## 2025-03-13 PROCEDURE — 3288F FALL RISK ASSESSMENT DOCD: CPT | Mod: CPTII,,, | Performed by: NURSE PRACTITIONER

## 2025-03-13 PROCEDURE — 99215 OFFICE O/P EST HI 40 MIN: CPT | Mod: ,,, | Performed by: NURSE PRACTITIONER

## 2025-03-13 PROCEDURE — 1160F RVW MEDS BY RX/DR IN RCRD: CPT | Mod: CPTII,,, | Performed by: NURSE PRACTITIONER

## 2025-03-13 PROCEDURE — 1101F PT FALLS ASSESS-DOCD LE1/YR: CPT | Mod: CPTII,,, | Performed by: NURSE PRACTITIONER

## 2025-03-13 PROCEDURE — 1125F AMNT PAIN NOTED PAIN PRSNT: CPT | Mod: CPTII,,, | Performed by: NURSE PRACTITIONER

## 2025-03-13 PROCEDURE — 1159F MED LIST DOCD IN RCRD: CPT | Mod: CPTII,,, | Performed by: NURSE PRACTITIONER

## 2025-03-13 PROCEDURE — G2211 COMPLEX E/M VISIT ADD ON: HCPCS | Mod: ,,, | Performed by: NURSE PRACTITIONER

## 2025-03-13 RX ORDER — MUPIROCIN CALCIUM 20 MG/G
1 CREAM TOPICAL 3 TIMES DAILY
Qty: 30 G | Refills: 2 | Status: SHIPPED | OUTPATIENT
Start: 2025-03-13

## 2025-03-13 RX ORDER — ESCITALOPRAM OXALATE 5 MG/1
5 TABLET ORAL DAILY
Qty: 90 TABLET | Refills: 3 | Status: SHIPPED | OUTPATIENT
Start: 2025-03-13 | End: 2026-03-13

## 2025-03-13 NOTE — PROGRESS NOTES
"Subjective:      Patient ID: Danea Mora is a 89 y.o. female.    Chief Complaint: Follow-up (6 month)      HPI: Patient here today for 6 month follow up. At LOV with Dr. Topete, given tramadol. She takes this as needed with some relief. She is concerned to take medication. She has concerns about intermittent, recurrent BLE sores, bilateral hand numbness with dexterity changes. Some hand pain. She has low o2 today. Some occ SOB. She has been practicing exercises given to her daily. Denies wheezing or CP. Some coughing. Issues with sleep--she goes to bed around 8-8:30 and awakens at 10:00 PM and "makes coffee". She is concerned about driving and safety.  She lives independently. She was disgruntled with me for referring her to Dr. Sosa and distance she had to drive. Unintended weight loss noted. She does not eat 3 meals/day.    Visit today included increased complexity associated with the care of the episodic problem SOB, depression, weight loss addressed and managing the longitudinal care of the patient due to the serious and/or complex managed problem(s).     Review of patient's allergies indicates:   Allergen Reactions    Celebrex [celecoxib] Anaphylaxis    Gabapentin     Sulfamethoxazole        Review of Systems  Constitutional: No fever, No chills, No sweats, No fatigue, No weight loss.  Ear/Nose/Mouth/Throat: No nasal congestion, No vertigo.  Respiratory: No shortness of breath, cough, No sputum production, No wheezing, exertional dyspnea.   Cardiovascular: No chest pain, No palpitations  Gastrointestinal: No nausea, No vomiting, No diarrhea, No rectal bleeding, No constipation, No abdominal pain.  Genitourinary: No dysuria, No hematuria, No frequency.  Musculoskeletal: Eulalio hand pain  Integumentary: No rash, No ecchymosis.  Neurologic: No altered mental status, No headaches.  Psychiatrics: No anxiety,No depression, No SI/HI.    Objective:   Visit Vitals  /80 (BP Location: Right arm, Patient " Position: Sitting)   Pulse 65   Ht 5' (1.524 m)   Wt 48.5 kg (107 lb)   SpO2 (!) 94%   BMI 20.90 kg/m²     The patient's weight trend is below:   Wt Readings from Last 4 Encounters:   03/13/25 48.5 kg (107 lb)   11/21/24 53.1 kg (117 lb)   10/22/24 53.1 kg (117 lb)   09/05/24 52.2 kg (115 lb)        Physical Exam  Vitals and nursing note reviewed.   Constitutional:       General: She is not in acute distress.     Appearance: Normal appearance. She is normal weight. She is not ill-appearing, toxic-appearing or diaphoretic.   HENT:      Head: Normocephalic and atraumatic.   Cardiovascular:      Rate and Rhythm: Normal rate and regular rhythm.      Heart sounds: Normal heart sounds.   Pulmonary:      Effort: Pulmonary effort is normal.      Breath sounds: Normal breath sounds.   Abdominal:      General: Abdomen is flat. Bowel sounds are normal. There is no distension.      Palpations: Abdomen is soft. There is no mass.      Tenderness: There is no abdominal tenderness. There is no guarding or rebound.      Hernia: No hernia is present.   Skin:     General: Skin is warm.   Neurological:      General: No focal deficit present.      Mental Status: She is alert and oriented to person, place, and time. Mental status is at baseline.   Psychiatric:         Mood and Affect: Mood normal.         Behavior: Behavior normal.         Thought Content: Thought content normal.         Judgment: Judgment normal.         Assessment/Plan:   1. 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  Dispense: 90 tablet; Refill: 3    3. Skin lesions  Assessment & Plan:  Previously seen by Derm, Dr. Roman  Will refill mupirocin  Continue to monitor for infection      4. Neuropathy  Assessment & Plan:  Continue current dosage of gabapentin      5. Other insomnia  Assessment & Plan:  Stop drinking coffee in PM  Good sleep hygiene  Avoid daytime napping      6. Weight loss  Assessment & Plan:  Small frequent meals, protein dense  Follow up 1 month for recheck      7. History of DVT (deep vein thrombosis)  Assessment & Plan:  OAC Eliquis 2.5mg BID  No s/s of bleeding      8. Bilateral hand pain  Assessment & Plan:  Wrist braces  Tylenol/Tramadol as needed      9. Open wound  -     mupirocin calcium 2% (BACTROBAN) 2 % cream; Apply 1 g topically 3 (three) times daily.  Dispense: 30 g; Refill: 2       I spent a total of 60 minutes on the day of the visit.This includes face to face time and non-face to face time preparing to see the patient (eg, review of tests), obtaining and/or reviewing separately obtained history, documenting clinical information in the electronic or other health record, independently interpreting results and communicating results to the patient/family/caregiver, or care coordinator.  Medication List with Changes/Refills   New Medications    ESCITALOPRAM OXALATE (LEXAPRO) 5 MG TAB    Take 1 tablet (5 mg total) by mouth once daily.   Current Medications    AMLODIPINE (NORVASC) 5 MG TABLET    Take 1 tablet (5 mg total) by mouth 2 (two) times a day.    APIXABAN (ELIQUIS) 2.5 MG TAB    Take 1 tablet (2.5 mg total) by mouth 2 (two) times daily.    CLOBETASOL (TEMOVATE) 0.05 % CREAM    Apply sparingly to lower legs twice a day for two weeks then every other day for two weeks then twice weekly for one month if needed as directed. Avoid face and genitalia.    FUROSEMIDE (LASIX) 20 MG TABLET    Take 1 tablet (20 mg total) by mouth once daily.    GABAPENTIN (NEURONTIN) 300 MG  CAPSULE    Take 1 capsule (300 mg total) by mouth 2 (two) times daily.    HYDROXYCHLOROQUINE (PLAQUENIL) 200 MG TABLET    Take 1 tablet (200 mg total) by mouth once daily.    LUMIGAN 0.01 % DROP    Place into both eyes.    METOPROLOL SUCCINATE (TOPROL-XL) 50 MG 24 HR TABLET    Take 1 tablet (50 mg total) by mouth once daily.    TACROLIMUS (PROTOPIC) 0.1 % OINTMENT    SMARTSIG:sparingly Topical Twice Daily    TRAMADOL (ULTRAM) 50 MG TABLET    Take 1 tablet (50 mg total) by mouth every 6 (six) hours as needed for Pain.   Changed and/or Refilled Medications    Modified Medication Previous Medication    MUPIROCIN CALCIUM 2% (BACTROBAN) 2 % CREAM mupirocin calcium 2% (BACTROBAN) 2 % cream       Apply 1 g topically 3 (three) times daily.    Apply 1 g topically 3 (three) times daily.        Follow up in about 4 weeks (around 4/10/2025) for Anxiety/Depression Recheck.    Chemistry:  Lab Results   Component Value Date     11/06/2024    K 4.5 11/06/2024    BUN 30.4 (H) 11/06/2024    CREATININE 1.09 (H) 11/06/2024    EGFRNORACEVR 50 11/06/2024    GLUCOSE 85 11/06/2024    CALCIUM 9.9 11/06/2024    ALKPHOS 70 08/16/2024    LABPROT 7.3 08/16/2024    ALBUMIN 3.6 08/16/2024    BILIDIR 0.2 04/05/2022    IBILI 0.20 04/05/2022    AST 13 08/16/2024    ALT 15 08/16/2024        Lab Results   Component Value Date    HGBA1C 5.1 08/16/2024        Hematology:  Lab Results   Component Value Date    WBC 6.89 08/16/2024    HGB 13.2 08/16/2024    HCT 39.6 08/16/2024     08/16/2024       Lipid Panel:  Lab Results   Component Value Date    CHOL 221 (H) 08/16/2024    HDL 58 08/16/2024    .00 (H) 08/16/2024    TRIG 110 08/16/2024    TOTALCHOLEST 4 08/16/2024        Urine:  Lab Results   Component Value Date    APPEARANCEUA Clear 06/10/2024    SGUA 1.014 06/10/2024    PROTEINUA Negative 06/10/2024    KETONESUA Negative 06/10/2024    LEUKOCYTESUR Negative 06/10/2024    RBCUA 0-5 06/10/2024    WBCUA 0-5 06/10/2024    BACTERIA None  Seen 06/10/2024    SQEPUA Trace 06/10/2024    HYALINECASTS 0-2 (A) 06/10/2024        Future Appointments   Date Time Provider Department Center   4/14/2025 10:40 AM Shikha Grider NP [facility]   9/10/2025 10:20 AM Jacoby Topete MD [facility]

## 2025-03-13 NOTE — ASSESSMENT & PLAN NOTE
Continue current dosage of gabapentin  
Initiate Lexapro 5mg daily  Follow up one month and as needed  
OAC Eliquis 2.5mg BID  No s/s of bleeding  
Previously seen by Derm, Dr. Roman  Will refill mupirocin  Continue to monitor for infection  
Small frequent meals, protein dense  Follow up 1 month for recheck  
Stop drinking coffee in PM  Good sleep hygiene  Avoid daytime napping  
Work up with labs and CXR  
Wrist braces  Tylenol/Tramadol as needed  
1-2 drinks

## 2025-03-17 ENCOUNTER — RESULTS FOLLOW-UP (OUTPATIENT)
Dept: INTERNAL MEDICINE | Facility: CLINIC | Age: 89
End: 2025-03-17

## 2025-03-17 ENCOUNTER — HOSPITAL ENCOUNTER (OUTPATIENT)
Dept: RADIOLOGY | Facility: HOSPITAL | Age: 89
Discharge: HOME | End: 2025-03-17
Attending: NURSE PRACTITIONER
Payer: MEDICARE

## 2025-03-17 DIAGNOSIS — R06.02 SHORTNESS OF BREATH: ICD-10-CM

## 2025-03-17 PROCEDURE — 71046 X-RAY EXAM CHEST 2 VIEWS: CPT | Mod: TC

## 2025-04-07 ENCOUNTER — TELEPHONE (OUTPATIENT)
Dept: INTERNAL MEDICINE | Facility: CLINIC | Age: 89
End: 2025-04-07
Payer: MEDICARE

## 2025-04-07 NOTE — TELEPHONE ENCOUNTER
----- Message from Med Assistant Reyes sent at 4/7/2025  8:26 AM CDT -----  Regarding: [facility] PREVISIT 4/14/2025 @ 10:40  ARE THERE ANY OUTSTANDING TASKS IN THE CHART? NO LAB NEEDEDIS THERE ANY DOCUMENTATION OF TASKS?HAS PATIENT SEEN ANOTHER PHYSICIAN, BEEN TO THE ER, UCC, OR ADMITTED TO HOSPITAL SINCE LAST VISIT?HAS THE PATIENT DONE BLOOD WORK OR IMAGING SINCE LAST VISIT?PLEASE HAVE PATIENT BRING A LIST OF MEDICATIONS TO APPT

## 2025-04-14 ENCOUNTER — PATIENT MESSAGE (OUTPATIENT)
Dept: INTERNAL MEDICINE | Facility: CLINIC | Age: 89
End: 2025-04-14

## 2025-04-14 ENCOUNTER — OFFICE VISIT (OUTPATIENT)
Dept: INTERNAL MEDICINE | Facility: CLINIC | Age: 89
End: 2025-04-14
Payer: MEDICARE

## 2025-04-14 VITALS
DIASTOLIC BLOOD PRESSURE: 80 MMHG | WEIGHT: 106 LBS | SYSTOLIC BLOOD PRESSURE: 128 MMHG | HEART RATE: 98 BPM | HEIGHT: 60 IN | BODY MASS INDEX: 20.81 KG/M2 | OXYGEN SATURATION: 99 %

## 2025-04-14 DIAGNOSIS — F33.0 MILD EPISODE OF RECURRENT MAJOR DEPRESSIVE DISORDER: ICD-10-CM

## 2025-04-14 DIAGNOSIS — N32.81 OAB (OVERACTIVE BLADDER): ICD-10-CM

## 2025-04-14 DIAGNOSIS — R63.4 WEIGHT LOSS: ICD-10-CM

## 2025-04-14 DIAGNOSIS — Z91.81 AT HIGH RISK FOR FALLS: ICD-10-CM

## 2025-04-14 DIAGNOSIS — G62.9 NEUROPATHY: ICD-10-CM

## 2025-04-14 DIAGNOSIS — R26.81 UNSTABLE GAIT: Primary | ICD-10-CM

## 2025-04-14 PROCEDURE — 3288F FALL RISK ASSESSMENT DOCD: CPT | Mod: CPTII,,, | Performed by: NURSE PRACTITIONER

## 2025-04-14 PROCEDURE — G2211 COMPLEX E/M VISIT ADD ON: HCPCS | Mod: ,,, | Performed by: NURSE PRACTITIONER

## 2025-04-14 PROCEDURE — 1101F PT FALLS ASSESS-DOCD LE1/YR: CPT | Mod: CPTII,,, | Performed by: NURSE PRACTITIONER

## 2025-04-14 PROCEDURE — 1160F RVW MEDS BY RX/DR IN RCRD: CPT | Mod: CPTII,,, | Performed by: NURSE PRACTITIONER

## 2025-04-14 PROCEDURE — 99214 OFFICE O/P EST MOD 30 MIN: CPT | Mod: ,,, | Performed by: NURSE PRACTITIONER

## 2025-04-14 PROCEDURE — 1159F MED LIST DOCD IN RCRD: CPT | Mod: CPTII,,, | Performed by: NURSE PRACTITIONER

## 2025-04-14 PROCEDURE — 1126F AMNT PAIN NOTED NONE PRSNT: CPT | Mod: CPTII,,, | Performed by: NURSE PRACTITIONER

## 2025-04-14 RX ORDER — MIRABEGRON 50 MG/1
50 TABLET, FILM COATED, EXTENDED RELEASE ORAL DAILY
COMMUNITY

## 2025-04-14 NOTE — PROGRESS NOTES
Subjective:      Patient ID: Danae Mora is a 89 y.o. female.    Chief Complaint: Follow-up (4 week) and Unstable Gait      HPI: Patient here today for 4 week recheck of depression and unsteady gait. Presents today with son. Increased concerns with continued driving. She has not been consistent with administration of Lexparo.  Unsure if depress/anxious. Continued weight loss noted, although she is reportedly eating well.  No CP, palpitations, SOB, abdominal pain, changes in bowels. She is seeing Urology regarding OAB, on medication (does not remember name or dosage--samples given at Urology office).  Son would like referral to  for PT/OT/SN.  She lives alone and does not have med alert button, etc.  Denies recent falls. Ambulates with cane.    Review of patient's allergies indicates:   Allergen Reactions    Celebrex [celecoxib] Anaphylaxis    Gabapentin     Sulfamethoxazole        Review of Systems  Constitutional: No fatigue, weight loss.  Respiratory: No shortness of breath, No exertional dyspnea.   Cardiovascular: No chest pain, No palpitations, No peripheral edema.  Gastrointestinal: No nausea, No vomiting, No diarrhea, No rectal bleeding, No constipation, No abdominal pain.  Genitourinary: No dysuria, No hematuria, frequency.  Neurologic: No altered mental status, No headaches.  Psychiatrics: No anxiety, ? depression, No SI/HI.  Objective:   Visit Vitals  /80 (BP Location: Right arm, Patient Position: Sitting)   Pulse 98   Ht 5' (1.524 m)   Wt 48.1 kg (106 lb)   SpO2 99%   BMI 20.70 kg/m²     The patient's weight trend is below:   Wt Readings from Last 4 Encounters:   04/14/25 48.1 kg (106 lb)   03/13/25 48.5 kg (107 lb)   11/21/24 53.1 kg (117 lb)   10/22/24 53.1 kg (117 lb)        Physical Exam  Vitals and nursing note reviewed.   Constitutional:       General: She is not in acute distress.     Appearance: Normal appearance. She is normal weight. She is not ill-appearing, toxic-appearing or  diaphoretic.   HENT:      Head: Normocephalic and atraumatic.   Cardiovascular:      Rate and Rhythm: Normal rate and regular rhythm.      Heart sounds: Normal heart sounds.   Pulmonary:      Effort: Pulmonary effort is normal.   Abdominal:      General: Abdomen is flat. Bowel sounds are normal.      Palpations: Abdomen is soft.   Skin:     General: Skin is warm and dry.   Neurological:      General: No focal deficit present.      Mental Status: She is alert and oriented to person, place, and time. Mental status is at baseline.   Psychiatric:         Mood and Affect: Mood normal.         Behavior: Behavior normal.         Thought Content: Thought content normal.         Judgment: Judgment normal.         Assessment/Plan:   1. Unstable gait  Assessment & Plan:  Will refer to  for PT/OT/SN--son will follow up with facility of choice      2. Neuropathy  Assessment & Plan:  Continue current RX dosage of gabapentin         3. At high risk for falls  Assessment & Plan:  Will refer to  for PT/OT/SN--son will follow up with facility of choice      4. Mild episode of recurrent major depressive disorder  Assessment & Plan:  Encouraged compliant dosing of Lexapro 5mg daily  Follow up as needed         5. Weight loss  Assessment & Plan:  Small frequent meals, protein dense   Continue to monitor weight      6. OAB (overactive bladder)  Assessment & Plan:  Per Urology  Will reach out to specialist regarding medication being taken           Medication List with Changes/Refills   Current Medications    AMLODIPINE (NORVASC) 5 MG TABLET    Take 1 tablet (5 mg total) by mouth 2 (two) times a day.    APIXABAN (ELIQUIS) 2.5 MG TAB    Take 1 tablet (2.5 mg total) by mouth 2 (two) times daily.    CLOBETASOL (TEMOVATE) 0.05 % CREAM    Apply sparingly to lower legs twice a day for two weeks then every other day for two weeks then twice weekly for one month if needed as directed. Avoid face and genitalia.    ESCITALOPRAM OXALATE  (LEXAPRO) 5 MG TAB    Take 1 tablet (5 mg total) by mouth once daily.    FUROSEMIDE (LASIX) 20 MG TABLET    Take 1 tablet (20 mg total) by mouth once daily.    GABAPENTIN (NEURONTIN) 300 MG CAPSULE    Take 1 capsule (300 mg total) by mouth 2 (two) times daily.    HYDROXYCHLOROQUINE (PLAQUENIL) 200 MG TABLET    Take 1 tablet (200 mg total) by mouth once daily.    LUMIGAN 0.01 % DROP    Place into both eyes.    METOPROLOL SUCCINATE (TOPROL-XL) 50 MG 24 HR TABLET    Take 1 tablet (50 mg total) by mouth once daily.    MIRABEGRON (MYRBETRIQ) 50 MG TB24    Take 50 mg by mouth once daily.    MUPIROCIN CALCIUM 2% (BACTROBAN) 2 % CREAM    Apply 1 g topically 3 (three) times daily.    TACROLIMUS (PROTOPIC) 0.1 % OINTMENT    SMARTSIG:sparingly Topical Twice Daily    TRAMADOL (ULTRAM) 50 MG TABLET    Take 1 tablet (50 mg total) by mouth every 6 (six) hours as needed for Pain.        Follow up if symptoms worsen or fail to improve.    Chemistry:  Lab Results   Component Value Date     03/17/2025    K 4.4 03/17/2025    BUN 22.8 (H) 03/17/2025    CREATININE 1.09 (H) 03/17/2025    EGFRNORACEVR 49 03/17/2025    GLUCOSE 103 03/17/2025    CALCIUM 9.5 03/17/2025    ALKPHOS 87 03/17/2025    LABPROT 7.3 03/17/2025    ALBUMIN 3.7 03/17/2025    BILIDIR 0.2 04/05/2022    IBILI 0.20 04/05/2022    AST 23 03/17/2025    ALT 10 03/17/2025        Lab Results   Component Value Date    HGBA1C 5.1 08/16/2024        Hematology:  Lab Results   Component Value Date    WBC 5.17 03/17/2025    HGB 12.6 03/17/2025    HCT 36.3 (L) 03/17/2025     03/17/2025       Lipid Panel:  Lab Results   Component Value Date    CHOL 221 (H) 08/16/2024    HDL 58 08/16/2024    .00 (H) 08/16/2024    TRIG 110 08/16/2024    TOTALCHOLEST 4 08/16/2024        Urine:  Lab Results   Component Value Date    APPEARANCEUA Clear 06/10/2024    SGUA 1.014 06/10/2024    PROTEINUA Negative 06/10/2024    KETONESUA Negative 06/10/2024    LEUKOCYTESUR Negative 06/10/2024     RBCUA 0-5 06/10/2024    WBCUA 0-5 06/10/2024    BACTERIA None Seen 06/10/2024    SQEPUA Trace 06/10/2024    HYALINECASTS 0-2 (A) 06/10/2024        Future Appointments   Date Time Provider Department Center   9/10/2025 10:20 AM Jacoby Topete MD [facility]

## 2025-04-16 ENCOUNTER — TELEPHONE (OUTPATIENT)
Dept: INTERNAL MEDICINE | Facility: CLINIC | Age: 89
End: 2025-04-16
Payer: MEDICARE

## 2025-04-16 NOTE — TELEPHONE ENCOUNTER
----- Message from Alina sent at 4/16/2025 10:26 AM CDT -----  Who Called: [facility] is requesting assistance/information from provider's office.Symptoms (please be specific): n/a How long has patient had these symptoms:  n/aList of preferred pharmacies on file (remove unneeded): n/aPreferred Method of Contact: Phone CallPatient's Preferred Phone Number on File: [phone] Best Call Back Number, if different:[phone] Information: caller stated that they are not in network with pts insurance. Please advise.

## 2025-04-16 NOTE — TELEPHONE ENCOUNTER
Additional Information: caller stated that they are not in network with pts insurance. Please advise.     **Sent to [employer] [facility]

## 2025-04-16 NOTE — TELEPHONE ENCOUNTER
----- Message from Odalis sent at 4/16/2025  1:48 PM CDT -----  .Who Called: Rosa with [facility]Patient is returning phone callWho Left Message for Patient:Andrew the patient know what this is regarding?:She stated they are Out of network with [employer] medicare. Here are suggestions of who are in network with patient ins:Sammy FayesPreferred Method of Contact: Phone CallPatient's Preferred Phone Number on File: [phone] Best Call Back Number, if different: [phone] Information: Out of network with [employer] medicare. Here are suggestions of who are in network with patient ins:Sammy Victor

## 2025-04-22 DIAGNOSIS — M06.9 RHEUMATOID ARTHRITIS INVOLVING MULTIPLE SITES, UNSPECIFIED WHETHER RHEUMATOID FACTOR PRESENT: ICD-10-CM

## 2025-04-22 RX ORDER — HYDROXYCHLOROQUINE SULFATE 200 MG/1
200 TABLET, FILM COATED ORAL
Qty: 90 TABLET | Refills: 3 | Status: SHIPPED | OUTPATIENT
Start: 2025-04-22

## 2025-05-19 ENCOUNTER — TELEPHONE (OUTPATIENT)
Dept: INTERNAL MEDICINE | Facility: CLINIC | Age: 89
End: 2025-05-19
Payer: MEDICARE

## 2025-05-19 NOTE — TELEPHONE ENCOUNTER
Copied from CRM #[id number]. Topic: General Inquiry - Patient Advice  >> May 19, 2025  1:39 PM Lianne wrote:  Who Called: luis a in regards to Danae Kaplan Abby    Caller is requesting assistance/information from provider's office.    Symptoms (please be specific): NA   How long has patient had these symptoms:  NA  List of preferred pharmacies on file (remove unneeded): [unfilled]  If different, enter pharmacy into here including location and phone number: NA      Preferred Method of Contact: Phone Call  Patient's Preferred Phone Number on File: [phone]   Best Call Back Number, if different:luis a [phone]  Additional Information: medical advice, luis a GAO with [facility] [phone] or fax [phone] called to note  in ot chart, that pt was advised on physical therapy, stated  pt is not ready at this time and if decides later to have provider assist with orders, please be advised thanks

## 2025-05-19 NOTE — TELEPHONE ENCOUNTER
Physical Therapy Notification:    Additional Information: medical advice, luis a GAO with [facility] [phone] or fax [phone] called to note  in ot chart, that pt was advised on physical therapy, stated  pt is not ready at this time and if decides later to have provider assist with orders, please be advised thanks

## 2025-05-21 ENCOUNTER — PATIENT MESSAGE (OUTPATIENT)
Dept: INTERNAL MEDICINE | Facility: CLINIC | Age: 89
End: 2025-05-21
Payer: MEDICARE

## 2025-05-21 DIAGNOSIS — Z91.81 AT HIGH RISK FOR FALLS: Primary | ICD-10-CM

## 2025-05-21 DIAGNOSIS — G62.9 NEUROPATHY: ICD-10-CM

## 2025-05-21 DIAGNOSIS — R26.81 UNSTABLE GAIT: ICD-10-CM

## 2025-06-05 ENCOUNTER — EXTERNAL HOME HEALTH (OUTPATIENT)
Dept: HOME HEALTH SERVICES | Facility: HOSPITAL | Age: 89
End: 2025-06-05
Payer: MEDICARE

## 2025-06-05 ENCOUNTER — DOCUMENT SCAN (OUTPATIENT)
Dept: HOME HEALTH SERVICES | Facility: HOSPITAL | Age: 89
End: 2025-06-05
Payer: MEDICARE

## 2025-06-09 ENCOUNTER — PATIENT MESSAGE (OUTPATIENT)
Dept: INTERNAL MEDICINE | Facility: CLINIC | Age: 89
End: 2025-06-09
Payer: MEDICARE

## 2025-06-10 ENCOUNTER — TELEPHONE (OUTPATIENT)
Dept: INTERNAL MEDICINE | Facility: CLINIC | Age: 89
End: 2025-06-10
Payer: MEDICARE

## 2025-06-10 NOTE — TELEPHONE ENCOUNTER
What about the Diagnosis? What is the issue?    As indicated on the Referral:  Diagnosis   G62.9 (ICD-10-CM) - Neuropathy   Z91.81 (ICD-10-CM) - At high risk for falls   F33.0 (ICD-10-CM) - Mild episode of recurrent major depressive disorder

## 2025-06-10 NOTE — TELEPHONE ENCOUNTER
Spoke to Papito Jackie to clear up any concerns with Diagnosis with Referral.  2025 Chart Notes also routed for reference

## 2025-06-10 NOTE — TELEPHONE ENCOUNTER
Copied from CRM #[id number]. Topic: General Inquiry - Patient Advice  >> Guevara 10, 2025 10:10 AM Gregory wrote:  .Who Called: Jackie with [facility]    Caller is requesting assistance/information from provider's office.    Symptoms (please be specific): N/A   How long has patient had these symptoms: N/A  List of preferred pharmacies on file (remove unneeded): [unfilled]  If different, enter pharmacy into here including location and phone number: N/A    Preferred Method of Contact: Phone Call    Patient's Preferred Phone Number on File: [phone]     Best Call Back Number, if different: [phone] (Jackie)    Additional Information: Called stating she's needing diagnosis clarification on pt's home health order. Please advise, thank you.

## 2025-06-11 ENCOUNTER — TELEPHONE (OUTPATIENT)
Dept: INTERNAL MEDICINE | Facility: CLINIC | Age: 89
End: 2025-06-11
Payer: MEDICARE

## 2025-06-11 NOTE — TELEPHONE ENCOUNTER
Copied from CRM #[id number]. Topic: General Inquiry - Patient Advice  >> Jun 11, 2025  1:13 PM Nicolasa wrote:  Who Called: [facility]   Caller is requesting assistance/information from provider's office.    Symptoms (please be specific):     How long has patient had these symptoms:     List of preferred pharmacies on file (remove unneeded): [unfilled]  If different, enter pharmacy into here including location and phone number:        Preferred Method of Contact: Phone Call  Patient's Preferred Phone Number on File: [phone]   Best Call Back Number, if different:[phone]  Additional Information:   staff would like to speak with nurse about an ref and to send over last office notes fax:[phone]

## 2025-06-11 NOTE — TELEPHONE ENCOUNTER
Madelyn is with [facility]   Jackie is with [facility]     Contacted patient, she has advised me that she is wanting to go to with [facility]

## 2025-06-11 NOTE — TELEPHONE ENCOUNTER
Additional Information:   staff would like to speak with nurse about an ref and to send over last office notes fax:[phone]     **DUPLICATE MESSAGE, This was taken care of Yesterday    From: Marybel Phillip  To: Crystal Bonner  Sent: 5/8/2021 11:38 AM CDT  Subject: Non-Urgent Medical Question    Hello DR. Bonner  I went and received my first Covid shot on May 7th and I had an allergic reaction from it. It got red and swollen. They applied ice and went to the urgent care at [facility]. The told me to ice it and take benadryl, I woke up the next morning redness was gone a little swollen but had a bruise around where the shot was administered. Just want you to know.  THANK You  Marybel Phillip

## 2025-06-25 DIAGNOSIS — M79.2 NERVE PAIN: ICD-10-CM

## 2025-06-25 DIAGNOSIS — I10 PRIMARY HYPERTENSION: ICD-10-CM

## 2025-06-25 RX ORDER — AMLODIPINE BESYLATE 5 MG/1
5 TABLET ORAL 2 TIMES DAILY
Qty: 180 TABLET | Refills: 3 | Status: SHIPPED | OUTPATIENT
Start: 2025-06-25

## 2025-06-25 RX ORDER — GABAPENTIN 300 MG/1
300 CAPSULE ORAL NIGHTLY
Qty: 90 CAPSULE | Refills: 3 | Status: SHIPPED | OUTPATIENT
Start: 2025-06-25

## 2025-07-10 ENCOUNTER — TELEPHONE (OUTPATIENT)
Dept: INTERNAL MEDICINE | Facility: CLINIC | Age: 89
End: 2025-07-10
Payer: MEDICARE

## 2025-07-10 DIAGNOSIS — N32.81 OVERACTIVE BLADDER: Primary | ICD-10-CM

## 2025-07-10 RX ORDER — VIBEGRON 75 MG/1
75 TABLET, FILM COATED ORAL NIGHTLY
COMMUNITY
End: 2025-07-10 | Stop reason: SDUPTHER

## 2025-07-10 RX ORDER — VIBEGRON 75 MG/1
75 TABLET, FILM COATED ORAL NIGHTLY
Qty: 90 TABLET | Refills: 2 | Status: SHIPPED | OUTPATIENT
Start: 2025-07-10

## 2025-07-10 NOTE — TELEPHONE ENCOUNTER
Copied from CRM #[id number]. Topic: Medications - New Medication Request  >> Jul 10, 2025 10:07 AM Gregory wrote:  .Who Called: Danae Mora    Caller is requesting assistance/information from provider's office.    Symptoms (please be specific): N/A   How long has patient had these symptoms:  N/A    List of preferred pharmacies on file (remove unneeded): [facility]   Phone: [phone]  Fax: [phone]    Preferred Method of Contact: Phone Call    Patient's Preferred Phone Number on File: [phone]     Best Call Back Number, if different:    Additional Information: Pt called requesting a prescription for Gemtesa sent into pharmacy for her over active bladder. Pt stated she was was taking medication years back, but doesn't remember the milligrams. Pt is requesting a cb from Pierre. Please advise.

## 2025-07-10 NOTE — TELEPHONE ENCOUNTER
Additional Information: Pt called requesting a prescription for Gemtesa sent into pharmacy for her over active bladder. Pt stated she was was taking medication years back, but doesn't remember the milligrams. Pt is requesting a cb from Pierre. Please advise.     **Okay to prescribe?

## 2025-07-11 ENCOUNTER — TELEPHONE (OUTPATIENT)
Dept: INTERNAL MEDICINE | Facility: CLINIC | Age: 89
End: 2025-07-11
Payer: MEDICARE

## 2025-07-11 NOTE — TELEPHONE ENCOUNTER
Copied from CRM #[id number]. Topic: General Inquiry - Patient Advice  >> Jul 11, 2025 10:09 AM Odalis wrote:  .Who Called: Danae Kaplan Abby    Patient is returning phone call    Who Left Message for Patient:Pierre  Does the patient know what this is regarding?:FYI-PT calling to thank Pierre for her assistant on yesterday with love       Preferred Method of Contact: Phone Call  Patient's Preferred Phone Number on File: [phone]   Best Call Back Number, if different:  Additional Information:

## 2025-07-31 DIAGNOSIS — I10 PRIMARY HYPERTENSION: ICD-10-CM

## 2025-07-31 RX ORDER — METOPROLOL SUCCINATE 50 MG/1
50 TABLET, EXTENDED RELEASE ORAL DAILY
Qty: 90 TABLET | Refills: 3 | Status: SHIPPED | OUTPATIENT
Start: 2025-07-31

## 2025-08-14 ENCOUNTER — TELEPHONE (OUTPATIENT)
Dept: INTERNAL MEDICINE | Facility: CLINIC | Age: 89
End: 2025-08-14
Payer: MEDICARE

## 2025-08-18 ENCOUNTER — EXTERNAL HOME HEALTH (OUTPATIENT)
Dept: HOME HEALTH SERVICES | Facility: HOSPITAL | Age: 89
End: 2025-08-18
Payer: MEDICARE

## 2025-09-02 ENCOUNTER — TELEPHONE (OUTPATIENT)
Dept: INTERNAL MEDICINE | Facility: CLINIC | Age: 89
End: 2025-09-02
Payer: MEDICARE

## 2025-09-02 DIAGNOSIS — I10 PRIMARY HYPERTENSION: Primary | ICD-10-CM

## 2025-09-02 DIAGNOSIS — Z00.00 WELL ADULT EXAM: ICD-10-CM

## 2025-09-02 DIAGNOSIS — R73.01 ELEVATED FASTING GLUCOSE: ICD-10-CM

## 2025-09-02 DIAGNOSIS — R53.83 FATIGUE, UNSPECIFIED TYPE: ICD-10-CM
